# Patient Record
Sex: FEMALE | Race: NATIVE HAWAIIAN OR OTHER PACIFIC ISLANDER | HISPANIC OR LATINO | Employment: UNEMPLOYED | URBAN - METROPOLITAN AREA
[De-identification: names, ages, dates, MRNs, and addresses within clinical notes are randomized per-mention and may not be internally consistent; named-entity substitution may affect disease eponyms.]

---

## 2021-04-26 ENCOUNTER — APPOINTMENT (EMERGENCY)
Dept: CT IMAGING | Facility: HOSPITAL | Age: 46
DRG: 251 | End: 2021-04-26
Payer: COMMERCIAL

## 2021-04-26 ENCOUNTER — HOSPITAL ENCOUNTER (INPATIENT)
Facility: HOSPITAL | Age: 46
LOS: 1 days | Discharge: LEFT BEFORE DISCHARGE | DRG: 251 | End: 2021-04-27
Attending: EMERGENCY MEDICINE | Admitting: INTERNAL MEDICINE
Payer: COMMERCIAL

## 2021-04-26 DIAGNOSIS — R10.9 ABDOMINAL PAIN: Primary | ICD-10-CM

## 2021-04-26 DIAGNOSIS — R79.89 ELEVATED LFTS: ICD-10-CM

## 2021-04-26 LAB
ALBUMIN SERPL BCP-MCNC: 3.8 G/DL (ref 3.5–5)
ALP SERPL-CCNC: 236 U/L (ref 46–116)
ALT SERPL W P-5'-P-CCNC: 406 U/L (ref 12–78)
ANION GAP SERPL CALCULATED.3IONS-SCNC: 10 MMOL/L (ref 4–13)
AST SERPL W P-5'-P-CCNC: 551 U/L (ref 5–45)
BACTERIA UR QL AUTO: ABNORMAL /HPF
BASOPHILS # BLD AUTO: 0.03 THOUSANDS/ΜL (ref 0–0.1)
BASOPHILS NFR BLD AUTO: 0 % (ref 0–1)
BILIRUB DIRECT SERPL-MCNC: 0.63 MG/DL (ref 0–0.2)
BILIRUB SERPL-MCNC: 0.97 MG/DL (ref 0.2–1)
BILIRUB UR QL STRIP: ABNORMAL
BUN SERPL-MCNC: 13 MG/DL (ref 5–25)
CALCIUM SERPL-MCNC: 9.3 MG/DL (ref 8.3–10.1)
CHLORIDE SERPL-SCNC: 101 MMOL/L (ref 100–108)
CLARITY UR: CLEAR
CO2 SERPL-SCNC: 30 MMOL/L (ref 21–32)
COLOR UR: YELLOW
CREAT SERPL-MCNC: 0.99 MG/DL (ref 0.6–1.3)
EOSINOPHIL # BLD AUTO: 0.17 THOUSAND/ΜL (ref 0–0.61)
EOSINOPHIL NFR BLD AUTO: 2 % (ref 0–6)
ERYTHROCYTE [DISTWIDTH] IN BLOOD BY AUTOMATED COUNT: 13 % (ref 11.6–15.1)
EXT PREG TEST URINE: NEGATIVE
EXT. CONTROL ED NAV: NORMAL
GFR SERPL CREATININE-BSD FRML MDRD: 69 ML/MIN/1.73SQ M
GLUCOSE SERPL-MCNC: 124 MG/DL (ref 65–140)
GLUCOSE UR STRIP-MCNC: NEGATIVE MG/DL
HCT VFR BLD AUTO: 43.2 % (ref 34.8–46.1)
HGB BLD-MCNC: 14.8 G/DL (ref 11.5–15.4)
HGB UR QL STRIP.AUTO: ABNORMAL
IMM GRANULOCYTES # BLD AUTO: 0.05 THOUSAND/UL (ref 0–0.2)
IMM GRANULOCYTES NFR BLD AUTO: 1 % (ref 0–2)
KETONES UR STRIP-MCNC: NEGATIVE MG/DL
LEUKOCYTE ESTERASE UR QL STRIP: NEGATIVE
LIPASE SERPL-CCNC: 120 U/L (ref 73–393)
LYMPHOCYTES # BLD AUTO: 2.64 THOUSANDS/ΜL (ref 0.6–4.47)
LYMPHOCYTES NFR BLD AUTO: 25 % (ref 14–44)
MCH RBC QN AUTO: 33.6 PG (ref 26.8–34.3)
MCHC RBC AUTO-ENTMCNC: 34.3 G/DL (ref 31.4–37.4)
MCV RBC AUTO: 98 FL (ref 82–98)
MONOCYTES # BLD AUTO: 0.82 THOUSAND/ΜL (ref 0.17–1.22)
MONOCYTES NFR BLD AUTO: 8 % (ref 4–12)
NEUTROPHILS # BLD AUTO: 6.68 THOUSANDS/ΜL (ref 1.85–7.62)
NEUTS SEG NFR BLD AUTO: 64 % (ref 43–75)
NITRITE UR QL STRIP: NEGATIVE
NON-SQ EPI CELLS URNS QL MICRO: ABNORMAL /HPF
NRBC BLD AUTO-RTO: 0 /100 WBCS
PH UR STRIP.AUTO: 6.5 [PH] (ref 4.5–8)
PLATELET # BLD AUTO: 358 THOUSANDS/UL (ref 149–390)
PMV BLD AUTO: 9.7 FL (ref 8.9–12.7)
POTASSIUM SERPL-SCNC: 3.7 MMOL/L (ref 3.5–5.3)
PROT SERPL-MCNC: 8.1 G/DL (ref 6.4–8.2)
PROT UR STRIP-MCNC: ABNORMAL MG/DL
RBC # BLD AUTO: 4.41 MILLION/UL (ref 3.81–5.12)
RBC #/AREA URNS AUTO: ABNORMAL /HPF
SODIUM SERPL-SCNC: 141 MMOL/L (ref 136–145)
SP GR UR STRIP.AUTO: >=1.03 (ref 1–1.03)
UROBILINOGEN UR QL STRIP.AUTO: 2 E.U./DL
WBC # BLD AUTO: 10.39 THOUSAND/UL (ref 4.31–10.16)
WBC #/AREA URNS AUTO: ABNORMAL /HPF

## 2021-04-26 PROCEDURE — 96375 TX/PRO/DX INJ NEW DRUG ADDON: CPT

## 2021-04-26 PROCEDURE — 80076 HEPATIC FUNCTION PANEL: CPT | Performed by: EMERGENCY MEDICINE

## 2021-04-26 PROCEDURE — 36415 COLL VENOUS BLD VENIPUNCTURE: CPT | Performed by: EMERGENCY MEDICINE

## 2021-04-26 PROCEDURE — 80048 BASIC METABOLIC PNL TOTAL CA: CPT | Performed by: EMERGENCY MEDICINE

## 2021-04-26 PROCEDURE — 85025 COMPLETE CBC W/AUTO DIFF WBC: CPT | Performed by: EMERGENCY MEDICINE

## 2021-04-26 PROCEDURE — 99284 EMERGENCY DEPT VISIT MOD MDM: CPT

## 2021-04-26 PROCEDURE — 74177 CT ABD & PELVIS W/CONTRAST: CPT

## 2021-04-26 PROCEDURE — 96366 THER/PROPH/DIAG IV INF ADDON: CPT

## 2021-04-26 PROCEDURE — 99285 EMERGENCY DEPT VISIT HI MDM: CPT | Performed by: EMERGENCY MEDICINE

## 2021-04-26 PROCEDURE — 83690 ASSAY OF LIPASE: CPT | Performed by: EMERGENCY MEDICINE

## 2021-04-26 PROCEDURE — 81025 URINE PREGNANCY TEST: CPT | Performed by: EMERGENCY MEDICINE

## 2021-04-26 PROCEDURE — 96365 THER/PROPH/DIAG IV INF INIT: CPT

## 2021-04-26 PROCEDURE — 81001 URINALYSIS AUTO W/SCOPE: CPT

## 2021-04-26 RX ORDER — KETOROLAC TROMETHAMINE 30 MG/ML
15 INJECTION, SOLUTION INTRAMUSCULAR; INTRAVENOUS ONCE
Status: COMPLETED | OUTPATIENT
Start: 2021-04-26 | End: 2021-04-26

## 2021-04-26 RX ORDER — ONDANSETRON 2 MG/ML
4 INJECTION INTRAMUSCULAR; INTRAVENOUS ONCE
Status: COMPLETED | OUTPATIENT
Start: 2021-04-26 | End: 2021-04-26

## 2021-04-26 RX ORDER — MORPHINE SULFATE 4 MG/ML
4 INJECTION, SOLUTION INTRAMUSCULAR; INTRAVENOUS ONCE
Status: COMPLETED | OUTPATIENT
Start: 2021-04-26 | End: 2021-04-26

## 2021-04-26 RX ADMIN — KETOROLAC TROMETHAMINE 15 MG: 30 INJECTION, SOLUTION INTRAMUSCULAR; INTRAVENOUS at 20:06

## 2021-04-26 RX ADMIN — MORPHINE SULFATE 4 MG: 4 INJECTION INTRAVENOUS at 20:04

## 2021-04-26 RX ADMIN — SODIUM CHLORIDE, SODIUM LACTATE, POTASSIUM CHLORIDE, AND CALCIUM CHLORIDE 1000 ML: .6; .31; .03; .02 INJECTION, SOLUTION INTRAVENOUS at 20:10

## 2021-04-26 RX ADMIN — IOHEXOL 100 ML: 350 INJECTION, SOLUTION INTRAVENOUS at 20:59

## 2021-04-26 RX ADMIN — ONDANSETRON 4 MG: 2 INJECTION INTRAMUSCULAR; INTRAVENOUS at 20:03

## 2021-04-27 ENCOUNTER — HOSPITAL ENCOUNTER (INPATIENT)
Facility: HOSPITAL | Age: 46
LOS: 1 days | DRG: 284 | End: 2021-04-28
Attending: EMERGENCY MEDICINE | Admitting: INTERNAL MEDICINE
Payer: COMMERCIAL

## 2021-04-27 VITALS
WEIGHT: 146.16 LBS | BODY MASS INDEX: 26.9 KG/M2 | TEMPERATURE: 98.2 F | SYSTOLIC BLOOD PRESSURE: 128 MMHG | DIASTOLIC BLOOD PRESSURE: 79 MMHG | OXYGEN SATURATION: 100 % | HEIGHT: 62 IN | RESPIRATION RATE: 17 BRPM | HEART RATE: 72 BPM

## 2021-04-27 DIAGNOSIS — R10.13 EPIGASTRIC ABDOMINAL PAIN: Primary | ICD-10-CM

## 2021-04-27 DIAGNOSIS — R11.2 NAUSEA AND VOMITING, INTRACTABILITY OF VOMITING NOT SPECIFIED, UNSPECIFIED VOMITING TYPE: ICD-10-CM

## 2021-04-27 DIAGNOSIS — E80.6 HYPERBILIRUBINEMIA: ICD-10-CM

## 2021-04-27 DIAGNOSIS — R79.89 ELEVATED LFTS: ICD-10-CM

## 2021-04-27 DIAGNOSIS — R10.9 ABDOMINAL PAIN, UNSPECIFIED ABDOMINAL LOCATION: ICD-10-CM

## 2021-04-27 LAB
ALBUMIN SERPL BCP-MCNC: 2.9 G/DL (ref 3.5–5)
ALBUMIN SERPL BCP-MCNC: 3.7 G/DL (ref 3.5–5)
ALP SERPL-CCNC: 197 U/L (ref 46–116)
ALP SERPL-CCNC: 277 U/L (ref 46–116)
ALT SERPL W P-5'-P-CCNC: 385 U/L (ref 12–78)
ALT SERPL W P-5'-P-CCNC: 705 U/L (ref 12–78)
ANION GAP SERPL CALCULATED.3IONS-SCNC: 11 MMOL/L (ref 4–13)
ANION GAP SERPL CALCULATED.3IONS-SCNC: 9 MMOL/L (ref 4–13)
AST SERPL W P-5'-P-CCNC: 1068 U/L (ref 5–45)
AST SERPL W P-5'-P-CCNC: 266 U/L (ref 5–45)
BASOPHILS # BLD AUTO: 0.02 THOUSANDS/ΜL (ref 0–0.1)
BASOPHILS # BLD AUTO: 0.02 THOUSANDS/ΜL (ref 0–0.1)
BASOPHILS NFR BLD AUTO: 0 % (ref 0–1)
BASOPHILS NFR BLD AUTO: 0 % (ref 0–1)
BILIRUB DIRECT SERPL-MCNC: 1.4 MG/DL (ref 0–0.2)
BILIRUB SERPL-MCNC: 0.39 MG/DL (ref 0.2–1)
BILIRUB SERPL-MCNC: 1.79 MG/DL (ref 0.2–1)
BUN SERPL-MCNC: 12 MG/DL (ref 5–25)
BUN SERPL-MCNC: 13 MG/DL (ref 5–25)
CALCIUM ALBUM COR SERPL-MCNC: 9.5 MG/DL (ref 8.3–10.1)
CALCIUM SERPL-MCNC: 8.6 MG/DL (ref 8.3–10.1)
CALCIUM SERPL-MCNC: 9.1 MG/DL (ref 8.3–10.1)
CHLORIDE SERPL-SCNC: 102 MMOL/L (ref 100–108)
CHLORIDE SERPL-SCNC: 107 MMOL/L (ref 100–108)
CO2 SERPL-SCNC: 26 MMOL/L (ref 21–32)
CO2 SERPL-SCNC: 27 MMOL/L (ref 21–32)
CREAT SERPL-MCNC: 0.87 MG/DL (ref 0.6–1.3)
CREAT SERPL-MCNC: 0.96 MG/DL (ref 0.6–1.3)
EOSINOPHIL # BLD AUTO: 0.1 THOUSAND/ΜL (ref 0–0.61)
EOSINOPHIL # BLD AUTO: 0.28 THOUSAND/ΜL (ref 0–0.61)
EOSINOPHIL NFR BLD AUTO: 1 % (ref 0–6)
EOSINOPHIL NFR BLD AUTO: 4 % (ref 0–6)
ERYTHROCYTE [DISTWIDTH] IN BLOOD BY AUTOMATED COUNT: 12.9 % (ref 11.6–15.1)
ERYTHROCYTE [DISTWIDTH] IN BLOOD BY AUTOMATED COUNT: 13.1 % (ref 11.6–15.1)
EXT PREG TEST URINE: NEGATIVE
EXT. CONTROL ED NAV: NORMAL
GFR SERPL CREATININE-BSD FRML MDRD: 72 ML/MIN/1.73SQ M
GFR SERPL CREATININE-BSD FRML MDRD: 81 ML/MIN/1.73SQ M
GLUCOSE SERPL-MCNC: 111 MG/DL (ref 65–140)
GLUCOSE SERPL-MCNC: 95 MG/DL (ref 65–140)
HCT VFR BLD AUTO: 39 % (ref 34.8–46.1)
HCT VFR BLD AUTO: 41.6 % (ref 34.8–46.1)
HGB BLD-MCNC: 13 G/DL (ref 11.5–15.4)
HGB BLD-MCNC: 14.1 G/DL (ref 11.5–15.4)
IMM GRANULOCYTES # BLD AUTO: 0.02 THOUSAND/UL (ref 0–0.2)
IMM GRANULOCYTES # BLD AUTO: 0.03 THOUSAND/UL (ref 0–0.2)
IMM GRANULOCYTES NFR BLD AUTO: 0 % (ref 0–2)
IMM GRANULOCYTES NFR BLD AUTO: 0 % (ref 0–2)
LIPASE SERPL-CCNC: 83 U/L (ref 73–393)
LYMPHOCYTES # BLD AUTO: 1.83 THOUSANDS/ΜL (ref 0.6–4.47)
LYMPHOCYTES # BLD AUTO: 2.78 THOUSANDS/ΜL (ref 0.6–4.47)
LYMPHOCYTES NFR BLD AUTO: 25 % (ref 14–44)
LYMPHOCYTES NFR BLD AUTO: 38 % (ref 14–44)
MCH RBC QN AUTO: 33.2 PG (ref 26.8–34.3)
MCH RBC QN AUTO: 33.7 PG (ref 26.8–34.3)
MCHC RBC AUTO-ENTMCNC: 33.3 G/DL (ref 31.4–37.4)
MCHC RBC AUTO-ENTMCNC: 33.9 G/DL (ref 31.4–37.4)
MCV RBC AUTO: 101 FL (ref 82–98)
MCV RBC AUTO: 98 FL (ref 82–98)
MONOCYTES # BLD AUTO: 0.57 THOUSAND/ΜL (ref 0.17–1.22)
MONOCYTES # BLD AUTO: 0.62 THOUSAND/ΜL (ref 0.17–1.22)
MONOCYTES NFR BLD AUTO: 8 % (ref 4–12)
MONOCYTES NFR BLD AUTO: 8 % (ref 4–12)
NEUTROPHILS # BLD AUTO: 3.65 THOUSANDS/ΜL (ref 1.85–7.62)
NEUTROPHILS # BLD AUTO: 4.93 THOUSANDS/ΜL (ref 1.85–7.62)
NEUTS SEG NFR BLD AUTO: 50 % (ref 43–75)
NEUTS SEG NFR BLD AUTO: 66 % (ref 43–75)
NRBC BLD AUTO-RTO: 0 /100 WBCS
NRBC BLD AUTO-RTO: 0 /100 WBCS
PLATELET # BLD AUTO: 292 THOUSANDS/UL (ref 149–390)
PLATELET # BLD AUTO: 319 THOUSANDS/UL (ref 149–390)
PMV BLD AUTO: 9.8 FL (ref 8.9–12.7)
PMV BLD AUTO: 9.8 FL (ref 8.9–12.7)
POTASSIUM SERPL-SCNC: 3.5 MMOL/L (ref 3.5–5.3)
POTASSIUM SERPL-SCNC: 4.2 MMOL/L (ref 3.5–5.3)
PROT SERPL-MCNC: 6.8 G/DL (ref 6.4–8.2)
PROT SERPL-MCNC: 7.9 G/DL (ref 6.4–8.2)
RBC # BLD AUTO: 3.86 MILLION/UL (ref 3.81–5.12)
RBC # BLD AUTO: 4.25 MILLION/UL (ref 3.81–5.12)
SODIUM SERPL-SCNC: 140 MMOL/L (ref 136–145)
SODIUM SERPL-SCNC: 142 MMOL/L (ref 136–145)
WBC # BLD AUTO: 7.37 THOUSAND/UL (ref 4.31–10.16)
WBC # BLD AUTO: 7.48 THOUSAND/UL (ref 4.31–10.16)

## 2021-04-27 PROCEDURE — 81025 URINE PREGNANCY TEST: CPT | Performed by: PHYSICIAN ASSISTANT

## 2021-04-27 PROCEDURE — 85025 COMPLETE CBC W/AUTO DIFF WBC: CPT | Performed by: PHYSICIAN ASSISTANT

## 2021-04-27 PROCEDURE — 96361 HYDRATE IV INFUSION ADD-ON: CPT

## 2021-04-27 PROCEDURE — C9113 INJ PANTOPRAZOLE SODIUM, VIA: HCPCS | Performed by: PHYSICIAN ASSISTANT

## 2021-04-27 PROCEDURE — 99222 1ST HOSP IP/OBS MODERATE 55: CPT | Performed by: INTERNAL MEDICINE

## 2021-04-27 PROCEDURE — 83690 ASSAY OF LIPASE: CPT | Performed by: PHYSICIAN ASSISTANT

## 2021-04-27 PROCEDURE — 99285 EMERGENCY DEPT VISIT HI MDM: CPT | Performed by: PHYSICIAN ASSISTANT

## 2021-04-27 PROCEDURE — 99254 IP/OBS CNSLTJ NEW/EST MOD 60: CPT | Performed by: INTERNAL MEDICINE

## 2021-04-27 PROCEDURE — 36415 COLL VENOUS BLD VENIPUNCTURE: CPT | Performed by: PHYSICIAN ASSISTANT

## 2021-04-27 PROCEDURE — 80053 COMPREHEN METABOLIC PANEL: CPT | Performed by: PHYSICIAN ASSISTANT

## 2021-04-27 PROCEDURE — 80076 HEPATIC FUNCTION PANEL: CPT | Performed by: PHYSICIAN ASSISTANT

## 2021-04-27 PROCEDURE — 80048 BASIC METABOLIC PNL TOTAL CA: CPT | Performed by: PHYSICIAN ASSISTANT

## 2021-04-27 PROCEDURE — 96375 TX/PRO/DX INJ NEW DRUG ADDON: CPT

## 2021-04-27 PROCEDURE — 99222 1ST HOSP IP/OBS MODERATE 55: CPT | Performed by: PHYSICIAN ASSISTANT

## 2021-04-27 PROCEDURE — 96374 THER/PROPH/DIAG INJ IV PUSH: CPT

## 2021-04-27 PROCEDURE — 99285 EMERGENCY DEPT VISIT HI MDM: CPT

## 2021-04-27 PROCEDURE — 99232 SBSQ HOSP IP/OBS MODERATE 35: CPT | Performed by: INTERNAL MEDICINE

## 2021-04-27 RX ORDER — ACETAMINOPHEN 325 MG/1
650 TABLET ORAL EVERY 6 HOURS PRN
Status: DISCONTINUED | OUTPATIENT
Start: 2021-04-27 | End: 2021-04-27 | Stop reason: HOSPADM

## 2021-04-27 RX ORDER — KETOROLAC TROMETHAMINE 30 MG/ML
15 INJECTION, SOLUTION INTRAMUSCULAR; INTRAVENOUS EVERY 6 HOURS PRN
Status: DISCONTINUED | OUTPATIENT
Start: 2021-04-27 | End: 2021-04-27 | Stop reason: HOSPADM

## 2021-04-27 RX ORDER — SODIUM CHLORIDE 9 MG/ML
125 INJECTION, SOLUTION INTRAVENOUS CONTINUOUS
Status: DISCONTINUED | OUTPATIENT
Start: 2021-04-27 | End: 2021-04-27 | Stop reason: HOSPADM

## 2021-04-27 RX ORDER — MAGNESIUM HYDROXIDE/ALUMINUM HYDROXICE/SIMETHICONE 120; 1200; 1200 MG/30ML; MG/30ML; MG/30ML
30 SUSPENSION ORAL EVERY 6 HOURS PRN
Status: DISCONTINUED | OUTPATIENT
Start: 2021-04-27 | End: 2021-04-28 | Stop reason: HOSPADM

## 2021-04-27 RX ORDER — PANTOPRAZOLE SODIUM 40 MG/1
40 INJECTION, POWDER, FOR SOLUTION INTRAVENOUS EVERY 12 HOURS SCHEDULED
Status: DISCONTINUED | OUTPATIENT
Start: 2021-04-27 | End: 2021-04-28 | Stop reason: HOSPADM

## 2021-04-27 RX ORDER — MAGNESIUM HYDROXIDE/ALUMINUM HYDROXICE/SIMETHICONE 120; 1200; 1200 MG/30ML; MG/30ML; MG/30ML
30 SUSPENSION ORAL EVERY 6 HOURS PRN
Status: DISCONTINUED | OUTPATIENT
Start: 2021-04-27 | End: 2021-04-27 | Stop reason: HOSPADM

## 2021-04-27 RX ORDER — KETOROLAC TROMETHAMINE 30 MG/ML
15 INJECTION, SOLUTION INTRAMUSCULAR; INTRAVENOUS EVERY 6 HOURS PRN
Status: DISCONTINUED | OUTPATIENT
Start: 2021-04-27 | End: 2021-04-27

## 2021-04-27 RX ORDER — ONDANSETRON 2 MG/ML
4 INJECTION INTRAMUSCULAR; INTRAVENOUS EVERY 6 HOURS PRN
Status: DISCONTINUED | OUTPATIENT
Start: 2021-04-27 | End: 2021-04-27 | Stop reason: HOSPADM

## 2021-04-27 RX ORDER — PANTOPRAZOLE SODIUM 40 MG/1
40 INJECTION, POWDER, FOR SOLUTION INTRAVENOUS EVERY 12 HOURS SCHEDULED
Status: DISCONTINUED | OUTPATIENT
Start: 2021-04-27 | End: 2021-04-27 | Stop reason: HOSPADM

## 2021-04-27 RX ORDER — LORAZEPAM 2 MG/ML
0.5 INJECTION INTRAMUSCULAR ONCE AS NEEDED
Status: DISCONTINUED | OUTPATIENT
Start: 2021-04-27 | End: 2021-04-27 | Stop reason: HOSPADM

## 2021-04-27 RX ORDER — ACETAMINOPHEN 325 MG/1
650 TABLET ORAL EVERY 6 HOURS PRN
Status: DISCONTINUED | OUTPATIENT
Start: 2021-04-27 | End: 2021-04-28

## 2021-04-27 RX ORDER — ONDANSETRON 2 MG/ML
4 INJECTION INTRAMUSCULAR; INTRAVENOUS ONCE
Status: COMPLETED | OUTPATIENT
Start: 2021-04-27 | End: 2021-04-27

## 2021-04-27 RX ORDER — MORPHINE SULFATE 4 MG/ML
4 INJECTION, SOLUTION INTRAMUSCULAR; INTRAVENOUS ONCE
Status: COMPLETED | OUTPATIENT
Start: 2021-04-27 | End: 2021-04-27

## 2021-04-27 RX ORDER — ONDANSETRON 2 MG/ML
4 INJECTION INTRAMUSCULAR; INTRAVENOUS EVERY 6 HOURS PRN
Status: DISCONTINUED | OUTPATIENT
Start: 2021-04-27 | End: 2021-04-28 | Stop reason: HOSPADM

## 2021-04-27 RX ORDER — SODIUM CHLORIDE 9 MG/ML
75 INJECTION, SOLUTION INTRAVENOUS CONTINUOUS
Status: DISPENSED | OUTPATIENT
Start: 2021-04-27 | End: 2021-04-28

## 2021-04-27 RX ADMIN — KETOROLAC TROMETHAMINE 15 MG: 30 INJECTION, SOLUTION INTRAMUSCULAR; INTRAVENOUS at 01:35

## 2021-04-27 RX ADMIN — MORPHINE SULFATE 4 MG: 4 INJECTION INTRAVENOUS at 20:04

## 2021-04-27 RX ADMIN — PANTOPRAZOLE SODIUM 40 MG: 40 INJECTION, POWDER, FOR SOLUTION INTRAVENOUS at 21:49

## 2021-04-27 RX ADMIN — SODIUM CHLORIDE 1000 ML: 0.9 INJECTION, SOLUTION INTRAVENOUS at 20:08

## 2021-04-27 RX ADMIN — SODIUM CHLORIDE 125 ML/HR: 0.9 INJECTION, SOLUTION INTRAVENOUS at 02:51

## 2021-04-27 RX ADMIN — ONDANSETRON 4 MG: 2 INJECTION INTRAMUSCULAR; INTRAVENOUS at 20:05

## 2021-04-27 NOTE — ED NOTES
Patient just fell asleep and was unable to sleep last night; Sergey Koenig RN will hold medications until patient wakes up;       Karina Hernandez RN  04/27/21 6988

## 2021-04-27 NOTE — UTILIZATION REVIEW
Inpatient Admission Authorization Request   NOTIFICATION OF INPATIENT ADMISSION/INPATIENT AUTHORIZATION REQUEST   SERVICING FACILITY:   [address] [place] [address], [facility], [facility]  Tax ID: [id number]  NPI: [id number]  Place of Service: Inpatient [address] [facility]  [address]  Place of Service Code: 24     ATTENDING PROVIDER:  Attending Name and NPI#: [facility] [[id number]]  Address: [address] [place] [address], [facility], [facility]  Phone: [phone]     UTILIZATION REVIEW CONTACT:  Gerard Walker, Utilization   Network Utilization Review Department  Phone: [phone]  Fax: [phone]  Email: [email or web address]     PHYSICIAN ADVISORY SERVICES:  FOR HLEV-WT-BDZY REVIEW - MEDICAL NECESSITY DENIAL  Phone: [phone]  Fax: [phone]  Email: [email or web address]     TYPE OF REQUEST:  Inpatient Status     ADMISSION INFORMATION:  ADMISSION DATE/TIME: 4/26/21 2318  PATIENT DIAGNOSIS CODE/DESCRIPTION:  Abdominal pain in female [R10 9]  DISCHARGE DATE/TIME: 4/27/2021 11:56 AM  DISCHARGE DISPOSITION (IF DISCHARGED): Home/Self Care     IMPORTANT INFORMATION:  Please contact the Gerard Walker directly with any questions or concerns regarding this request  Department voicemails are confidential     Send requests for admission clinical reviews, concurrent reviews, approvals, and administrative denials due to lack of clinical to fax [phone]

## 2021-04-27 NOTE — DISCHARGE SUMMARY
Discharge summary:  Patient left against medical advice      Admission Date: 4/26/2021       Left against medical advice Date:  04/27/2021        Primary Diagnoses  Principal Problem:    Abdominal pain  Active Problems:    Elevated LFTs    Nausea & vomiting  Resolved Problems:    * No resolved hospital problems  [facility] Course by problem:  * Abdominal pain  Assessment & Plan  Presented with 2 days of epigastric abdominal pain radiating to the back with associated nausea, vomiting, poor p o  Intake  Patient notes the pain is worse after eating  Notes she has not tolerated any significant p o  Intake in the past 2 days  Patient was also noted to have concurrent transaminitis  Her pain is located primarily in the epigastric region however not the right upper quadrant  Will start proton pump inhibitor  Patient will be evaluated by the GI team   MRCP planned to evaluate her transaminitis  Will confer with GI she also requires EGD if her epigastric postprandial pain is not felt to be secondary to her transaminitis  Patient was seen by the GI team, and scheduled for MRCP, however patient left against medical advice prior to completing her workup     Nausea & vomiting  Assessment & Plan  Patient presents nausea, vomiting, and epigastric pain  Possibly secondary to peptic ulcer disease  Start Protonix 40 mg IV b i d  Analgesics  Liquid diet was ordered, patient left against medical advice     Elevated LFTs  Assessment & Plan        Results from last 7 days   Lab Units 04/27/21  0501 04/26/21 2010   AST U/L 266* 551*   ALT U/L 385* 406*   ALK PHOS U/L 197* 236*   TOTAL BILIRUBIN mg/dL 0 39 0 97      -Patient presented with a transaminitis with an AST of 551, that improved to 166  -ALT was 406 and improved to 385   She had associated elevated alkaline phosphatase but normal total bilirubin  -Patient underwent previous cholecystectomy  -In 2015 status post cholecystectomy she was noted to have 3 stones in the common bile duct on MRCP that required ERCP   -Appreciate GI evaluation recommendations  -MRCP pending, however patient left against medical advice        Service:  [facility], [facility]  Consulting Providers   GI: [facility], Kirti Steele [facility] tests Performed   4/26 CT abdomen/pelvis:  No acute abnormality  Rounded structure in the region of the common bile duct, which appears to have been present dating back to 4912, but of uncertain etiology, 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  Patrice Champ is some mass effect on the common bile duct and it is unclear   whether this is causing impedance of biliary flow   Recommend MRI/MRCP for complete evaluation  Results from last 7 days   Lab Units 04/27/21  0501 04/26/21 2010   WBC Thousand/uL 7 37 10 39*   HEMOGLOBIN g/dL 13 0 14 8   HEMATOCRIT % 39 0 43 2   PLATELETS Thousands/uL 292 358     Results from last 7 days   Lab Units 04/27/21  0501 04/26/21 2010   POTASSIUM mmol/L 4 2 3 7   CHLORIDE mmol/L 107 101   CO2 mmol/L 26 30   BUN mg/dL 13 13   CREATININE mg/dL 0 87 0 99   CALCIUM mg/dL 8 6 9 3       History and Physical Exam:  Please refer to the Admission H&P note    Discharge Condition:  Left against medical advice  Discharge Disposition:  Left against medical advice    Discharge Note and Physical Exam:   Please note from earlier today  Patient currently sitting she needs to leave against medical advice and signed herself out and she needs to be home with her daughters  She has just had an emotional interview with her significant other  Patient is currently awake, alert, and competent to make her own decisions    She denies any domestic abuse of without anyone is forcing her to leave the hospital     Discharge Medications   Please see Medical Reconciliation Discharge Form    Discharge Follow Up Appointments:   Patient instructed to return to the ER if she wishes to continue treatment    This note has been constructed using a voice recognition system

## 2021-04-27 NOTE — ED NOTES
Patient left AMA; patient does not want to wait for AMA paperwork or to speak with [facility] providers; Lauren Bright RN educated patient the importance to follow up care and that patient needs to come back to the ED if symptoms get worst; patient verbalized understanding;        Chong Madrid RN  04/27/21 6061

## 2021-04-27 NOTE — ASSESSMENT & PLAN NOTE
Results from last 7 days   Lab Units 04/26/21 2010   AST U/L 551*   ALT U/L 406*   ALK PHOS U/L 236*   TOTAL BILIRUBIN mg/dL 0 97         Alk phos 236  Likely from compression of CBD as evidenced by CT abdomen  MRCP tomorrow  Follow LFTs

## 2021-04-27 NOTE — ED NOTES
Patient ambulatory to the bathroom with steady gait at this time       Maria Sagastume RN  04/27/21 2133

## 2021-04-27 NOTE — ASSESSMENT & PLAN NOTE
Presents with 2 days of epigastric abdominal pain radiating to the back with associated nausea, vomiting, poor p o  Intake  Patient notes the pain is worse after eating  Notes she has not tolerated any significant p o  Intake in the past 2 days  Patient was also noted to have concurrent transaminitis    Her pain is located primarily in the epigastric region however not the right upper quadrant  Will start proton pump inhibitor  Patient will be evaluated by the GI team   MRCP planned to evaluate her transaminitis  Will confer with GI she also requires EGD if her epigastric postprandial pain is not felt to be secondary to her transaminitis

## 2021-04-27 NOTE — ASSESSMENT & PLAN NOTE
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

## 2021-04-27 NOTE — ASSESSMENT & PLAN NOTE
Patient presents nausea, vomiting, and epigastric pain  Possibly secondary to peptic ulcer disease  Start Protonix 40 mg IV b i d    Analgesics  Liquid diet

## 2021-04-27 NOTE — CONSULTS
Consultation - [facility]  Raul Brar 40 y o  female MRN: [id number]  Unit/Bed#: ED 21 Encounter: [id number]        Inpatient consult to gastroenterology  Consult performed by: Dion Becker PA-C  [facility]    Inpatient consult to gastroenterology  Consult performed by: Dion Becker PA-C  Consult ordered by: Hershel Osler, MD          Reason for Consult / Principal Problem:  Abdominal pain    HPI:  71-year-old female with history of cholecystectomy presenting for evaluation of epigastric pain  She reports onset of stabbing squeezing epigastric pain 2 days ago, but the pain became unbearable and constant yesterday  She recalls having similar pain before her cholecystectomy 5-6 years ago  She then had similar pain 2-3 months later and required what sounds like ERCP for biliary stones at [facility]  She could only keep water down yesterday  She was vomiting solid foods  She denies any blood in the vomit  She denies fevers and chills  She denies any diarrhea, constipation, rectal bleeding  She was alternating aspirin and Tylenol at home for the abdominal pain  REVIEW OF SYSTEMS:    CONSTITUTIONAL: Denies any fever, chills, or rigors  Good appetite, and no recent weight loss  HEENT: No earache or tinnitus  Denies hearing loss or visual disturbances  CARDIOVASCULAR: No chest pain or palpitations  RESPIRATORY: Denies any cough, hemoptysis, shortness of breath or dyspnea on exertion  GASTROINTESTINAL: As noted in the History of Present Illness  GENITOURINARY: No problems with urination  Denies any hematuria or dysuria  NEUROLOGIC: No dizziness or vertigo, denies headaches  MUSCULOSKELETAL: Denies any muscle or joint pain  SKIN: Denies skin rashes or itching  ENDOCRINE: Denies excessive thirst  Denies intolerance to heat or cold  PSYCHOSOCIAL: Denies depression or anxiety  Denies any recent memory loss         Historical Information   Past Medical History:   Diagnosis Date    Asthma     Gallstone      History reviewed  No pertinent surgical history  Social History   Social History     Substance and Sexual Activity   Alcohol Use Not Currently     Social History     Substance and Sexual Activity   Drug Use Not Currently     Social History     Tobacco Use   Smoking Status Current Every Day Smoker    Packs/day: 1 00   Smokeless Tobacco Current User     History reviewed  No pertinent family history  Meds/Allergies     (Not in a hospital admission)    Current Facility-Administered Medications   Medication Dose Route Frequency    acetaminophen (TYLENOL) tablet 650 mg  650 mg Oral Q6H PRN    aluminum-magnesium hydroxide-simethicone (MYLANTA) oral suspension 30 mL  30 mL Oral Q6H PRN    enoxaparin (LOVENOX) subcutaneous injection 40 mg  40 mg Subcutaneous Q24H HARPER    ketorolac (TORADOL) injection 15 mg  15 mg Intravenous Q6H PRN    LORazepam (ATIVAN) injection 0 5 mg  0 5 mg Intravenous Once PRN    morphine injection 2 mg  2 mg Intravenous Q4H PRN    ondansetron (ZOFRAN) injection 4 mg  4 mg Intravenous Q6H PRN    pantoprazole (PROTONIX) injection 40 mg  40 mg Intravenous Q12H [address]    sodium chloride 0 9 % infusion  125 mL/hr Intravenous Continuous       No Known Allergies        Objective     Blood pressure 128/79, pulse 72, temperature 98 2 °F (36 8 °C), temperature source Oral, resp  rate 17, height 5' 2" (1 575 m), weight 66 3 kg (146 lb 2 6 oz), SpO2 100 %        Intake/Output Summary (Last 24 hours) at 4/27/2021 1203  Last data filed at 4/27/2021 1157  Gross per 24 hour   Intake 2137 5 ml   Output --   Net 2137 5 ml         PHYSICAL EXAM:      General Appearance:   Alert, appears in moderate discomfort, cooperative, no distress, appears stated age    HEENT:   Normocephalic, atraumatic, anicteric      Neck:  Supple, symmetrical, trachea midline, no adenopathy   Lungs:   Clear to auscultation bilaterally   Heart[de-identified]   S1 and S2 normal; regular rate and rhythm   Abdomen:   Nondistended  Normal bowel sounds  Soft  Mild epigastric tenderness to palpation  No rebound or guarding  Genitalia:   Deferred    Rectal:   Deferred    Extremities:  No cyanosis, clubbing or edema    Pulses:  2+ and symmetric all extremities    Skin:  No jaundice    Lymph nodes:  No palpable cervical lymphadenopathy        Lab Results:   Results from last 7 days   Lab Units 04/27/21  0501   WBC Thousand/uL 7 37   HEMOGLOBIN g/dL 13 0   HEMATOCRIT % 39 0   PLATELETS Thousands/uL 292   NEUTROS PCT % 50   LYMPHS PCT % 38   MONOS PCT % 8   EOS PCT % 4     Results from last 7 days   Lab Units 04/27/21  0501   POTASSIUM mmol/L 4 2   CHLORIDE mmol/L 107   CO2 mmol/L 26   BUN mg/dL 13   CREATININE mg/dL 0 87   CALCIUM mg/dL 8 6   ALK PHOS U/L 197*   ALT U/L 385*   AST U/L 266*         Results from last 7 days   Lab Units 04/26/21 2010   LIPASE u/L 120       Imaging Studies: I have personally reviewed pertinent imaging studies  Ct Abdomen Pelvis With Contrast    Result Date: 4/26/2021  Impression: No acute abnormality  Rounded structure in the region of the common bile duct, which appears to have been present dating back to 4875, but of uncertain etiology, 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  There is some mass effect on the common bile duct and it is unclear whether this is causing impedance of biliary flow  Recommend MRI/MRCP for complete evaluation  The study was marked in [facility] for immediate notification  Workstation performed: [id number]       ASSESSMENT and PLAN:      43-year-old female with history of cholecystectomy presenting for evaluation of epigastric pain and elevated liver enzymes  1) Acute epigastric pain, nausea, and vomiting  2) Elevated liver enzymes  3) Abnormal CT of abdomen    She presented with acute epigastric pain, similar to her gallbladder pain (she had cholecystectomy and what sounds like ERCP for CBD stones in the past)   She was found to have elevated liver enzymes , , alk-phos 236, total bili 0 97  These numbers are improving today , , alk-phos 197, total bili 0 39  She had mild leukocytosis 10 39 which normalized today  Lipase normal  She is afebrile and hemodynamically stable  CT abdomen pelvis with IV contrast shows rounded structure in the region of the CBD, appears to be present on previous imaging from 2009, 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 versus CBD stone causing mass effect  She is mildly tender on physical exam  She does not appear to have cholangitis given normal bilirubin and temperature     -she may have clear liquid diet  -order MRCP to further evaluate her bile duct  -if there is evidence of choledocholithiasis, she will need ERCP for stone removal  -monitor liver enzymes  -monitor white blood cell count temperature    Patient was seen and examined by Dr Alvis Closs  All romero medical decisions were made by Dr Alvis Closs  Thank you for allowing us to participate in the care of this present patient  We will follow-up with you closely

## 2021-04-27 NOTE — ED NOTES
Patient came to the nurses station that states that she wants to leave AMA and wants to talk to Eve Simmons RN sent message to Dr Jason Cai, BAM  04/27/21 9020

## 2021-04-27 NOTE — ED NOTES
Batool Mahmood RN assumed care of patient at this time, patient states that she is extremely uncomfortable and that her IV hurt; Batool Mahmood RN flushed IV and tapes patient's IV for comfort; patient continued states "I fucking hate it here, and I just want to go home"' Batool Mahmood RN made [facility] aware      Roslyn Gardiner RN  04/27/21 7491

## 2021-04-27 NOTE — ASSESSMENT & PLAN NOTE
Results from last 7 days   Lab Units 04/27/21  0501 04/26/21 2010   AST U/L 266* 551*   ALT U/L 385* 406*   ALK PHOS U/L 197* 236*   TOTAL BILIRUBIN mg/dL 0 39 0 97     -Patient presented with a transaminitis with an AST of 551, that improved to 166  -ALT was 406 and improved to 385   She had associated elevated alkaline phosphatase but normal total bilirubin  -Patient underwent previous cholecystectomy  -In 2015 status post cholecystectomy she was noted to have 3 stones in the common bile duct on MRCP that required ERCP   -Appreciate GI evaluation recommendations  -MRCP pending

## 2021-04-27 NOTE — H&P
Darius Hercules [facility] 2  1975, 40 y o  female MRN: [id number]  Unit/Bed#: ED 21 Encounter: [id number]  Primary Care Provider: Jenn Cannon DO   Date and time admitted to hospital: 4/26/2021  7:38 PM    * Abdominal pain  Assessment & Plan  Presents with 2 days of epigastric abdominal pain radiating to the back with associated nausea, vomiting, poor p o  Intake  States this feels similar to when she had gallstones  S/p cholecystectomy  Required MRCP x1 post cholecystectomy due to retained gallstone  VS stable  WBC 10 5  Electrolytes stable  CT abdomen- Rounded structure in the region of the common bile duct, which appears to have been present dating back to 6978, but of uncertain etiology, 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  There is some mass effect on the common bile duct and it is unclear whether this is causing impedance of biliary flow  Recommend MRI/MRCP for complete evaluation    Plan:   - Pain control w/ tylenol and Toradol  - IVF hydration for poor PO intake  -  NPO at midnight   - GI consult  MRCP tomorrow    Elevated LFTs  Assessment & Plan  Results from last 7 days   Lab Units 04/26/21 2010   AST U/L 551*   ALT U/L 406*   ALK PHOS U/L 236*   TOTAL BILIRUBIN mg/dL 0 97         Alk phos 236  Likely from compression of CBD as evidenced by CT abdomen  MRCP tomorrow  Follow LFTs      VTE Prophylaxis: Ambulate patient  / sequential compression device   Code Status:  Level 1 full code  POLST: There is no POLST form on file for this patient (pre-hospital)  Discussion with family:  Patient    Anticipated Length of Stay:  Patient will be admitted on an Inpatient basis with an anticipated length of stay of  greater than 2 midnights  Justification for Hospital Stay:  Abdominal pain with nausea and vomiting requiring further workup    Total Time for Visit, including Counseling / Coordination of Care: 30 minutes    Greater than 50% of this total time spent on direct patient counseling and coordination of care  Chief Complaint:   Abdominal pain    History of Present Illness:    Shubham Long is a 40 y o  female s/p cholecystectomy who presents with abdominal pain x2 days  Patient developed acute onset intermittent abdominal pain which radiates to her back, and is associated with nausea, vomiting, poor p o  Intake  She states this feels similar to previous episode of cholecystitis  She is s/p cholecystectomy, and required MRCP x1 post cholecystectomy for retained gallstone  She has tried Tylenol on aspirin without relief  She denies fevers, chills, chest pain, shortness of breath, hematemesis, melena, hematochezia, changes in bowel and urinary habits  In the ED, lab work was significant for elevated LFTs  WBC 10 5  Rest of lab work is unremarkable  UA was significant for urobilinogen  CT abdomen revealed mass effect compressing the CBD, likely from a duodenal diverticulum  Review of Systems:    Review of Systems   Constitutional: Positive for appetite change  Negative for chills, fatigue and fever  HENT: Negative for ear pain, sore throat and trouble swallowing  Eyes: Negative for visual disturbance  Respiratory: Negative for cough, chest tightness, shortness of breath and wheezing  Cardiovascular: Negative for chest pain, palpitations and leg swelling  Gastrointestinal: Positive for abdominal pain, nausea and vomiting  Negative for abdominal distention and diarrhea  Endocrine: Negative  Genitourinary: Negative for dysuria  Musculoskeletal: Negative for gait problem and myalgias  Skin: Negative for pallor  Allergic/Immunologic: Negative for immunocompromised state  Neurological: Negative for dizziness, syncope, light-headedness, numbness and headaches  Past Medical and Surgical History:     Past Medical History:   Diagnosis Date    Asthma     Gallstone        History reviewed   No pertinent surgical history  Meds/Allergies:    Prior to Admission medications    Not on File     I have reviewed home medications with patient personally  Allergies: No Known Allergies    Social History:     Marital Status: Single   Occupation:  Noncontributory  Patient Pre-hospital Living Situation:  Home  Patient Pre-hospital Level of Mobility:  Independent  Patient Pre-hospital Diet Restrictions:  None  Substance Use History:   Social History     Substance and Sexual Activity   Alcohol Use Not Currently     Social History     Tobacco Use   Smoking Status Current Every Day Smoker    Packs/day: 1 00   Smokeless Tobacco Current User     Social History     Substance and Sexual Activity   Drug Use Not Currently       Family History:    non-contributory    Physical Exam:     Vitals:   Blood Pressure: 114/65 (04/27/21 0007)  Pulse: 66 (04/27/21 0007)  Temperature: 98 3 °F (36 8 °C) (04/27/21 0007)  Temp Source: Oral (04/26/21 1928)  Respirations: 18 (04/27/21 0007)  Height: 5' 2" (157 5 cm) (04/27/21 0007)  Weight - Scale: 66 3 kg (146 lb 2 6 oz) (04/27/21 0007)  SpO2: 100 % (04/27/21 0007)    Physical Exam  Vitals signs and nursing note reviewed  Constitutional:       Appearance: Normal appearance  HENT:      Head: Normocephalic and atraumatic  Mouth/Throat:      Mouth: Mucous membranes are moist       Pharynx: Oropharynx is clear  No oropharyngeal exudate  Eyes:      Extraocular Movements: Extraocular movements intact  Cardiovascular:      Rate and Rhythm: Normal rate and regular rhythm  Pulses: Normal pulses  Heart sounds: Normal heart sounds  No murmur  No friction rub  No gallop  Pulmonary:      Effort: Pulmonary effort is normal  No respiratory distress  Breath sounds: Normal breath sounds  No stridor  No wheezing or rales  Abdominal:      General: Abdomen is flat  Bowel sounds are normal  There is no distension  Palpations: Abdomen is soft  Tenderness:  There is abdominal tenderness (epigastrium)  Musculoskeletal:      Right lower leg: No edema  Left lower leg: No edema  Skin:     General: Skin is warm and dry  Neurological:      General: No focal deficit present  Mental Status: She is alert and oriented to person, place, and time  Additional Data:     Lab Results: I have personally reviewed pertinent reports  Results from last 7 days   Lab Units 04/26/21 2010   WBC Thousand/uL 10 39*   HEMOGLOBIN g/dL 14 8   HEMATOCRIT % 43 2   PLATELETS Thousands/uL 358   NEUTROS PCT % 64   LYMPHS PCT % 25   MONOS PCT % 8   EOS PCT % 2     Results from last 7 days   Lab Units 04/26/21 2010   SODIUM mmol/L 141   POTASSIUM mmol/L 3 7   CHLORIDE mmol/L 101   CO2 mmol/L 30   BUN mg/dL 13   CREATININE mg/dL 0 99   ANION GAP mmol/L 10   CALCIUM mg/dL 9 3   ALBUMIN g/dL 3 8   TOTAL BILIRUBIN mg/dL 0 97   ALK PHOS U/L 236*   ALT U/L 406*   AST U/L 551*   GLUCOSE RANDOM mg/dL 124                       Imaging: I have personally reviewed pertinent reports  CT abdomen pelvis with contrast   Final Result by Twin Acevedo DO (04/26 2201)      No acute abnormality  Rounded structure in the region of the common bile duct, which appears to have been present dating back to 5225, but of uncertain etiology, 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 for immediate notification  Workstation performed: [id number]             EKG, Pathology, and Other Studies Reviewed on Admission:   CT abdomen    Allscripts / Epic Records Reviewed: Yes     ** Please Note: This note has been constructed using a voice recognition system   **

## 2021-04-27 NOTE — ED NOTES
Patient rang the call bell stating that she would like ice chips; James Harris RN made SLIM aware and okay with administration of ice chips at this time       Roldan Mcdonough RN  04/27/21 5363

## 2021-04-27 NOTE — QUICK NOTE
Addendum    Was called to the bedside as patient was requested to leave against medical advice  Patient currently tearful  Relates that her significant other, with whom she has had a relationship for 23 years just visited  She notes he is causing her a tremendous amount of stress and heartbreak  She relates that she feels safe at home, and he is not frequently around  She denies any domestic abuse or violence  She denies that anyone is forcing her to leave the hospital   She notes that she needs to leave the hospital to be home with her daughters  She notes she does not have any other family or friends to watch them  She relates that she feels worse she would return    Patient was counseled regarding the risk of leaving against medical advice including worsening condition, permanent disability, infection, or death  She was instructed to return to the ER if she wishes additional treatment    She notes she only lives a block away, and her daughter will bring her back

## 2021-04-27 NOTE — ED PROVIDER NOTES
History  Chief Complaint   Patient presents with    Abdominal Pain     patient c/o upper abdominal pain that has been going on for 3 days  started with vomiting yesterday and increased pain today  hx of gallstones and states it feels similar  This is a 46-year-old female with a history of gallstones who presents with epigastric abdominal pain  Over the past 3 days, the patient has been experiencing an epigastric abdominal pain described as squeezing, radiating to her back, associated with multiple episodes of nonbloody, nonbilious vomiting  States that she has experienced similar symptoms in the past associated with gallstones  States that she has gallstones or moved, but is unsure if her gallbladder is still intact  Pain is improved with vomiting  Pain onset seems to be random  No history of abdominal surgeries  Last bowel movement was earlier today described as normal   Denies fever/chills, lightheadedness/dizziness, numbness/weakness, headache, change in vision, URI symptoms, neck pain, chest pain, palpitations, shortness of breath, cough, back pain, flank pain, diarrhea, hematochezia, melena, dysuria, hematuria, abnormal vaginal discharge/bleeding  None       Past Medical History:   Diagnosis Date    Asthma     Gallstone        History reviewed  No pertinent surgical history  History reviewed  No pertinent family history  I have reviewed and agree with the history as documented  E-Cigarette/Vaping     E-Cigarette/Vaping Substances     Social History     Tobacco Use    Smoking status: Current Every Day Smoker     Packs/day: 1 00    Smokeless tobacco: Current User   Substance Use Topics    Alcohol use: Not Currently    Drug use: Not Currently       Review of Systems   Constitutional: Negative for chills and fever  HENT: Negative for rhinorrhea, sore throat and trouble swallowing  Eyes: Negative for photophobia and visual disturbance     Respiratory: Negative for cough, chest tightness and shortness of breath  Cardiovascular: Negative for chest pain, palpitations and leg swelling  Gastrointestinal: Positive for abdominal pain, nausea and vomiting  Negative for blood in stool and diarrhea  Endocrine: Negative for polyuria  Genitourinary: Negative for dysuria, flank pain, hematuria, vaginal bleeding and vaginal discharge  Musculoskeletal: Negative for back pain and neck pain  Skin: Negative for color change and rash  Allergic/Immunologic: Negative for immunocompromised state  Neurological: Negative for dizziness, weakness, light-headedness, numbness and headaches  All other systems reviewed and are negative  Physical Exam  Physical Exam  Constitutional:       General: She is not in acute distress  Appearance: Normal appearance  She is well-developed  HENT:      Mouth/Throat:      Pharynx: Uvula midline  Eyes:      Conjunctiva/sclera: Conjunctivae normal       Pupils: Pupils are equal, round, and reactive to light  Neck:      Thyroid: No thyroid mass or thyromegaly  Trachea: Trachea normal    Cardiovascular:      Rate and Rhythm: Normal rate and regular rhythm  Pulses: Normal pulses  Heart sounds: Normal heart sounds  No murmur  Pulmonary:      Effort: Pulmonary effort is normal       Breath sounds: Normal breath sounds  Abdominal:      General: Bowel sounds are normal       Palpations: Abdomen is soft  Tenderness: There is abdominal tenderness in the right upper quadrant, epigastric area and left upper quadrant  There is no guarding or rebound  Skin:     General: Skin is warm and dry  Neurological:      Mental Status: She is alert  Psychiatric:         Speech: Speech normal          Behavior: Behavior normal  Behavior is cooperative  Thought Content:  Thought content normal          Vital Signs  ED Triage Vitals [04/26/21 1928]   Temperature Pulse Respirations Blood Pressure SpO2   98 3 °F (36 8 °C) 88 18 158/84 99 % Temp Source Heart Rate Source Patient Position - Orthostatic VS BP Location FiO2 (%)   Oral Monitor Sitting Right arm --      Pain Score       Worst Possible Pain           Vitals:    04/26/21 1928 04/26/21 2039 04/26/21 2144   BP: 158/84 138/79 124/63   Pulse: 88 71 70   Patient Position - Orthostatic VS: Sitting Lying Lying         Visual Acuity      ED Medications  Medications   morphine (PF) 4 mg/mL injection 4 mg (4 mg Intravenous Given 4/26/21 2004)   ketorolac (TORADOL) injection 15 mg (15 mg Intravenous Given 4/26/21 2006)   ondansetron (ZOFRAN) injection 4 mg (4 mg Intravenous Given 4/26/21 2003)   lactated ringers bolus 1,000 mL (1,000 mL Intravenous New Bag 4/26/21 2010)   iohexol (OMNIPAQUE) 350 MG/ML injection (SINGLE-DOSE) 100 mL (100 mL Intravenous Given 4/26/21 2059)       Diagnostic Studies  Results Reviewed     Procedure Component Value Units Date/Time    Lipase [[id number]]  (Normal) Collected: 04/26/21 2010    Lab Status: Final result Specimen: Blood from Line, Venous Updated: 04/26/21 2045     Lipase 120 u/L     Basic metabolic panel [[id number]] Collected: 04/26/21 2010    Lab Status: Final result Specimen: Blood from Line, Venous Updated: 04/26/21 2045     Sodium 141 mmol/L      Potassium 3 7 mmol/L      Chloride 101 mmol/L      CO2 30 mmol/L      ANION GAP 10 mmol/L      BUN 13 mg/dL      Creatinine 0 99 mg/dL      Glucose 124 mg/dL      Calcium 9 3 mg/dL      eGFR 69 ml/min/1 73sq m     Narrative:      Anthony guidelines for Chronic Kidney Disease (CKD):     Stage 1 with normal or high GFR (GFR > 90 mL/min/1 73 square meters)    Stage 2 Mild CKD (GFR = 60-89 mL/min/1 73 square meters)    Stage 3A Moderate CKD (GFR = 45-59 mL/min/1 73 square meters)    Stage 3B Moderate CKD (GFR = 30-44 mL/min/1 73 square meters)    Stage 4 Severe CKD (GFR = 15-29 mL/min/1 73 square meters)    Stage 5 End Stage CKD (GFR <15 mL/min/1 73 square meters)  Note: GFR calculation is accurate only with a steady state creatinine    Hepatic function panel [[id number]]  (Abnormal) Collected: 04/26/21 2010    Lab Status: Final result Specimen: Blood from Line, Venous Updated: 04/26/21 2045     Total Bilirubin 0 97 mg/dL      Bilirubin, Direct 0 63 mg/dL      Alkaline Phosphatase 236 U/L       U/L       U/L      Total Protein 8 1 g/dL      Albumin 3 8 g/dL     Urine Microscopic [[id number]]  (Abnormal) Collected: 04/26/21 1953    Lab Status: Final result Specimen: Urine, Clean Catch Updated: 04/26/21 2044     RBC, UA 4-10 /hpf      WBC, UA 0-1 /hpf      Epithelial Cells Occasional /hpf      Bacteria, UA Occasional /hpf     CBC and differential [[id number]]  (Abnormal) Collected: 04/26/21 2010    Lab Status: Final result Specimen: Blood from Line, Venous Updated: 04/26/21 2023     WBC 10 39 Thousand/uL      RBC 4 41 Million/uL      Hemoglobin 14 8 g/dL      Hematocrit 43 2 %      MCV 98 fL      MCH 33 6 pg      MCHC 34 3 g/dL      RDW 13 0 %      MPV 9 7 fL      Platelets 409 Thousands/uL      nRBC 0 /100 WBCs      Neutrophils Relative 64 %      Immat GRANS % 1 %      Lymphocytes Relative 25 %      Monocytes Relative 8 %      Eosinophils Relative 2 %      Basophils Relative 0 %      Neutrophils Absolute 6 68 Thousands/µL      Immature Grans Absolute 0 05 Thousand/uL      Lymphocytes Absolute 2 64 Thousands/µL      Monocytes Absolute 0 82 Thousand/µL      Eosinophils Absolute 0 17 Thousand/µL      Basophils Absolute 0 03 Thousands/µL     POCT pregnancy, urine [[id number]]  (Normal) Resulted: 04/26/21 1956    Lab Status: Final result Updated: 04/26/21 1956     EXT PREG TEST UR (Ref: Negative) negative     Control valid    Urine Macroscopic, POC [[id number]]  (Abnormal) Collected: 04/26/21 1953    Lab Status: Final result Specimen: Urine Updated: 04/26/21 1955     Color, UA Yellow     Clarity, UA Clear     pH, UA 6 5     Leukocytes, UA Negative     Nitrite, UA Negative     Protein, UA 30 (1+) mg/dl      Glucose, UA Negative mg/dl      Ketones, UA Negative mg/dl      Urobilinogen, UA 2 0 E U /dl      Bilirubin, UA Interference- unable to analyze     Blood, UA Trace     Specific Gravity, UA >=1 030    Narrative:      CLINITEK RESULT                 CT abdomen pelvis with contrast   Final Result by Zakiya Tavares DO (04/26 2201)      No acute abnormality  Rounded structure in the region of the common bile duct, which appears to have been present dating back to 1969, but of uncertain etiology, 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  There is some mass effect on the common bile duct and it is unclear    whether this is causing impedance of biliary flow  Recommend MRI/MRCP for complete evaluation  The study was marked in [facility] for immediate notification  Workstation performed: [id number]                    Procedures  Procedures         ED Course  ED Course as of Apr 26 2318 Mon Apr 26, 2021 2211 TT sent to GI fellow  2214 GI agrees with admission for MRCP  GI consult placed  SBIRT 22yo+      Most Recent Value   SBIRT (22 yo +)   In order to provide better care to our patients, we are screening all of our patients for alcohol and drug use  Would it be okay to ask you these screening questions? No Filed at: 04/26/2021 2026                    MDM  Number of Diagnoses or Management Options  Diagnosis management comments: Plan to check a labs, urinalysis with urine pregnancy  CT abdomen/pelvis with contrast   Toradol, Zofran, morphine, fluids for symptoms  Disposition pending results        Disposition  Final diagnoses:   Abdominal pain   Elevated LFTs     Time reflects when diagnosis was documented in both MDM as applicable and the Disposition within this note     Time User Action Codes Description Comment    4/26/2021 10:48 PM Prabhjot CAROLINA Add [R10 9] Abdominal pain     4/26/2021 10:48 PM Prabhjot Becker P Add [R79 89] Elevated LFTs       ED Disposition     ED Disposition Condition Date/Time Comment    Admit Stable Mon Apr 26, 2021 10:48 PM         Follow-up Information    None         Patient's Medications    No medications on file     No discharge procedures on file      PDMP Review     None          ED Provider  Electronically Signed by           Jake Polanco MD  04/26/21 8309

## 2021-04-27 NOTE — PROGRESS NOTES
[address] [facility]  Progress Note - Carly Mccarthy 1975, 40 y o  female MRN: [id number]  Unit/Bed#: ED 21 Encounter: [id number]  Primary Care Provider: Zluema James DO   Date and time admitted to hospital: 4/26/2021  7:38 PM    * Abdominal pain  Assessment & Plan  Presents with 2 days of epigastric abdominal pain radiating to the back with associated nausea, vomiting, poor p o  Intake  Patient notes the pain is worse after eating  Notes she has not tolerated any significant p o  Intake in the past 2 days  Patient was also noted to have concurrent transaminitis  Her pain is located primarily in the epigastric region however not the right upper quadrant  Will start proton pump inhibitor  Patient will be evaluated by the GI team   MRCP planned to evaluate her transaminitis  Will confer with GI she also requires EGD if her epigastric postprandial pain is not felt to be secondary to her transaminitis    Nausea & vomiting  Assessment & Plan  Patient presents nausea, vomiting, and epigastric pain  Possibly secondary to peptic ulcer disease  Start Protonix 40 mg IV b i d  Analgesics  Liquid diet    Elevated LFTs  Assessment & Plan  Results from last 7 days   Lab Units 04/27/21  0501 04/26/21 2010   AST U/L 266* 551*   ALT U/L 385* 406*   ALK PHOS U/L 197* 236*   TOTAL BILIRUBIN mg/dL 0 39 0 97     -Patient presented with a transaminitis with an AST of 551, that improved to 166  -ALT was 406 and improved to 385   She had associated elevated alkaline phosphatase but normal total bilirubin  -Patient underwent previous cholecystectomy  -In 2015 status post cholecystectomy she was noted to have 3 stones in the common bile duct on MRCP that required ERCP   -Appreciate GI evaluation recommendations  -MRCP pending          Discussed with patient's ER nurse  Discussed with GI team:MARY Kathleen PA-C  ERCP pending    VTE Pharmacologic Prophylaxis: Enoxaparin (Lovenox)  VTE Mechanical Prophylaxis: sequential compression device        Certification Statement: The patient will continue to require additional inpatient hospital stay due to need for further acute intervention for transaminitis, abdominal pain    Status: inpatient     ===================================================================    Subjective:  Patient she continues to have pain in the epigastric region however her current dose of pain medication is controlling her pain  She notes the pain is worse after eating  She relates the past 2 days she would vomit after eating  She also notes she gets intermittent nausea throughout the day  She relates 2 days of poor p o  Intake  She has been feeling dehydrated notes her urine has been more concentrated  Patient notes her pain is centered in the epigastric region  She denies any right upper quadrant pain  She notes the pain radiates straight through to her back  She denies any other pain anywhere  She denies any chest pain  She denies any shortness of breath, pleuritic chest pain or cough  She denies any dizziness or lightheadedness  She denies any bleeding  She denies any melena or hematochezia  Physical Exam:   Temp:  [98 2 °F (36 8 °C)-98 3 °F (36 8 °C)] 98 2 °F (36 8 °C)  HR:  [66-88] 72  Resp:  [17-18] 17  BP: (114-158)/(62-84) 128/79  Gen:  Pleasant, non-tachypnic, non-dyspnic  Conversant  Able to speak in complete sentences without having to stop for dyspnea  Heart: regular rate and rhythm, S1S2 present, no murmur, rub or gallop  Lungs: clear to ausculatation bilaterally  No wheezing, crackles, or rhonchi  No accessory muscle use or respiratory distress  Abd: soft, + tender with palpation in the epigastric region  + very mild tenderness in the right upper quadrant  No other tenderness elicited  , non-distended  NABS, no guarding, rebound or peritoneal signs  Extremities: no clubbing, cyanosis or edema  2+pedal pulses bilaterally   Full range of motion  Neuro: awake, alert and oriented  And goal directed speech  Strength globally intact    Skin: warm and dry: no petechiae, purpura and rash  LABS:   Results from last 7 days   Lab Units 04/27/21  0501 04/26/21 2010   WBC Thousand/uL 7 37 10 39*   HEMOGLOBIN g/dL 13 0 14 8   HEMATOCRIT % 39 0 43 2   PLATELETS Thousands/uL 292 358     Results from last 7 days   Lab Units 04/27/21  0501 04/26/21 2010   POTASSIUM mmol/L 4 2 3 7   CHLORIDE mmol/L 107 101   CO2 mmol/L 26 30   BUN mg/dL 13 13   CREATININE mg/dL 0 87 0 99   CALCIUM mg/dL 8 6 9 3       Hospital Data:    4/26 CT abdomen/pelvis:  No acute abnormality  Rounded structure in the region of the common bile duct, which appears to have been present dating back to 8447, but of uncertain etiology, 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

## 2021-04-27 NOTE — UTILIZATION REVIEW
Initial Clinical Review    Admission: Date/Time/Statement:   Admission Orders (From admission, onward)     Ordered        04/26/21 2318  Inpatient Admission  Once                   Orders Placed This Encounter   Procedures    Inpatient Admission     Standing Status:   Standing     Number of Occurrences:   1     Order Specific Question:   Level of Care     Answer:   Med Surg [16]     Order Specific Question:   Estimated length of stay     Answer:   More than 2 Midnights     Order Specific Question:   Certification     Answer:   I certify that inpatient services are medically necessary for this patient for a duration of greater than two midnights  See H&P and MD Progress Notes for additional information about the patient's course of treatment  ED Arrival Information     Expected Arrival Acuity Means of Arrival Escorted By Service Admission Type    - 4/26/2021 18:58 Emergent Walk-In Self Hospitalist Emergency    Arrival Complaint    Abd Pain         Chief Complaint   Patient presents with    Abdominal Pain     patient c/o upper abdominal pain that has been going on for 3 days  started with vomiting yesterday and increased pain today  hx of gallstones and states it feels similar  Initial Presentation: 40 y o  female s/p cholecystectomy  presents to ED with epigastric abdominal pain described as squeezing, radiating to her back, associated with nausea & multiple episodes of nonbloody, nonbilious vomiting  States this feels similar to previous episode of cholecystitis  She is required MRCP x1 post cholecystectomy for retained gallstone  In the ED, lab work was significant for elevated LFTs  WBC 10 39  UA was significant for urobilinogen  CT abdomen revealed mass effect compressing the CBD, likely from a duodenal diverticulum  + tenderness RUQ, LUQ, epigastric area       Date: 4/26  Admitted to Inpatient  MS with abdominal pain and elevated LFT"s and Plan is for IV hydration,  pain control, NPO @ midnight, GI consult, MRCP tomorrow, follow lft's     Day 2:  4/27  continues to have pain in the epigastric region however her current dose of pain medication is controlling her pain  She notes the pain is worse after eating  Per GI:presented with acute epigastric pain, similar to her gallbladder pain (she had cholecystectomy and what sounds like ERCP for CBD stones in the past)  She was found to have elevated liver enzymes & CT abdomen pelvis with IV contrast shows rounded structure in the region of the CBD, appears to be present on previous imaging from 2009, 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 versus CBD stone causing mass effect  She is mildly tender on physical exam  She does not appear to have cholangitis given normal bilirubin and temperature   Plan for MRCP -if there is evidence of choledocholithiasis, she will need ERCP for stone removal, monitor liver enzymes, monitor white blood cell count temperature    Pt left [facility] 4/27      ED Triage Vitals [04/26/21 1928]   Temperature Pulse Respirations Blood Pressure SpO2   98 3 °F (36 8 °C) 88 18 158/84 99 %      Temp Source Heart Rate Source Patient Position - Orthostatic VS BP Location FiO2 (%)   Oral Monitor Sitting Right arm --      Pain Score       Worst Possible Pain          Wt Readings from Last 1 Encounters:   04/27/21 66 3 kg (146 lb 2 6 oz)     Additional Vital Signs:   04/27/21 0722  98 2 °F (36 8 °C)  72  17  128/79  100 %  None (Room air) Sitting   04/27/21 0137  --  73  17  120/62  100 %  None (Room air) Lying   04/27/21 0007  98 3 °F (36 8 °C)  66  18  114/65  100 %  None (Room air) Lying   04/26/21 2144  --  70  18  124/63  99 %  None (Room air) Lying   04/26/21 2039  --  71  18  138/79  98 %  None (Room air) Lying       Pertinent Labs/Diagnostic Test Results:   Results from last 7 days   Lab Units 04/27/21  0501 04/26/21 2010   WBC Thousand/uL 7 37 10 39*   HEMOGLOBIN g/dL 13 0 14 8   HEMATOCRIT % 39 0 43 2   PLATELETS Thousands/uL 292 358 NEUTROS ABS Thousands/µL 3 65 6 68     Results from last 7 days   Lab Units 04/27/21  0501 04/26/21 2010   SODIUM mmol/L 142 141   POTASSIUM mmol/L 4 2 3 7   CHLORIDE mmol/L 107 101   CO2 mmol/L 26 30   ANION GAP mmol/L 9 10   BUN mg/dL 13 13   CREATININE mg/dL 0 87 0 99   EGFR ml/min/1 73sq m 81 69   CALCIUM mg/dL 8 6 9 3     Results from last 7 days   Lab Units 04/27/21  0501 04/26/21 2010   AST U/L 266* 551*   ALT U/L 385* 406*   ALK PHOS U/L 197* 236*   TOTAL PROTEIN g/dL 6 8 8 1   ALBUMIN g/dL 2 9* 3 8   TOTAL BILIRUBIN mg/dL 0 39 0 97   BILIRUBIN DIRECT mg/dL  --  0 63*         Results from last 7 days   Lab Units 04/27/21  0501 04/26/21 2010   GLUCOSE RANDOM mg/dL 95 124     Results from last 7 days   Lab Units 04/26/21 2010   LIPASE u/L 120     Results from last 7 days   Lab Units 04/26/21 1953   CLARITY UA  Clear   COLOR UA  Yellow   SPEC GRAV UA  >=1 030   PH UA  6 5   GLUCOSE UA mg/dl Negative   KETONES UA mg/dl Negative   BLOOD UA  Trace*   PROTEIN UA mg/dl 30 (1+)*   NITRITE UA  Negative   BILIRUBIN UA  Interference- unable to analyze*   UROBILINOGEN UA E U /dl 2 0*   LEUKOCYTES UA  Negative   WBC UA /hpf 0-1*   RBC UA /hpf 4-10*   BACTERIA UA /hpf Occasional   EPITHELIAL CELLS WET PREP /hpf Occasional     4/26 CT A&P: No acute abnormality  Rounded structure in the region of the common bile duct, which appears to have been present dating back to 2510, but of uncertain etiology, 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  Nicole Flax is some mass effect on the common bile duct and it is unclear   whether this is causing impedance of biliary flow   Recommend MRI/MRCP for complete evaluation       ED Treatment:   Medication Administration from 04/26/2021 1858 to 04/27/2021 1234       Date/Time Order Dose Route Action     04/26/2021 2004 morphine (PF) 4 mg/mL injection 4 mg 4 mg Intravenous Given     04/26/2021 2006 ketorolac (TORADOL) injection 15 mg 15 mg Intravenous Given     04/26/2021 2003 ondansetron (ZOFRAN) injection 4 mg 4 mg Intravenous Given     04/26/2021 2010 lactated ringers bolus 1,000 mL 1,000 mL Intravenous New Bag     04/27/2021 0251 sodium chloride 0 9 % infusion 125 mL/hr Intravenous New Bag     04/27/2021 0135 ketorolac (TORADOL) injection 15 mg 15 mg Intravenous Given        Past Medical History:   Diagnosis Date    Asthma     Gallstone      Admitting Diagnosis: Abdominal pain in female [R10 9]  Age/Sex: 40 y o  female     Admission Orders:  Scheduled Medications:  enoxaparin, 40 mg, Subcutaneous, Q24H HARPER  pantoprazole, 40 mg, Intravenous, Q12H HARPER    Continuous IV Infusions:  sodium chloride, 125 mL/hr, Intravenous, Continuous    PRN Meds:  acetaminophen, 650 mg, Oral, Q6H PRN  aluminum-magnesium hydroxide-simethicone, 30 mL, Oral, Q6H PRN  ketorolac, 15 mg, Intravenous, Q6H PRN  LORazepam, 0 5 mg, Intravenous, Once PRN  morphine injection, 2 mg, Intravenous, Q4H PRN  ondansetron, 4 mg, Intravenous, Q6H PRN    IP CONSULT TO GASTROENTEROLOGY  SCDs    Network Utilization Review Department  ATTENTION: Please call with any questions or concerns to [phone] and carefully listen to the prompts so that you are directed to the right person  All voicemails are confidential   Harlene Pair all requests for admission clinical reviews, approved or denied determinations and any other requests to dedicated fax number below belonging to the campus where the patient is receiving treatment   List of dedicated fax numbers for the Facilities:  [address] DENIALS (Administrative/Medical Necessity) [phone]   [address] (Maternity/NICU/Pediatrics) 261 [facility],[address] [place] [phone] [facility] [address] [phone]

## 2021-04-28 ENCOUNTER — APPOINTMENT (INPATIENT)
Dept: MRI IMAGING | Facility: HOSPITAL | Age: 46
DRG: 284 | End: 2021-04-28
Payer: COMMERCIAL

## 2021-04-28 ENCOUNTER — HOSPITAL ENCOUNTER (INPATIENT)
Facility: HOSPITAL | Age: 46
LOS: 2 days | Discharge: HOME | DRG: 284 | End: 2021-04-30
Attending: INTERNAL MEDICINE | Admitting: INTERNAL MEDICINE
Payer: COMMERCIAL

## 2021-04-28 VITALS
HEART RATE: 63 BPM | TEMPERATURE: 98.2 F | WEIGHT: 141.31 LBS | BODY MASS INDEX: 26.01 KG/M2 | SYSTOLIC BLOOD PRESSURE: 134 MMHG | HEIGHT: 62 IN | DIASTOLIC BLOOD PRESSURE: 88 MMHG | RESPIRATION RATE: 18 BRPM | OXYGEN SATURATION: 98 %

## 2021-04-28 DIAGNOSIS — K80.50 CHOLEDOCHOLITHIASIS: Primary | ICD-10-CM

## 2021-04-28 DIAGNOSIS — R10.9 ABDOMINAL PAIN, UNSPECIFIED ABDOMINAL LOCATION: ICD-10-CM

## 2021-04-28 DIAGNOSIS — R79.89 ELEVATED LFTS: ICD-10-CM

## 2021-04-28 PROBLEM — J45.909 ASTHMA: Status: ACTIVE | Noted: 2021-04-28

## 2021-04-28 PROBLEM — K80.20 CHOLELITHIASIS: Status: ACTIVE | Noted: 2021-04-28

## 2021-04-28 PROBLEM — Z72.0 TOBACCO USE: Status: ACTIVE | Noted: 2021-04-28

## 2021-04-28 LAB
ALBUMIN SERPL BCP-MCNC: 2.9 G/DL (ref 3.5–5)
ALP SERPL-CCNC: 216 U/L (ref 46–116)
ALT SERPL W P-5'-P-CCNC: 565 U/L (ref 12–78)
ANION GAP SERPL CALCULATED.3IONS-SCNC: 7 MMOL/L (ref 4–13)
APAP SERPL-MCNC: <2 UG/ML (ref 10–20)
AST SERPL W P-5'-P-CCNC: 400 U/L (ref 5–45)
BASOPHILS # BLD AUTO: 0.03 THOUSANDS/ΜL (ref 0–0.1)
BASOPHILS NFR BLD AUTO: 0 % (ref 0–1)
BILIRUB SERPL-MCNC: 0.35 MG/DL (ref 0.2–1)
BUN SERPL-MCNC: 10 MG/DL (ref 5–25)
CALCIUM ALBUM COR SERPL-MCNC: 9 MG/DL (ref 8.3–10.1)
CALCIUM SERPL-MCNC: 8.1 MG/DL (ref 8.3–10.1)
CHLORIDE SERPL-SCNC: 110 MMOL/L (ref 100–108)
CO2 SERPL-SCNC: 26 MMOL/L (ref 21–32)
CREAT SERPL-MCNC: 0.78 MG/DL (ref 0.6–1.3)
EOSINOPHIL # BLD AUTO: 0.3 THOUSAND/ΜL (ref 0–0.61)
EOSINOPHIL NFR BLD AUTO: 4 % (ref 0–6)
ERYTHROCYTE [DISTWIDTH] IN BLOOD BY AUTOMATED COUNT: 13 % (ref 11.6–15.1)
FERRITIN SERPL-MCNC: 94 NG/ML (ref 8–388)
GFR SERPL CREATININE-BSD FRML MDRD: 92 ML/MIN/1.73SQ M
GLUCOSE SERPL-MCNC: 88 MG/DL (ref 65–140)
HAV IGM SER QL: NORMAL
HBV CORE IGM SER QL: NORMAL
HBV SURFACE AG SER QL: NORMAL
HCT VFR BLD AUTO: 36.8 % (ref 34.8–46.1)
HCV AB SER QL: NORMAL
HGB BLD-MCNC: 12.2 G/DL (ref 11.5–15.4)
IMM GRANULOCYTES # BLD AUTO: 0.02 THOUSAND/UL (ref 0–0.2)
IMM GRANULOCYTES NFR BLD AUTO: 0 % (ref 0–2)
IRON SATN MFR SERPL: 44 %
IRON SERPL-MCNC: 141 UG/DL (ref 50–170)
LYMPHOCYTES # BLD AUTO: 2.7 THOUSANDS/ΜL (ref 0.6–4.47)
LYMPHOCYTES NFR BLD AUTO: 38 % (ref 14–44)
MCH RBC QN AUTO: 33.5 PG (ref 26.8–34.3)
MCHC RBC AUTO-ENTMCNC: 33.2 G/DL (ref 31.4–37.4)
MCV RBC AUTO: 101 FL (ref 82–98)
MONOCYTES # BLD AUTO: 0.59 THOUSAND/ΜL (ref 0.17–1.22)
MONOCYTES NFR BLD AUTO: 8 % (ref 4–12)
NEUTROPHILS # BLD AUTO: 3.4 THOUSANDS/ΜL (ref 1.85–7.62)
NEUTS SEG NFR BLD AUTO: 50 % (ref 43–75)
NRBC BLD AUTO-RTO: 0 /100 WBCS
PLATELET # BLD AUTO: 269 THOUSANDS/UL (ref 149–390)
PMV BLD AUTO: 9.7 FL (ref 8.9–12.7)
POTASSIUM SERPL-SCNC: 3.9 MMOL/L (ref 3.5–5.3)
PROT SERPL-MCNC: 6.4 G/DL (ref 6.4–8.2)
RBC # BLD AUTO: 3.64 MILLION/UL (ref 3.81–5.12)
SODIUM SERPL-SCNC: 143 MMOL/L (ref 136–145)
TIBC SERPL-MCNC: 321 UG/DL (ref 250–450)
WBC # BLD AUTO: 7.04 THOUSAND/UL (ref 4.31–10.16)

## 2021-04-28 PROCEDURE — 80074 ACUTE HEPATITIS PANEL: CPT | Performed by: INTERNAL MEDICINE

## 2021-04-28 PROCEDURE — 86645 CMV ANTIBODY IGM: CPT | Performed by: PHYSICIAN ASSISTANT

## 2021-04-28 PROCEDURE — 85025 COMPLETE CBC W/AUTO DIFF WBC: CPT | Performed by: PHYSICIAN ASSISTANT

## 2021-04-28 PROCEDURE — 83540 ASSAY OF IRON: CPT | Performed by: PHYSICIAN ASSISTANT

## 2021-04-28 PROCEDURE — 83550 IRON BINDING TEST: CPT | Performed by: PHYSICIAN ASSISTANT

## 2021-04-28 PROCEDURE — 74181 MRI ABDOMEN W/O CONTRAST: CPT

## 2021-04-28 PROCEDURE — 80143 DRUG ASSAY ACETAMINOPHEN: CPT | Performed by: INTERNAL MEDICINE

## 2021-04-28 PROCEDURE — 86665 EPSTEIN-BARR CAPSID VCA: CPT | Performed by: PHYSICIAN ASSISTANT

## 2021-04-28 PROCEDURE — 82728 ASSAY OF FERRITIN: CPT | Performed by: PHYSICIAN ASSISTANT

## 2021-04-28 PROCEDURE — 86235 NUCLEAR ANTIGEN ANTIBODY: CPT | Performed by: PHYSICIAN ASSISTANT

## 2021-04-28 PROCEDURE — 87529 HSV DNA AMP PROBE: CPT | Performed by: PHYSICIAN ASSISTANT

## 2021-04-28 PROCEDURE — 86256 FLUORESCENT ANTIBODY TITER: CPT | Performed by: PHYSICIAN ASSISTANT

## 2021-04-28 PROCEDURE — 36415 COLL VENOUS BLD VENIPUNCTURE: CPT | Performed by: PHYSICIAN ASSISTANT

## 2021-04-28 PROCEDURE — 86663 EPSTEIN-BARR ANTIBODY: CPT | Performed by: PHYSICIAN ASSISTANT

## 2021-04-28 PROCEDURE — 86038 ANTINUCLEAR ANTIBODIES: CPT | Performed by: PHYSICIAN ASSISTANT

## 2021-04-28 PROCEDURE — G1004 CDSM NDSC: HCPCS

## 2021-04-28 PROCEDURE — 86644 CMV ANTIBODY: CPT | Performed by: PHYSICIAN ASSISTANT

## 2021-04-28 PROCEDURE — C9113 INJ PANTOPRAZOLE SODIUM, VIA: HCPCS | Performed by: PHYSICIAN ASSISTANT

## 2021-04-28 PROCEDURE — 80053 COMPREHEN METABOLIC PANEL: CPT | Performed by: PHYSICIAN ASSISTANT

## 2021-04-28 PROCEDURE — 99222 1ST HOSP IP/OBS MODERATE 55: CPT | Performed by: INTERNAL MEDICINE

## 2021-04-28 PROCEDURE — 99223 1ST HOSP IP/OBS HIGH 75: CPT | Performed by: INTERNAL MEDICINE

## 2021-04-28 PROCEDURE — C9113 INJ PANTOPRAZOLE SODIUM, VIA: HCPCS | Performed by: INTERNAL MEDICINE

## 2021-04-28 PROCEDURE — 86664 EPSTEIN-BARR NUCLEAR ANTIGEN: CPT | Performed by: PHYSICIAN ASSISTANT

## 2021-04-28 RX ORDER — NICOTINE 21 MG/24HR
14 PATCH, TRANSDERMAL 24 HOURS TRANSDERMAL DAILY
Status: DISCONTINUED | OUTPATIENT
Start: 2021-04-29 | End: 2021-04-30 | Stop reason: HOSPADM

## 2021-04-28 RX ORDER — CEFAZOLIN SODIUM 2 G/50ML
2000 SOLUTION INTRAVENOUS EVERY 8 HOURS
Status: DISCONTINUED | OUTPATIENT
Start: 2021-04-28 | End: 2021-04-28 | Stop reason: HOSPADM

## 2021-04-28 RX ORDER — SODIUM CHLORIDE 9 MG/ML
75 INJECTION, SOLUTION INTRAVENOUS CONTINUOUS
Status: CANCELLED | OUTPATIENT
Start: 2021-04-28 | End: 2021-04-29

## 2021-04-28 RX ORDER — ONDANSETRON 2 MG/ML
4 INJECTION INTRAMUSCULAR; INTRAVENOUS EVERY 6 HOURS PRN
Status: DISCONTINUED | OUTPATIENT
Start: 2021-04-28 | End: 2021-04-30 | Stop reason: HOSPADM

## 2021-04-28 RX ORDER — PANTOPRAZOLE SODIUM 40 MG/1
40 INJECTION, POWDER, FOR SOLUTION INTRAVENOUS EVERY 12 HOURS SCHEDULED
Status: CANCELLED | OUTPATIENT
Start: 2021-04-28

## 2021-04-28 RX ORDER — SODIUM CHLORIDE 9 MG/ML
75 INJECTION, SOLUTION INTRAVENOUS CONTINUOUS
Status: DISCONTINUED | OUTPATIENT
Start: 2021-04-28 | End: 2021-04-29

## 2021-04-28 RX ORDER — ONDANSETRON 2 MG/ML
4 INJECTION INTRAMUSCULAR; INTRAVENOUS EVERY 6 HOURS PRN
Status: CANCELLED | OUTPATIENT
Start: 2021-04-28

## 2021-04-28 RX ORDER — MAGNESIUM HYDROXIDE/ALUMINUM HYDROXICE/SIMETHICONE 120; 1200; 1200 MG/30ML; MG/30ML; MG/30ML
30 SUSPENSION ORAL EVERY 6 HOURS PRN
Status: DISCONTINUED | OUTPATIENT
Start: 2021-04-28 | End: 2021-04-30 | Stop reason: HOSPADM

## 2021-04-28 RX ORDER — PANTOPRAZOLE SODIUM 40 MG/1
40 INJECTION, POWDER, FOR SOLUTION INTRAVENOUS EVERY 12 HOURS SCHEDULED
Status: DISCONTINUED | OUTPATIENT
Start: 2021-04-28 | End: 2021-04-30 | Stop reason: HOSPADM

## 2021-04-28 RX ORDER — CEFAZOLIN SODIUM 2 G/50ML
2000 SOLUTION INTRAVENOUS EVERY 8 HOURS
Status: CANCELLED | OUTPATIENT
Start: 2021-04-28

## 2021-04-28 RX ORDER — OXYCODONE HYDROCHLORIDE 5 MG/1
5 TABLET ORAL EVERY 4 HOURS PRN
Status: DISCONTINUED | OUTPATIENT
Start: 2021-04-28 | End: 2021-04-30 | Stop reason: HOSPADM

## 2021-04-28 RX ORDER — OXYCODONE HYDROCHLORIDE 5 MG/1
5 TABLET ORAL EVERY 4 HOURS PRN
Status: CANCELLED | OUTPATIENT
Start: 2021-04-28

## 2021-04-28 RX ORDER — OXYCODONE HYDROCHLORIDE 5 MG/1
5 TABLET ORAL EVERY 4 HOURS PRN
Status: DISCONTINUED | OUTPATIENT
Start: 2021-04-28 | End: 2021-04-28 | Stop reason: HOSPADM

## 2021-04-28 RX ORDER — MAGNESIUM HYDROXIDE/ALUMINUM HYDROXICE/SIMETHICONE 120; 1200; 1200 MG/30ML; MG/30ML; MG/30ML
30 SUSPENSION ORAL EVERY 6 HOURS PRN
Status: CANCELLED | OUTPATIENT
Start: 2021-04-28

## 2021-04-28 RX ORDER — CEFAZOLIN SODIUM 2 G/50ML
2000 SOLUTION INTRAVENOUS EVERY 8 HOURS
Status: DISCONTINUED | OUTPATIENT
Start: 2021-04-28 | End: 2021-04-30 | Stop reason: HOSPADM

## 2021-04-28 RX ORDER — LORAZEPAM 2 MG/ML
1 INJECTION INTRAMUSCULAR ONCE
Status: DISCONTINUED | OUTPATIENT
Start: 2021-04-28 | End: 2021-04-28 | Stop reason: HOSPADM

## 2021-04-28 RX ADMIN — SODIUM CHLORIDE 75 ML/HR: 0.9 INJECTION, SOLUTION INTRAVENOUS at 22:03

## 2021-04-28 RX ADMIN — ONDANSETRON 4 MG: 2 INJECTION INTRAMUSCULAR; INTRAVENOUS at 23:53

## 2021-04-28 RX ADMIN — SODIUM CHLORIDE 75 ML/HR: 0.9 INJECTION, SOLUTION INTRAVENOUS at 00:10

## 2021-04-28 RX ADMIN — METRONIDAZOLE 500 MG: 500 INJECTION, SOLUTION INTRAVENOUS at 14:00

## 2021-04-28 RX ADMIN — CEFAZOLIN SODIUM 2000 MG: 2 SOLUTION INTRAVENOUS at 22:04

## 2021-04-28 RX ADMIN — PANTOPRAZOLE SODIUM 40 MG: 40 INJECTION, POWDER, FOR SOLUTION INTRAVENOUS at 22:08

## 2021-04-28 RX ADMIN — MORPHINE SULFATE 2 MG: 2 INJECTION, SOLUTION INTRAMUSCULAR; INTRAVENOUS at 06:34

## 2021-04-28 RX ADMIN — PANTOPRAZOLE SODIUM 40 MG: 40 INJECTION, POWDER, FOR SOLUTION INTRAVENOUS at 10:20

## 2021-04-28 RX ADMIN — MORPHINE SULFATE 2 MG: 2 INJECTION, SOLUTION INTRAMUSCULAR; INTRAVENOUS at 16:08

## 2021-04-28 RX ADMIN — MORPHINE SULFATE 2 MG: 2 INJECTION, SOLUTION INTRAMUSCULAR; INTRAVENOUS at 23:46

## 2021-04-28 RX ADMIN — OXYCODONE HYDROCHLORIDE 5 MG: 5 TABLET ORAL at 22:09

## 2021-04-28 RX ADMIN — CEFAZOLIN SODIUM 2000 MG: 2 SOLUTION INTRAVENOUS at 13:19

## 2021-04-28 RX ADMIN — MORPHINE SULFATE 2 MG: 2 INJECTION, SOLUTION INTRAMUSCULAR; INTRAVENOUS at 00:08

## 2021-04-28 NOTE — ASSESSMENT & PLAN NOTE
Patient presents nausea, vomiting, and epigastric pain  Possibly secondary to peptic ulcer disease vs CBD compression  Start Protonix 40 mg IV b i d  Analgesics  GI consult  Appreciate recs

## 2021-04-28 NOTE — ED NOTES
Pt asking for something to eat  Marco Hurd  Advised pt is okay to eat just NPO after midnight        Garo Gregory  04/27/21 4973

## 2021-04-28 NOTE — CASE MANAGEMENT
Pt to be transferred to B with CMN completed, sent to [facility], to be scanned into EHR with copy in [address] [place] [address]

## 2021-04-28 NOTE — ASSESSMENT & PLAN NOTE
Patient had left AMA to take care of family matters  Returns [facility] with same complaint  Presents with 3 days of epigastric abdominal pain radiating to the back with associated nausea, vomiting, poor p o  Intake  Patient notes the pain is worse after eating  Notes she has not tolerated any significant p o  Intake in the past 3 days  Patient was also noted to have concurrent transaminitis  Her pain is located primarily in the epigastric region however not the right upper quadrant  Will start proton pump inhibitor  GI consult  Was scheduled for MRCP today but patient left AMA

## 2021-04-28 NOTE — ASSESSMENT & PLAN NOTE
Results from last 7 days   Lab Units 04/27/21 2007 04/27/21  0501 04/26/21 2010   AST U/L 1,068* 266* 551*   ALT U/L 705* 385* 406*   ALK PHOS U/L 277* 197* 236*     Patient presented with a transaminitis with an AST of 551, ALT of 406, and Alk phos of 236  - Initially improved with fluids and NPO    Patient left AMA due to family matters and returned tonight with worsening transaminitis  Previously underwent cholecystectomy  -In 2015 status post cholecystectomy she was noted to have 3 stones in the common bile duct on MRCP that required ERCP   Appreciate GI evaluation recommendations  MRCP pending

## 2021-04-28 NOTE — ED PROVIDER NOTES
History  Chief Complaint   Patient presents with    Abdominal Pain     pt was seen here this morning for the same thing and was awaiting admission  had to leave for personal reasons and is now back for same abdominal pain  46-year-old female with a hx cholecystectomy who presents with epigastric abdominal pain  Over the past 4 days, she has been experiencing epigastric abdominal pain described as sharp, radiating to her back, associated with multiple episodes of nonbloody, nonbilious vomiting  States that she has similar symptoms in the past associated with gallstones  Pain onset without temporal relation to eating  Last bowel movement was earlier today described as normal, no blood  Denies fever/chills, lightheadedness/dizziness, numbness/weakness, headache, URI symptoms, neck pain, chest pain, palpitations, shortness of breath, cough, back pain, flank pain, diarrhea, hematochezia, melena, dysuria, hematuria, abnormal vaginal discharge/bleeding  Patient was seen in ED 4/26/2021, found to have elevated LFTs and a CT scan concerning for duodenal diverticulum exerting mass effect on the common bile duct  Patient was admitted to hospitalist service with plans for ERCP with GI  Patient left AMA around noon today, citing childcare issues  She reports she is amenable to staying now  Patient reports pain is unchanged, but recurred following departure from ED today        History provided by:  Medical records and patient  Abdominal Pain  Pain location:  Epigastric  Pain quality: sharp    Pain radiates to:  Back  Pain severity:  Severe  Onset quality:  Gradual  Chronicity:  Recurrent  Relieved by:  None tried  Worsened by:  Nothing  Ineffective treatments:  None tried  Associated symptoms: nausea and vomiting    Associated symptoms: no anorexia, no chest pain, no chills, no constipation, no cough, no dysuria, no fever, no shortness of breath, no vaginal bleeding and no vaginal discharge    Risk factors: has not had multiple surgeries, no NSAID use and not pregnant        None       Past Medical History:   Diagnosis Date    Asthma     Gallstone        History reviewed  No pertinent surgical history  History reviewed  No pertinent family history  I have reviewed and agree with the history as documented  E-Cigarette/Vaping     E-Cigarette/Vaping Substances     Social History     Tobacco Use    Smoking status: Current Every Day Smoker     Packs/day: 1 00    Smokeless tobacco: Current User   Substance Use Topics    Alcohol use: Not Currently    Drug use: Not Currently       Review of Systems   Constitutional: Negative for chills and fever  HENT: Negative for congestion  Respiratory: Negative for cough and shortness of breath  Cardiovascular: Negative for chest pain  Gastrointestinal: Positive for abdominal pain, nausea and vomiting  Negative for anorexia, blood in stool and constipation  Genitourinary: Negative for dysuria, vaginal bleeding and vaginal discharge  Skin: Negative for rash and wound  Neurological: Negative for dizziness and weakness  All other systems reviewed and are negative  Physical Exam  Physical Exam  Vitals signs and nursing note reviewed  Constitutional:       General: She is not in acute distress  Appearance: She is well-developed  She is not ill-appearing or toxic-appearing  Comments: Appears uncomfortable  HENT:      Head: Normocephalic and atraumatic  Right Ear: Hearing and external ear normal       Left Ear: Hearing and external ear normal       Nose: Nose normal    Eyes:      General: No scleral icterus  Conjunctiva/sclera: Conjunctivae normal    Neck:      Musculoskeletal: Full passive range of motion without pain and neck supple  Cardiovascular:      Rate and Rhythm: Normal rate and regular rhythm  Heart sounds: Normal heart sounds, S1 normal and S2 normal  No murmur     Pulmonary:      Effort: Pulmonary effort is normal  Breath sounds: Normal breath sounds  No decreased breath sounds, wheezing, rhonchi or rales  Abdominal:      General: Bowel sounds are normal       Tenderness: There is abdominal tenderness in the epigastric area  There is no right CVA tenderness, left CVA tenderness or guarding  Musculoskeletal:      Comments: Normal range of motion; no edema, tenderness, or deformities  Skin:     General: Skin is warm and dry  Findings: No rash  Neurological:      Mental Status: She is alert and oriented to person, place, and time  GCS: GCS eye subscore is 4  GCS verbal subscore is 5  GCS motor subscore is 6     Psychiatric:         Mood and Affect: Mood normal          Speech: Speech normal          Vital Signs  ED Triage Vitals   Temperature Pulse Respirations Blood Pressure SpO2   04/27/21 1913 04/27/21 1913 04/27/21 1913 04/27/21 1913 04/27/21 1913   98 °F (36 7 °C) 83 16 (!) 173/92 100 %      Temp Source Heart Rate Source Patient Position - Orthostatic VS BP Location FiO2 (%)   04/27/21 1913 04/27/21 2032 04/27/21 2032 04/27/21 2032 --   Oral Monitor Lying Right arm       Pain Score       04/27/21 1913       Worst Possible Pain           Vitals:    04/27/21 2032 04/27/21 2130 04/27/21 2230 04/27/21 2335   BP: 125/80 131/79 140/77 134/85   Pulse: 77 84 70 63   Patient Position - Orthostatic VS: Lying Sitting Lying Lying         Visual Acuity  Visual Acuity      Most Recent Value   L Pupil Size (mm)  3   R Pupil Size (mm)  3   L Pupil Shape  Round   R Pupil Shape  Round          ED Medications  Medications   sodium chloride 0 9 % infusion (75 mL/hr Intravenous New Bag 4/28/21 0010)   acetaminophen (TYLENOL) tablet 650 mg (has no administration in time range)   ondansetron (ZOFRAN) injection 4 mg (has no administration in time range)   aluminum-magnesium hydroxide-simethicone (MYLANTA) oral suspension 30 mL (has no administration in time range)   pantoprazole (PROTONIX) injection 40 mg (40 mg Intravenous Given 4/27/21 2149)   morphine injection 2 mg (2 mg Intravenous Given 4/28/21 0008)   sodium chloride 0 9 % bolus 1,000 mL (0 mL Intravenous Stopped 4/27/21 2149)   ondansetron (ZOFRAN) injection 4 mg (4 mg Intravenous Given 4/27/21 2005)   morphine (PF) 4 mg/mL injection 4 mg (4 mg Intravenous Given 4/27/21 2004)       Diagnostic Studies  Results Reviewed     Procedure Component Value Units Date/Time    Hepatic function panel [[id number]]  (Abnormal) Collected: 04/27/21 2007    Lab Status: Final result Specimen: Blood from Arm, Right Updated: 04/27/21 2048     Total Bilirubin 1 79 mg/dL      Bilirubin, Direct 1 40 mg/dL      Alkaline Phosphatase 277 U/L      AST 1,068 U/L       U/L      Total Protein 7 9 g/dL      Albumin 3 7 g/dL     Basic metabolic panel [[id number]] Collected: 04/27/21 2007    Lab Status: Final result Specimen: Blood from Arm, Right Updated: 04/27/21 2045     Sodium 140 mmol/L      Potassium 3 5 mmol/L      Chloride 102 mmol/L      CO2 27 mmol/L      ANION GAP 11 mmol/L      BUN 12 mg/dL      Creatinine 0 96 mg/dL      Glucose 111 mg/dL      Calcium 9 1 mg/dL      eGFR 72 ml/min/1 73sq m     Narrative:      [place] guidelines for Chronic Kidney Disease (CKD):     Stage 1 with normal or high GFR (GFR > 90 mL/min/1 73 square meters)    Stage 2 Mild CKD (GFR = 60-89 mL/min/1 73 square meters)    Stage 3A Moderate CKD (GFR = 45-59 mL/min/1 73 square meters)    Stage 3B Moderate CKD (GFR = 30-44 mL/min/1 73 square meters)    Stage 4 Severe CKD (GFR = 15-29 mL/min/1 73 square meters)    Stage 5 End Stage CKD (GFR <15 mL/min/1 73 square meters)  Note: GFR calculation is accurate only with a steady state creatinine    Lipase [[id number]]  (Normal) Collected: 04/27/21 2007    Lab Status: Final result Specimen: Blood from Arm, Right Updated: 04/27/21 2045     Lipase 83 u/L     CBC and differential [[id number]] Collected: 04/27/21 2007    Lab Status: Final result Specimen: Blood from Arm, Right Updated: 04/27/21 2017     WBC 7 48 Thousand/uL      RBC 4 25 Million/uL      Hemoglobin 14 1 g/dL      Hematocrit 41 6 %      MCV 98 fL      MCH 33 2 pg      MCHC 33 9 g/dL      RDW 12 9 %      MPV 9 8 fL      Platelets 214 Thousands/uL      nRBC 0 /100 WBCs      Neutrophils Relative 66 %      Immat GRANS % 0 %      Lymphocytes Relative 25 %      Monocytes Relative 8 %      Eosinophils Relative 1 %      Basophils Relative 0 %      Neutrophils Absolute 4 93 Thousands/µL      Immature Grans Absolute 0 03 Thousand/uL      Lymphocytes Absolute 1 83 Thousands/µL      Monocytes Absolute 0 57 Thousand/µL      Eosinophils Absolute 0 10 Thousand/µL      Basophils Absolute 0 02 Thousands/µL     POCT pregnancy, urine [[id number]]  (Normal) Resulted: 04/27/21 2011    Lab Status: Final result Updated: 04/27/21 2011     EXT PREG TEST UR (Ref: Negative) negative     Control valid                 No orders to display              Procedures  Procedures         ED Course  ED Course as of Apr 28 0115   Tue Apr 27, 2021 2014 PREGNANCY TEST URINE: negative   2017 WBC: 7 48   2017 Hemoglobin: 14 1   2017 HCT: 41 6   2017 Platelet Count: 036   2051 0 39 yesterday   TOTAL BILIRUBIN(!): 1 79   2051 197 yesterday   Alkaline Phosphatase(!): 277   2051 266 yesterday   AST(!): 1,068   2051 385 yesterday   ALT(!): 705   2051 Lipase: 83   2051 Sodium: 140   2051 Creatinine: 0 96   2053 CT A/P 4/26:   No acute abnormality      Rounded structure in the region of the common bile duct, which appears to have been present dating back to 0608, but of uncertain etiology, 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 No Filed at: 04/27/2021 2026                    [facility]  Number of Diagnoses or Management Options  Elevated LFTs:   Epigastric abdominal pain:   Hyperbilirubinemia:   Diagnosis management comments: 81-year-old female with a hx cholecystectomy who presents with epigastric abdominal pain  Patient present to the emergency department yesterday, was admitted to Internal Medicine, but signed out [facility] earlier today  Patient reports recurrence yet unchanged pain  Labs significant for elevated LFTs, hyperbilirubinemia  CT scan from yesterday performed demonstrates duodenal diverticulum, possibly exerting mass effect on the common bile duct  Patient to be admitted to Internal Medicine, with GI consultation as patient will need an ERCP  Patient keen on plan         Amount and/or Complexity of Data Reviewed  Clinical lab tests: ordered and reviewed  Review and summarize past medical records: yes        Disposition  Final diagnoses:   Epigastric abdominal pain   Elevated LFTs   Hyperbilirubinemia     Time reflects when diagnosis was documented in both MDM as applicable and the Disposition within this note     Time User Action Codes Description Comment    4/27/2021  8:52 PM Josue Isaac Add [R10 13] Epigastric abdominal pain     4/27/2021  9:17 PM Veto Garcia Add [R11 2] Nausea and vomiting, intractability of vomiting not specified, unspecified vomiting type     4/27/2021  9:17 PM Veto Garcia Add [R10 9] Abdominal pain, unspecified abdominal location     4/27/2021  9:24 PM Josue Isaac Add [R79 89] Elevated LFTs     4/27/2021  9:24 PM Justin Chaudhari 4, 15[facility] [E80 6] Hyperbilirubinemia       ED Disposition     ED Disposition Condition Date/Time Comment    Admit Stable Tue Apr 27, 2021  9:24 PM Case was discussed with Dr Octaviano Vega and the patient's admission status was agreed to be Admission Status: inpatient status to the service of Dr Octaviano Vega   Follow-up Information    None         Patient's Medications    No medications on file     No discharge procedures on file      PDMP Review     None          ED Provider  Electronically Signed by           Angela Mahmood PA-C  04/28/21 1125

## 2021-04-28 NOTE — DISCHARGE SUMMARY
Discharge Summary - [facility]    Patient Information: Wilda Faustin 40 y o  female MRN: [id number]  Unit/Bed#: [address] 87 201-01 Encounter: [id number]    Discharging Physician / Practitioner: Jin Segovia DO  PCP: Latesha Cobos DO  Admission Date: 4/27/2021  Discharge Date: 04/28/21    Disposition:     Discharged to [facility]    Reason for Admission: abdominal pain    Discharge Diagnoses:     Principal Problem:    Abdominal pain  Active Problems:    Elevated LFTs    Nausea & vomiting  Resolved Problems:    * No resolved hospital problems  *      Consultations During Hospital Stay:  · gi    Procedures Performed:     · none    Significant Findings / Test Results:   Mri Abdomen Wo Contrast  Result Date: 4/28/2021  Impression: Choledocholithiasis with  Intrahepatic and extrahepatic biliary ductal dilation The abnormality noted on the CT scan corresponds to a 2 0 x 1 3 cm CBD stone    Ct Abdomen Pelvis With Contrast  Result Date: 4/26/2021  Impression: No acute abnormality  Rounded structure in the region of the common bile duct, which appears to have been present dating back to 1184, but of uncertain etiology, 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  There is some mass effect on the common bile duct and it is unclear whether this is causing impedance of biliary flow  Recommend MRI/MRCP for complete evaluation  Hospital Course:     Wilda Faustin is a 40 y o  female patient who originally presented to the hospital on 4/27/2021 due to ruq abd pain with elevated lfts and abnormal ct  Pt had been admitted previously and left ama  She then represented with abdominal pain and elevated lfts  She had an mrcp that was positive for a 2cm x1 3cm cbd stone  She was started on prophylactic antibiotics and gi recommended transfer to [place] due to size of the stone  She was accepted at [facility]       Discharge Day Visit / Exam:     * Please refer to separate progress note for these details *    Discharge instructions/Information to patient and family:   See after visit summary for information provided to patient and family  Provisions for Follow-Up Care:  See after visit summary for information related to follow-up care and any pertinent home health orders  Discharge Medications:  See after visit summary for reconciled discharge medications provided to patient and family

## 2021-04-28 NOTE — CASE MANAGEMENT
LOS 1  Not a readmission/bundle patient  Patient admitted as a result of abdominal pain  Patient was alert and oriented  Patient identify her  as emergency  Junior Elaine   Patient resides [address] , second floor with 12 steps to access main entrance  Patient lives with her  and 25years old younger daughter  Patient is currently unemployed  Patient is independent with all ADL'S  Patient reports no use of illicit drugs/alcohol  Patient denies any psychiatric diagnosis  Patient denies any legal issues  Patient's PCP [address] [place], [place]  Patient,s  will assist with transport at discharge  Patient reportas no other issues  SW/CM to follow

## 2021-04-28 NOTE — ED NOTES
Pt asking for something for pain   States pain 7/10     [facility], [facility]  04/28/21 9229

## 2021-04-28 NOTE — ED NOTES
Patient is tearful while RN at bedside  Patient is denying any pain at this time       Maria Isabel Faye, BAM  04/28/21 1064

## 2021-04-28 NOTE — CONSULTS
Consultation - [facility]  Shubham Meter 40 y o  female MRN: [id number]  Unit/Bed#: ED 28 Encounter: [id number]        Inpatient consult to gastroenterology  Consult performed by: Radha Good PA-C  Consult ordered by: Kristie Summers PA-C    Inpatient consult to gastroenterology  Consult performed by: Radha Good PA-C  Consult ordered by: Walker Strauss PA-C          Reason for Consult / Principal Problem: concern for biliary obstruction, abdominal pain, nausea, vomiting    HPI: 20-year-old female with history of cholecystectomy presenting for evaluation of epigastric pain  She reports onset of stabbing squeezing epigastric pain about 3 days ago, but the pain became unbearable and constant 2 days ago  She recalls having similar pain before her cholecystectomy 5-6 years ago  She then had similar pain 2-3 months later and required what sounds like ERCP for biliary stones at [facility]  She could not keep down any solid food due to intractable vomiting and pain  She denies any blood in the vomit  She denies fevers and chills  She denies any diarrhea, constipation, rectal bleeding  She was alternating aspirin and Tylenol at home for the abdominal pain  I saw her in the emergency room yesterday and recommended MRCP due to abnormal CT scan showing possible biliary obstruction however she left AMA as she had family matters to attend  She is now back with persistent pain  REVIEW OF SYSTEMS:    CONSTITUTIONAL: Denies any fever, chills, or rigors  Good appetite, and no recent weight loss  HEENT: No earache or tinnitus  Denies hearing loss or visual disturbances  CARDIOVASCULAR: No chest pain or palpitations  RESPIRATORY: Denies any cough, hemoptysis, shortness of breath or dyspnea on exertion  GASTROINTESTINAL: As noted in the History of Present Illness  GENITOURINARY: No problems with urination  Denies any hematuria or dysuria    NEUROLOGIC: No dizziness or vertigo, denies headaches  MUSCULOSKELETAL: Denies any muscle or joint pain  SKIN: Denies skin rashes or itching  ENDOCRINE: Denies excessive thirst  Denies intolerance to heat or cold  PSYCHOSOCIAL: Denies depression or anxiety  Denies any recent memory loss  Historical Information   Past Medical History:   Diagnosis Date    Asthma     Gallstone      History reviewed  No pertinent surgical history  Social History   Social History     Substance and Sexual Activity   Alcohol Use Not Currently     Social History     Substance and Sexual Activity   Drug Use Not Currently     Social History     Tobacco Use   Smoking Status Current Every Day Smoker    Packs/day: 1 00   Smokeless Tobacco Current User     History reviewed  No pertinent family history  Meds/Allergies     (Not in a hospital admission)    Current Facility-Administered Medications   Medication Dose Route Frequency    aluminum-magnesium hydroxide-simethicone (MYLANTA) oral suspension 30 mL  30 mL Oral Q6H PRN    LORazepam (ATIVAN) injection 1 mg  1 mg Intravenous Once    morphine injection 2 mg  2 mg Intravenous Q4H PRN    ondansetron (ZOFRAN) injection 4 mg  4 mg Intravenous Q6H PRN    oxyCODONE (ROXICODONE) IR tablet 5 mg  5 mg Oral Q4H PRN    pantoprazole (PROTONIX) injection 40 mg  40 mg Intravenous Q12H [address]    sodium chloride 0 9 % infusion  75 mL/hr Intravenous Continuous       No Known Allergies        Objective     Blood pressure 121/73, pulse 61, temperature 98 °F (36 7 °C), temperature source Oral, resp  rate 16, height 5' 2" (1 575 m), weight 64 1 kg (141 lb 5 oz), SpO2 100 %        Intake/Output Summary (Last 24 hours) at 4/28/2021 0936  Last data filed at 4/27/2021 2149  Gross per 24 hour   Intake 1000 ml   Output --   Net 1000 ml         PHYSICAL EXAM:      General Appearance:   Alert, appears uncomfortable, cooperative, no distress, appears stated age    HEENT:   Normocephalic, atraumatic, anicteric      Neck:  Supple, symmetrical, trachea midline, no adenopathy   Lungs:   Clear to auscultation bilaterally   Heart[de-identified]   S1 and S2 normal; regular rate and rhythm   Abdomen:   Nondistended  Normal bowel sounds  Soft  Moderate epigastric tenderness to palpation  No rebound or guarding  Genitalia:   Deferred    Rectal:   Deferred    Extremities:  No cyanosis, clubbing or edema    Pulses:  2+ and symmetric all extremities    Skin:  No jaundice   Lymph nodes:  No palpable cervical lymphadenopathy        Lab Results:   Results from last 7 days   Lab Units 04/28/21  0534   WBC Thousand/uL 7 04   HEMOGLOBIN g/dL 12 2   HEMATOCRIT % 36 8   PLATELETS Thousands/uL 269   NEUTROS PCT % 50   LYMPHS PCT % 38   MONOS PCT % 8   EOS PCT % 4     Results from last 7 days   Lab Units 04/28/21  0534   POTASSIUM mmol/L 3 9   CHLORIDE mmol/L 110*   CO2 mmol/L 26   BUN mg/dL 10   CREATININE mg/dL 0 78   CALCIUM mg/dL 8 1*   ALK PHOS U/L 216*   ALT U/L 565*   AST U/L 400*         Results from last 7 days   Lab Units 04/27/21 2007   LIPASE u/L 83       Imaging Studies: I have personally reviewed pertinent imaging studies  No results found  ASSESSMENT and PLAN:      28-year-old female with history of cholecystectomy and choledocholithiasis s/p ERCP presenting for evaluation of epigastric pain and elevated liver enzymes  1) Choledocholithiasis:  She presented with epigastric pain found to have elevated liver enzymes and large CBD stone on imaging  MRCP shows dilated CBD with large 2 cm x 1 3 cm CBD stone and mild dilation of left and right hepatic duct  Bilirubin within normal limits  No leukocytosis  She is afebrile  No evidence of cholangitis at this time       -recommend transfer to [facility] for ERCP due to large size of stone  -continue ancef and flagyl for now  -monitor liver enzymes  -monitor white blood cell count and temperature  -monitor abdominal exam    2) Elevated liver enzymes: She had spike in transaminases overnight - AST 1068, , alk-phos 277, total bili 1 79  Today enzymes are coming down , , alk-phos 216, total bili 0 35  This is likely secondary to biliary obstruction  Other differentials include viral hepatitis, drug-induced liver injury, ischemic hepatitis, autoimmune liver disease     -monitor liver enzymes  -follow-up viral serologies, autoimmune serologies    Patient was seen and examined by Dr Eugenia Jessica  All romero medical decisions were made by Dr Eugenia Jessica  Thank you for allowing us to participate in the care of this present patient  We will follow-up with you closely

## 2021-04-28 NOTE — H&P
Darius Hercules [facility]  1975, 40 y o  female MRN: [id number]  Unit/Bed#: ED 32 Encounter: [id number]  Primary Care Provider: Almeta Pallas, DO   Date and time admitted to hospital: 4/27/2021  7:19 PM    * Abdominal pain  Assessment & Plan  Patient had left AMA to take care of family matters  Returns tonight with same complaint  Presents with 3 days of epigastric abdominal pain radiating to the back with associated nausea, vomiting, poor p o  Intake  Patient notes the pain is worse after eating  Notes she has not tolerated any significant p o  Intake in the past 3 days  Patient was also noted to have concurrent transaminitis  Her pain is located primarily in the epigastric region however not the right upper quadrant  Will start proton pump inhibitor  GI consult  Was scheduled for MRCP today but patient left AMA  Elevated LFTs  Assessment & Plan  Results from last 7 days   Lab Units 04/27/21 2007 04/27/21  0501 04/26/21 2010   AST U/L 1,068* 266* 551*   ALT U/L 705* 385* 406*   ALK PHOS U/L 277* 197* 236*     Patient presented with a transaminitis with an AST of 551, ALT of 406, and Alk phos of 236  - Initially improved with fluids and NPO  Patient left AMA due to family matters and returned tonight with worsening transaminitis  Previously underwent cholecystectomy  -In 2015 status post cholecystectomy she was noted to have 3 stones in the common bile duct on MRCP that required ERCP   Appreciate GI evaluation recommendations  MRCP pending    Nausea & vomiting  Assessment & Plan  Patient presents nausea, vomiting, and epigastric pain  Possibly secondary to peptic ulcer disease vs CBD compression  Start Protonix 40 mg IV b i d  Analgesics  GI consult  Appreciate recs        VTE Prophylaxis: Ambulate patient  / sequential compression device   Code Status:  Level 1 full code  POLST: There is no POLST form on file for this patient (pre-hospital)  Discussion with family: Patient    Anticipated Length of Stay:  Patient will be admitted on an Inpatient basis with an anticipated length of stay of  greater than 2 midnights  Justification for Hospital Stay:  Abdominal pain requiring further GI evaluation    Total Time for Visit, including Counseling / Coordination of Care: 30 minutes  Greater than 50% of this total time spent on direct patient counseling and coordination of care  Chief Complaint:   Abdominal pain    History of Present Illness:    Maria Guadalupe Ascencio is a 40 y o  female s/p cholecystectomy who represents after leaving AMA for family matters with abdominal pain x3 days  Patient developed acute onset intermittent abdominal pain which radiates to her back, and is associated with nausea, vomiting, poor p o  Intake  She states this feels similar to previous episode of cholecystitis  She is s/p cholecystectomy, and required ERCP x1 post cholecystectomy for retained gallstone  She has tried Tylenol on aspirin without relief  She denies fevers, chills, chest pain, shortness of breath, hematemesis, melena, hematochezia, changes in bowel and urinary habits  In the ED, lab work was significant for markedly elevated LFTs  WBC 7 5  Rest of lab work is unremarkable  UA was significant for urobilinogen  CT abdomen revealed mass effect compressing the CBD, likely from a duodenal diverticulum  Review of Systems:    Review of Systems   Constitutional: Positive for appetite change  Negative for chills, fatigue and fever  HENT: Negative for ear pain, sore throat and trouble swallowing  Eyes: Negative for visual disturbance  Respiratory: Negative for cough, chest tightness, shortness of breath and wheezing  Cardiovascular: Negative for chest pain, palpitations and leg swelling  Gastrointestinal: Positive for abdominal pain, nausea and vomiting  Negative for abdominal distention and diarrhea  Endocrine: Negative  Genitourinary: Negative for dysuria  Musculoskeletal: Negative for gait problem and myalgias  Skin: Negative for pallor  Allergic/Immunologic: Negative for immunocompromised state  Neurological: Negative for dizziness, syncope, light-headedness, numbness and headaches  Past Medical and Surgical History:     Past Medical History:   Diagnosis Date    Asthma     Gallstone        History reviewed  No pertinent surgical history  Meds/Allergies:    Prior to Admission medications    Not on File     I have reviewed home medications with patient personally  Allergies: No Known Allergies    Social History:     Marital Status: Single   Occupation:  Noncontributory  Patient Pre-hospital Living Situation:  Home  Patient Pre-hospital Level of Mobility:  Independent  Patient Pre-hospital Diet Restrictions:  None  Substance Use History:   Social History     Substance and Sexual Activity   Alcohol Use Not Currently     Social History     Tobacco Use   Smoking Status Current Every Day Smoker    Packs/day: 1 00   Smokeless Tobacco Current User     Social History     Substance and Sexual Activity   Drug Use Not Currently       Family History:    non-contributory    Physical Exam:     Vitals:   Blood Pressure: 125/80 (04/27/21 2032)  Pulse: 77 (04/27/21 2032)  Temperature: 98 °F (36 7 °C) (04/27/21 1913)  Temp Source: Oral (04/27/21 1913)  Respirations: 16 (04/27/21 2032)  Weight - Scale: 64 1 kg (141 lb 5 oz) (04/27/21 1913)  SpO2: 99 % (04/27/21 2032)    Physical Exam  Vitals signs and nursing note reviewed  Constitutional:       Appearance: Normal appearance  HENT:      Head: Normocephalic and atraumatic  Mouth/Throat:      Mouth: Mucous membranes are moist       Pharynx: Oropharynx is clear  No oropharyngeal exudate  Eyes:      Extraocular Movements: Extraocular movements intact  Cardiovascular:      Rate and Rhythm: Normal rate and regular rhythm  Pulses: Normal pulses  Heart sounds: Normal heart sounds  No murmur   No friction rub  No gallop  Pulmonary:      Effort: Pulmonary effort is normal  No respiratory distress  Breath sounds: Normal breath sounds  No stridor  No wheezing or rales  Abdominal:      General: Abdomen is flat  Bowel sounds are normal  There is no distension  Palpations: Abdomen is soft  Tenderness: There is abdominal tenderness (epigastrium)  Musculoskeletal:      Right lower leg: No edema  Left lower leg: No edema  Skin:     General: Skin is warm and dry  Neurological:      General: No focal deficit present  Mental Status: She is alert and oriented to person, place, and time  Additional Data:     Lab Results: I have personally reviewed pertinent reports  Results from last 7 days   Lab Units 04/27/21 2007   WBC Thousand/uL 7 48   HEMOGLOBIN g/dL 14 1   HEMATOCRIT % 41 6   PLATELETS Thousands/uL 319   NEUTROS PCT % 66   LYMPHS PCT % 25   MONOS PCT % 8   EOS PCT % 1     Results from last 7 days   Lab Units 04/27/21 2007   SODIUM mmol/L 140   POTASSIUM mmol/L 3 5   CHLORIDE mmol/L 102   CO2 mmol/L 27   BUN mg/dL 12   CREATININE mg/dL 0 96   ANION GAP mmol/L 11   CALCIUM mg/dL 9 1   ALBUMIN g/dL 3 7   TOTAL BILIRUBIN mg/dL 1 79*   ALK PHOS U/L 277*   ALT U/L 705*   AST U/L 1,068*   GLUCOSE RANDOM mg/dL 111                       Imaging: I have personally reviewed pertinent reports  No orders to display       EKG, Pathology, and Other Studies Reviewed on Admission:   · EKG    Allscripts / Epic Records Reviewed: Yes     ** Please Note: This note has been constructed using a voice recognition system   **

## 2021-04-29 ENCOUNTER — ANESTHESIA EVENT (INPATIENT)
Dept: GASTROENTEROLOGY | Facility: HOSPITAL | Age: 46
DRG: 284 | End: 2021-04-29
Payer: COMMERCIAL

## 2021-04-29 ENCOUNTER — HOSPITAL ENCOUNTER (OUTPATIENT)
Dept: RADIOLOGY | Facility: HOSPITAL | Age: 46
Discharge: HOME | DRG: 284 | End: 2021-04-29
Payer: COMMERCIAL

## 2021-04-29 ENCOUNTER — ANESTHESIA (INPATIENT)
Dept: GASTROENTEROLOGY | Facility: HOSPITAL | Age: 46
DRG: 284 | End: 2021-04-29
Payer: COMMERCIAL

## 2021-04-29 ENCOUNTER — APPOINTMENT (OUTPATIENT)
Dept: GASTROENTEROLOGY | Facility: HOSPITAL | Age: 46
DRG: 284 | End: 2021-04-29
Payer: COMMERCIAL

## 2021-04-29 LAB
ALBUMIN SERPL BCP-MCNC: 3.1 G/DL (ref 3.5–5)
ALBUMIN SERPL BCP-MCNC: 3.4 G/DL (ref 3.5–5)
ALP SERPL-CCNC: 176 U/L (ref 46–116)
ALP SERPL-CCNC: 180 U/L (ref 46–116)
ALT SERPL W P-5'-P-CCNC: 306 U/L (ref 12–78)
ALT SERPL W P-5'-P-CCNC: 381 U/L (ref 12–78)
ANION GAP SERPL CALCULATED.3IONS-SCNC: 3 MMOL/L (ref 4–13)
ANION GAP SERPL CALCULATED.3IONS-SCNC: 9 MMOL/L (ref 4–13)
AST SERPL W P-5'-P-CCNC: 119 U/L (ref 5–45)
AST SERPL W P-5'-P-CCNC: 138 U/L (ref 5–45)
BASOPHILS # BLD AUTO: 0.01 THOUSANDS/ΜL (ref 0–0.1)
BASOPHILS NFR BLD AUTO: 0 % (ref 0–1)
BILIRUB SERPL-MCNC: 0.43 MG/DL (ref 0.2–1)
BILIRUB SERPL-MCNC: 0.52 MG/DL (ref 0.2–1)
BUN SERPL-MCNC: 7 MG/DL (ref 5–25)
BUN SERPL-MCNC: 8 MG/DL (ref 5–25)
CALCIUM ALBUM COR SERPL-MCNC: 9.4 MG/DL (ref 8.3–10.1)
CALCIUM ALBUM COR SERPL-MCNC: 9.4 MG/DL (ref 8.3–10.1)
CALCIUM SERPL-MCNC: 8.7 MG/DL (ref 8.3–10.1)
CALCIUM SERPL-MCNC: 8.9 MG/DL (ref 8.3–10.1)
CHLORIDE SERPL-SCNC: 110 MMOL/L (ref 100–108)
CHLORIDE SERPL-SCNC: 113 MMOL/L (ref 100–108)
CMV IGG SERPL IA-ACNC: >10 U/ML (ref 0–0.59)
CMV IGM SERPL IA-ACNC: <30 AU/ML (ref 0–29.9)
CO2 SERPL-SCNC: 21 MMOL/L (ref 21–32)
CO2 SERPL-SCNC: 28 MMOL/L (ref 21–32)
CREAT SERPL-MCNC: 0.83 MG/DL (ref 0.6–1.3)
CREAT SERPL-MCNC: 0.93 MG/DL (ref 0.6–1.3)
EBV NA IGG SER IA-ACNC: 106 U/ML (ref 0–17.9)
EBV VCA IGG SER IA-ACNC: >600 U/ML (ref 0–17.9)
EBV VCA IGM SER IA-ACNC: <36 U/ML (ref 0–35.9)
EOSINOPHIL # BLD AUTO: 0 THOUSAND/ΜL (ref 0–0.61)
EOSINOPHIL NFR BLD AUTO: 0 % (ref 0–6)
ERYTHROCYTE [DISTWIDTH] IN BLOOD BY AUTOMATED COUNT: 13 % (ref 11.6–15.1)
ERYTHROCYTE [DISTWIDTH] IN BLOOD BY AUTOMATED COUNT: 13.2 % (ref 11.6–15.1)
GFR SERPL CREATININE-BSD FRML MDRD: 74 ML/MIN/1.73SQ M
GFR SERPL CREATININE-BSD FRML MDRD: 85 ML/MIN/1.73SQ M
GLUCOSE SERPL-MCNC: 149 MG/DL (ref 65–140)
GLUCOSE SERPL-MCNC: 87 MG/DL (ref 65–140)
HCT VFR BLD AUTO: 38 % (ref 34.8–46.1)
HCT VFR BLD AUTO: 38.9 % (ref 34.8–46.1)
HGB BLD-MCNC: 12.6 G/DL (ref 11.5–15.4)
HGB BLD-MCNC: 13.4 G/DL (ref 11.5–15.4)
IMM GRANULOCYTES # BLD AUTO: 0.04 THOUSAND/UL (ref 0–0.2)
IMM GRANULOCYTES NFR BLD AUTO: 0 % (ref 0–2)
INTERPRETATION: ABNORMAL
LIPASE SERPL-CCNC: 52 U/L (ref 73–393)
LYMPHOCYTES # BLD AUTO: 0.73 THOUSANDS/ΜL (ref 0.6–4.47)
LYMPHOCYTES NFR BLD AUTO: 7 % (ref 14–44)
MCH RBC QN AUTO: 33.4 PG (ref 26.8–34.3)
MCH RBC QN AUTO: 33.5 PG (ref 26.8–34.3)
MCHC RBC AUTO-ENTMCNC: 33.2 G/DL (ref 31.4–37.4)
MCHC RBC AUTO-ENTMCNC: 34.4 G/DL (ref 31.4–37.4)
MCV RBC AUTO: 101 FL (ref 82–98)
MCV RBC AUTO: 97 FL (ref 82–98)
MITOCHONDRIA M2 IGG SER-ACNC: <20 UNITS (ref 0–20)
MONOCYTES # BLD AUTO: 0.16 THOUSAND/ΜL (ref 0.17–1.22)
MONOCYTES NFR BLD AUTO: 1 % (ref 4–12)
NEUTROPHILS # BLD AUTO: 10.34 THOUSANDS/ΜL (ref 1.85–7.62)
NEUTS SEG NFR BLD AUTO: 92 % (ref 43–75)
NRBC BLD AUTO-RTO: 0 /100 WBCS
PLATELET # BLD AUTO: 283 THOUSANDS/UL (ref 149–390)
PLATELET # BLD AUTO: 297 THOUSANDS/UL (ref 149–390)
PMV BLD AUTO: 10.1 FL (ref 8.9–12.7)
PMV BLD AUTO: 9.7 FL (ref 8.9–12.7)
POTASSIUM SERPL-SCNC: 3 MMOL/L (ref 3.5–5.3)
POTASSIUM SERPL-SCNC: 3.6 MMOL/L (ref 3.5–5.3)
PROT SERPL-MCNC: 6.7 G/DL (ref 6.4–8.2)
PROT SERPL-MCNC: 7.5 G/DL (ref 6.4–8.2)
RBC # BLD AUTO: 3.76 MILLION/UL (ref 3.81–5.12)
RBC # BLD AUTO: 4.01 MILLION/UL (ref 3.81–5.12)
SODIUM SERPL-SCNC: 141 MMOL/L (ref 136–145)
SODIUM SERPL-SCNC: 143 MMOL/L (ref 136–145)
WBC # BLD AUTO: 11.28 THOUSAND/UL (ref 4.31–10.16)
WBC # BLD AUTO: 7.92 THOUSAND/UL (ref 4.31–10.16)

## 2021-04-29 PROCEDURE — 85027 COMPLETE CBC AUTOMATED: CPT | Performed by: INTERNAL MEDICINE

## 2021-04-29 PROCEDURE — 0F798DZ DILATION OF COMMON BILE DUCT WITH INTRALUMINAL DEVICE, VIA NATURAL OR ARTIFICIAL OPENING ENDOSCOPIC: ICD-10-PCS | Performed by: INTERNAL MEDICINE

## 2021-04-29 PROCEDURE — C9113 INJ PANTOPRAZOLE SODIUM, VIA: HCPCS | Performed by: INTERNAL MEDICINE

## 2021-04-29 PROCEDURE — 80053 COMPREHEN METABOLIC PANEL: CPT | Performed by: INTERNAL MEDICINE

## 2021-04-29 PROCEDURE — NC001 PR NO CHARGE: Performed by: INTERNAL MEDICINE

## 2021-04-29 PROCEDURE — 0FC98ZZ EXTIRPATION OF MATTER FROM COMMON BILE DUCT, VIA NATURAL OR ARTIFICIAL OPENING ENDOSCOPIC: ICD-10-PCS | Performed by: INTERNAL MEDICINE

## 2021-04-29 PROCEDURE — 43274 ERCP DUCT STENT PLACEMENT: CPT | Performed by: INTERNAL MEDICINE

## 2021-04-29 PROCEDURE — 74328 X-RAY BILE DUCT ENDOSCOPY: CPT

## 2021-04-29 PROCEDURE — 43265 ERCP LITHOTRIPSY CALCULI: CPT | Performed by: INTERNAL MEDICINE

## 2021-04-29 PROCEDURE — C1726 CATH, BAL DIL, NON-VASCULAR: HCPCS

## 2021-04-29 PROCEDURE — C1769 GUIDE WIRE: HCPCS

## 2021-04-29 PROCEDURE — C2617 STENT, NON-COR, TEM W/O DEL: HCPCS

## 2021-04-29 PROCEDURE — 99232 SBSQ HOSP IP/OBS MODERATE 35: CPT | Performed by: INTERNAL MEDICINE

## 2021-04-29 PROCEDURE — 80053 COMPREHEN METABOLIC PANEL: CPT | Performed by: STUDENT IN AN ORGANIZED HEALTH CARE EDUCATION/TRAINING PROGRAM

## 2021-04-29 PROCEDURE — 83690 ASSAY OF LIPASE: CPT | Performed by: STUDENT IN AN ORGANIZED HEALTH CARE EDUCATION/TRAINING PROGRAM

## 2021-04-29 PROCEDURE — 85025 COMPLETE CBC W/AUTO DIFF WBC: CPT | Performed by: STUDENT IN AN ORGANIZED HEALTH CARE EDUCATION/TRAINING PROGRAM

## 2021-04-29 RX ORDER — SODIUM CHLORIDE, SODIUM LACTATE, POTASSIUM CHLORIDE, CALCIUM CHLORIDE 600; 310; 30; 20 MG/100ML; MG/100ML; MG/100ML; MG/100ML
50 INJECTION, SOLUTION INTRAVENOUS CONTINUOUS
Status: DISPENSED | OUTPATIENT
Start: 2021-04-29 | End: 2021-04-30

## 2021-04-29 RX ORDER — POTASSIUM CHLORIDE 20 MEQ/1
40 TABLET, EXTENDED RELEASE ORAL ONCE
Status: COMPLETED | OUTPATIENT
Start: 2021-04-29 | End: 2021-04-29

## 2021-04-29 RX ORDER — ROCURONIUM BROMIDE 10 MG/ML
INJECTION, SOLUTION INTRAVENOUS AS NEEDED
Status: DISCONTINUED | OUTPATIENT
Start: 2021-04-29 | End: 2021-04-29

## 2021-04-29 RX ORDER — SODIUM CHLORIDE, SODIUM LACTATE, POTASSIUM CHLORIDE, CALCIUM CHLORIDE 600; 310; 30; 20 MG/100ML; MG/100ML; MG/100ML; MG/100ML
INJECTION, SOLUTION INTRAVENOUS CONTINUOUS PRN
Status: DISCONTINUED | OUTPATIENT
Start: 2021-04-29 | End: 2021-04-29

## 2021-04-29 RX ORDER — PROPOFOL 10 MG/ML
INJECTION, EMULSION INTRAVENOUS AS NEEDED
Status: DISCONTINUED | OUTPATIENT
Start: 2021-04-29 | End: 2021-04-29

## 2021-04-29 RX ORDER — ONDANSETRON 2 MG/ML
4 INJECTION INTRAMUSCULAR; INTRAVENOUS ONCE AS NEEDED
Status: COMPLETED | OUTPATIENT
Start: 2021-04-29 | End: 2021-04-29

## 2021-04-29 RX ORDER — HYDROMORPHONE HCL/PF 1 MG/ML
0.5 SYRINGE (ML) INJECTION EVERY 4 HOURS PRN
Status: DISCONTINUED | OUTPATIENT
Start: 2021-04-29 | End: 2021-04-30 | Stop reason: HOSPADM

## 2021-04-29 RX ORDER — ALBUTEROL SULFATE 2.5 MG/3ML
2.5 SOLUTION RESPIRATORY (INHALATION) ONCE AS NEEDED
Status: DISCONTINUED | OUTPATIENT
Start: 2021-04-29 | End: 2021-04-30 | Stop reason: HOSPADM

## 2021-04-29 RX ORDER — SODIUM CHLORIDE, SODIUM LACTATE, POTASSIUM CHLORIDE, CALCIUM CHLORIDE 600; 310; 30; 20 MG/100ML; MG/100ML; MG/100ML; MG/100ML
125 INJECTION, SOLUTION INTRAVENOUS CONTINUOUS
Status: DISCONTINUED | OUTPATIENT
Start: 2021-04-29 | End: 2021-04-29

## 2021-04-29 RX ORDER — LIDOCAINE HYDROCHLORIDE 10 MG/ML
INJECTION, SOLUTION EPIDURAL; INFILTRATION; INTRACAUDAL; PERINEURAL AS NEEDED
Status: DISCONTINUED | OUTPATIENT
Start: 2021-04-29 | End: 2021-04-29

## 2021-04-29 RX ORDER — CEFAZOLIN SODIUM 1 G/3ML
INJECTION, POWDER, FOR SOLUTION INTRAMUSCULAR; INTRAVENOUS AS NEEDED
Status: DISCONTINUED | OUTPATIENT
Start: 2021-04-29 | End: 2021-04-29

## 2021-04-29 RX ORDER — FENTANYL CITRATE 50 UG/ML
INJECTION, SOLUTION INTRAMUSCULAR; INTRAVENOUS AS NEEDED
Status: DISCONTINUED | OUTPATIENT
Start: 2021-04-29 | End: 2021-04-29

## 2021-04-29 RX ORDER — DEXAMETHASONE SODIUM PHOSPHATE 10 MG/ML
INJECTION, SOLUTION INTRAMUSCULAR; INTRAVENOUS AS NEEDED
Status: DISCONTINUED | OUTPATIENT
Start: 2021-04-29 | End: 2021-04-29

## 2021-04-29 RX ORDER — HYDROMORPHONE HCL/PF 1 MG/ML
0.5 SYRINGE (ML) INJECTION ONCE
Status: COMPLETED | OUTPATIENT
Start: 2021-04-29 | End: 2021-04-29

## 2021-04-29 RX ORDER — ONDANSETRON 2 MG/ML
INJECTION INTRAMUSCULAR; INTRAVENOUS AS NEEDED
Status: DISCONTINUED | OUTPATIENT
Start: 2021-04-29 | End: 2021-04-29

## 2021-04-29 RX ADMIN — ONDANSETRON 4 MG: 2 INJECTION INTRAMUSCULAR; INTRAVENOUS at 15:47

## 2021-04-29 RX ADMIN — METRONIDAZOLE 500 MG: 500 INJECTION, SOLUTION INTRAVENOUS at 16:43

## 2021-04-29 RX ADMIN — METRONIDAZOLE 500 MG: 500 INJECTION, SOLUTION INTRAVENOUS at 14:01

## 2021-04-29 RX ADMIN — PANTOPRAZOLE SODIUM 40 MG: 40 INJECTION, POWDER, FOR SOLUTION INTRAVENOUS at 08:20

## 2021-04-29 RX ADMIN — FENTANYL CITRATE 50 MCG: 50 INJECTION INTRAMUSCULAR; INTRAVENOUS at 15:17

## 2021-04-29 RX ADMIN — SODIUM CHLORIDE, SODIUM LACTATE, POTASSIUM CHLORIDE, AND CALCIUM CHLORIDE 125 ML/HR: .6; .31; .03; .02 INJECTION, SOLUTION INTRAVENOUS at 16:43

## 2021-04-29 RX ADMIN — MORPHINE SULFATE 2 MG: 2 INJECTION, SOLUTION INTRAMUSCULAR; INTRAVENOUS at 20:58

## 2021-04-29 RX ADMIN — ONDANSETRON 4 MG: 2 INJECTION INTRAMUSCULAR; INTRAVENOUS at 05:54

## 2021-04-29 RX ADMIN — SODIUM CHLORIDE, SODIUM LACTATE, POTASSIUM CHLORIDE, AND CALCIUM CHLORIDE 50 ML/HR: .6; .31; .03; .02 INJECTION, SOLUTION INTRAVENOUS at 17:49

## 2021-04-29 RX ADMIN — SUGAMMADEX 300 MG: 100 INJECTION, SOLUTION INTRAVENOUS at 15:26

## 2021-04-29 RX ADMIN — CEFAZOLIN SODIUM 2000 MG: 2 SOLUTION INTRAVENOUS at 16:50

## 2021-04-29 RX ADMIN — DEXAMETHASONE SODIUM PHOSPHATE 10 MG: 10 INJECTION, SOLUTION INTRAMUSCULAR; INTRAVENOUS at 14:03

## 2021-04-29 RX ADMIN — FENTANYL CITRATE 50 MCG: 50 INJECTION INTRAMUSCULAR; INTRAVENOUS at 14:41

## 2021-04-29 RX ADMIN — METRONIDAZOLE 500 MG: 500 INJECTION, SOLUTION INTRAVENOUS at 22:51

## 2021-04-29 RX ADMIN — CEFAZOLIN SODIUM 2000 MG: 2 SOLUTION INTRAVENOUS at 22:50

## 2021-04-29 RX ADMIN — METRONIDAZOLE 500 MG: 500 INJECTION, SOLUTION INTRAVENOUS at 01:05

## 2021-04-29 RX ADMIN — MORPHINE SULFATE 2 MG: 2 INJECTION, SOLUTION INTRAMUSCULAR; INTRAVENOUS at 05:51

## 2021-04-29 RX ADMIN — OXYCODONE HYDROCHLORIDE 5 MG: 5 TABLET ORAL at 20:30

## 2021-04-29 RX ADMIN — FENTANYL CITRATE 50 MCG: 50 INJECTION INTRAMUSCULAR; INTRAVENOUS at 13:45

## 2021-04-29 RX ADMIN — CEFAZOLIN 2000 MG: 1 INJECTION, POWDER, FOR SOLUTION INTRAMUSCULAR; INTRAVENOUS at 13:59

## 2021-04-29 RX ADMIN — IOHEXOL 15 ML: 240 INJECTION, SOLUTION INTRATHECAL; INTRAVASCULAR; INTRAVENOUS; ORAL at 14:15

## 2021-04-29 RX ADMIN — POTASSIUM CHLORIDE 40 MEQ: 1500 TABLET, EXTENDED RELEASE ORAL at 21:41

## 2021-04-29 RX ADMIN — PANTOPRAZOLE SODIUM 40 MG: 40 INJECTION, POWDER, FOR SOLUTION INTRAVENOUS at 20:55

## 2021-04-29 RX ADMIN — PROPOFOL 200 MG: 10 INJECTION, EMULSION INTRAVENOUS at 13:47

## 2021-04-29 RX ADMIN — ROCURONIUM BROMIDE 50 MG: 50 INJECTION, SOLUTION INTRAVENOUS at 13:47

## 2021-04-29 RX ADMIN — OXYCODONE HYDROCHLORIDE 5 MG: 5 TABLET ORAL at 16:43

## 2021-04-29 RX ADMIN — MORPHINE SULFATE 2 MG: 2 INJECTION, SOLUTION INTRAMUSCULAR; INTRAVENOUS at 17:12

## 2021-04-29 RX ADMIN — MORPHINE SULFATE 2 MG: 2 INJECTION, SOLUTION INTRAMUSCULAR; INTRAVENOUS at 18:05

## 2021-04-29 RX ADMIN — SODIUM CHLORIDE, SODIUM LACTATE, POTASSIUM CHLORIDE, AND CALCIUM CHLORIDE: .6; .31; .03; .02 INJECTION, SOLUTION INTRAVENOUS at 15:17

## 2021-04-29 RX ADMIN — FENTANYL CITRATE 50 MCG: 50 INJECTION INTRAMUSCULAR; INTRAVENOUS at 13:59

## 2021-04-29 RX ADMIN — HYDROMORPHONE HYDROCHLORIDE 0.5 MG: 1 INJECTION, SOLUTION INTRAMUSCULAR; INTRAVENOUS; SUBCUTANEOUS at 21:41

## 2021-04-29 RX ADMIN — METRONIDAZOLE 500 MG: 500 INJECTION, SOLUTION INTRAVENOUS at 08:19

## 2021-04-29 RX ADMIN — LIDOCAINE HYDROCHLORIDE 100 MG: 10 INJECTION, SOLUTION EPIDURAL; INFILTRATION; INTRACAUDAL; PERINEURAL at 13:46

## 2021-04-29 RX ADMIN — ONDANSETRON 4 MG: 2 INJECTION INTRAMUSCULAR; INTRAVENOUS at 23:49

## 2021-04-29 RX ADMIN — ONDANSETRON 4 MG: 2 INJECTION INTRAMUSCULAR; INTRAVENOUS at 17:22

## 2021-04-29 RX ADMIN — SODIUM CHLORIDE, SODIUM LACTATE, POTASSIUM CHLORIDE, AND CALCIUM CHLORIDE: .6; .31; .03; .02 INJECTION, SOLUTION INTRAVENOUS at 13:40

## 2021-04-29 RX ADMIN — CEFAZOLIN SODIUM 2000 MG: 2 SOLUTION INTRAVENOUS at 05:00

## 2021-04-29 RX ADMIN — ONDANSETRON 4 MG: 2 INJECTION INTRAMUSCULAR; INTRAVENOUS at 14:03

## 2021-04-29 NOTE — PLAN OF CARE
Problem: Potential for Falls  Goal: Patient will remain free of falls  Description: INTERVENTIONS:  - Assess patient frequently for physical needs  -  Identify cognitive and physical deficits and behaviors that affect risk of falls    -  Ford fall precautions as indicated by assessment   - Educate patient/family on patient safety including physical limitations  - Instruct patient to call for assistance with activity based on assessment  - Modify environment to reduce risk of injury  - Consider OT/PT consult to assist with strengthening/mobility  Outcome: Progressing

## 2021-04-29 NOTE — ANESTHESIA POSTPROCEDURE EVALUATION
Post-Op Assessment Note    CV Status:  Stable  Pain Score: 0    Pain management: adequate     Mental Status:  Lethargic and alert   Hydration Status:  Euvolemic   PONV Controlled:  Controlled   Airway Patency:  Patent      Post Op Vitals Reviewed: Yes      Staff: Anesthesiologist, CRNA         No complications documented      /82 (04/29/21 1533)    Temp (!) 96 9 °F (36 1 °C) (04/29/21 1533)    Pulse 103 (04/29/21 1533)   Resp 18 (04/29/21 1533)    SpO2 100 % (04/29/21 1533)

## 2021-04-29 NOTE — UTILIZATION REVIEW
Marky Bryson # [id number]    Inpatient Admission Authorization Request   NOTIFICATION OF INPATIENT ADMISSION/INPATIENT AUTHORIZATION REQUEST   SERVICING FACILITY:   [address], [facility], [facility]  Tax ID: [id number]  NPI: [id number]  Place of [facility]: [facility]patient [facility]  [address]  Place of Service Code: 24     ATTENDING PROVIDER:  Attending Name and NPI#: Kay Davis [[id number]]  Address: [address] [place] [address], [facility], [facility]  Phone: [phone]     UTILIZATION REVIEW CONTACT:  Scott Ingram, Utilization   Network Utilization Review Department  Phone: [phone]  Fax: [phone]  Email: [email or web address]     PHYSICIAN ADVISORY SERVICES:  FOR [id number]-TISG REVIEW - MEDICAL NECESSITY DENIAL  Phone: [phone]  Fax: [phone]  Email: [email or web address]     TYPE OF REQUEST:  Inpatient Status     ADMISSION INFORMATION:  ADMISSION DATE/TIME: 4/27/21 2117  PATIENT DIAGNOSIS CODE/DESCRIPTION:  Hyperbilirubinemia [E80 6]  Epigastric abdominal pain [R10 13]  Abdominal pain [R10 9]  Elevated LFTs [R79 89]  Abdominal pain, unspecified abdominal location [R10 9]  Nausea and vomiting, intractability of vomiting not specified, unspecified vomiting type [R11 2]  DISCHARGE DATE/TIME: 4/28/2021  8:40 PM  DISCHARGE DISPOSITION (IF DISCHARGED): [address] [place] [address]     IMPORTANT INFORMATION:  Please contact the Scott Ingram directly with any questions or concerns regarding this request  Department voicemails are confidential     Send requests for admission clinical reviews, concurrent reviews, approvals, and administrative denials due to lack of clinical to fax [phone]

## 2021-04-29 NOTE — ANESTHESIA PREPROCEDURE EVALUATION
Procedure:  ERCP    Relevant Problems   PULMONARY   (+) Asthma      Other   (+) Abdominal pain   (+) Choledocholithiasis   (+) Tobacco use      Recent labs personally reviewed:  Lab Results   Component Value Date    WBC 7 92 04/29/2021    HGB 12 6 04/29/2021     04/29/2021     Lab Results   Component Value Date    K 3 6 04/29/2021    BUN 8 04/29/2021    CREATININE 0 83 04/29/2021     No results found for: PTT   No results found for: INR    No results found for: HGBA1C    Physical Exam    Airway    Mallampati score: II  TM Distance: >3 FB  Neck ROM: full     Dental       Cardiovascular  Rhythm: regular, Rate: normal,     Pulmonary  Breath sounds clear to auscultation,     Other Findings        Anesthesia Plan  ASA Score- 3     Anesthesia Type- general with ASA Monitors  Additional Monitors:   Airway Plan: ETT  Plan Factors-Exercise tolerance (METS): >4 METS  Chart reviewed  Existing labs reviewed  Patient summary reviewed  Patient is a current smoker  Induction- intravenous  Postoperative Plan-   Planned trial extubation    Informed Consent- Anesthetic plan and risks discussed with patient  I personally reviewed this patient with the CRNA  Discussed and agreed on the Anesthesia Plan with the KARI Toscano

## 2021-04-29 NOTE — UTILIZATION REVIEW
Initial Clinical Review    Admission: Date/Time/Statement:   Admission Orders (From admission, onward)     Ordered        04/27/21 2120  Inpatient Admission  Once                   Orders Placed This Encounter   Procedures    Inpatient Admission     Standing Status:   Standing     Number of Occurrences:   1     Order Specific Question:   Level of Care     Answer:   Med Surg [16]     Order Specific Question:   Estimated length of stay     Answer:   More than 2 Midnights     Order Specific Question:   Certification     Answer:   I certify that inpatient services are medically necessary for this patient for a duration of greater than two midnights  See H&P and MD Progress Notes for additional information about the patient's course of treatment  ED Arrival Information     Expected Arrival Acuity Means of Arrival Escorted By Service Admission Type    - 4/27/2021 19:04 Emergent Walk-In Self Hospitalist Emergency    Arrival Complaint    abd pain        Chief Complaint   Patient presents with    Abdominal Pain     pt was seen here this morning for the same thing and was awaiting admission  had to leave for personal reasons and is now back for same abdominal pain  Initial Presentation: 40 y  o  female s/p cholecystectomy represents to ED after leaving AMA for family matters with abdominal pain x3 days   Patient developed acute onset intermittent abdominal pain which radiates to her back, and is associated with nausea, vomiting, poor p o  Intake   She states this feels similar to previous episode of cholecystitis  Angelina Space is s/p cholecystectomy, and required ERCP x1 post cholecystectomy for retained gallstone  She has tried Tylenol on aspirin without relief  ED Findings:T Bili 1 79,  Alk phos 277,  AST 1,068,  , UA was significant for urobilinogen  Date: 4/27 Admitted to Inpatient  MS with Abdominal pain, elevated lefts, N/V  Plan is for PPI, GI Consult, tylenol level and acute hepatitis panel   Was scheduled for MRCP today but patient left AMA  Day 2: 4/28  Started on Ancef & Flagy for Choledocholithiasis  tylenol level less than 2   GI saw Pt and recommend transfer to [facility] for ERCP due to large size of stone  She had spike in transaminases overnight   likely secondary to biliary obstruction  Other differentials include viral hepatitis, drug-induced liver injury, ischemic hepatitis, autoimmune liver disease  Continue Ancef & flagyl, monitor liver enzymes, wbc and temp   follow-up viral serologies, autoimmune serologies    Transferred to [facility] access hospital  4/28    ED Triage Vitals   Temperature Pulse Respirations Blood Pressure SpO2   04/27/21 1913 04/27/21 1913 04/27/21 1913 04/27/21 1913 04/27/21 1913   98 °F (36 7 °C) 83 16 (!) 173/92 100 %      Temp Source Heart Rate Source Patient Position - Orthostatic VS BP Location FiO2 (%)   04/27/21 1913 04/27/21 2032 04/27/21 2032 04/27/21 2032 --   Oral Monitor Lying Right arm       Pain Score       04/27/21 1913       Worst Possible Pain          Wt Readings from Last 1 Encounters:   04/27/21 64 1 kg (141 lb 5 oz)     Additional Vital Signs:   04/28/21 13:49:17  98 2 °F (36 8 °C)  63  18  134/88  103  98 %  None (Room air)  Sitting   04/28/21 1204  98 3 °F (36 8 °C)  67  17  154/79  --  99 %  None (Room air)  Sitting   04/28/21 0704  --  60  16  122/70  --  100 %  None (Room air)  --   04/28/21 0535  --  61  16  121/73  --  100 %  None (Room air)  Lying   04/27/21 2335  98 °F (36 7 °C)  63  16  134/85  --  100 %  None (Room air)  Lying   04/27/21 2230  --  70  16  140/77  100  100 %  None (Room air)  Lying   04/27/21 2130  --  84  18  131/79  97  98 %  None (Room air)  Sitting   04/27/21 2032  --  77  16  125/80  --  99 %  None (Room air)  Lying       Pertinent Labs/Diagnostic Test Results:     4/27 MRI ABD: Choledocholithiasis with  Intrahepatic and extrahepatic biliary ductal dilation   The abnormality noted on the CT scan corresponds to a 2 0 x 1 3 cm CBD stone     Results from last 7 days   Lab Units 04/29/21 0447 04/28/21  0534 04/27/21 2007 04/27/21  0501 04/26/21 2010   WBC Thousand/uL 7 92 7 04 7 48 7 37 10 39*   HEMOGLOBIN g/dL 12 6 12 2 14 1 13 0 14 8   HEMATOCRIT % 38 0 36 8 41 6 39 0 43 2   PLATELETS Thousands/uL 283 269 319 292 358   NEUTROS ABS Thousands/µL  --  3 40 4 93 3 65 6 68         Results from last 7 days   Lab Units 04/29/21 0447 04/28/21  0534 04/27/21 2007 04/27/21  0501 04/26/21 2010   SODIUM mmol/L 141 143 140 142 141   POTASSIUM mmol/L 3 6 3 9 3 5 4 2 3 7   CHLORIDE mmol/L 110* 110* 102 107 101   CO2 mmol/L 28 26 27 26 30   ANION GAP mmol/L 3* 7 11 9 10   BUN mg/dL 8 10 12 13 13   CREATININE mg/dL 0 83 0 78 0 96 0 87 0 99   EGFR ml/min/1 73sq m 85 92 72 81 69   CALCIUM mg/dL 8 7 8 1* 9 1 8 6 9 3     Results from last 7 days   Lab Units 04/29/21 0447 04/28/21  0534 04/27/21 2007 04/27/21  0501 04/26/21 2010   AST U/L 138* 400* 1,068* 266* 551*   ALT U/L 381* 565* 705* 385* 406*   ALK PHOS U/L 180* 216* 277* 197* 236*   TOTAL PROTEIN g/dL 6 7 6 4 7 9 6 8 8 1   ALBUMIN g/dL 3 1* 2 9* 3 7 2 9* 3 8   TOTAL BILIRUBIN mg/dL 0 52 0 35 1 79* 0 39 0 97   BILIRUBIN DIRECT mg/dL  --   --  1 40*  --  0 63*     Results from last 7 days   Lab Units 04/29/21 0447 04/28/21  0534 04/27/21 2007 04/27/21  0501 04/26/21 2010   GLUCOSE RANDOM mg/dL 87 88 111 95 124     Results from last 7 days   Lab Units 04/28/21  1015   FERRITIN ng/mL 94     Results from last 7 days   Lab Units 04/28/21  1015   HEP B S AG  Non-reactive   HEP C AB  Non-reactive   HEP B C IGM  Non-reactive     Results from last 7 days   Lab Units 04/27/21 2007 04/26/21 2010   LIPASE u/L 83 120     Results from last 7 days   Lab Units 04/26/21 1953   CLARITY UA  Clear   COLOR UA  Yellow   SPEC GRAV UA  >=1 030   PH UA  6 5   GLUCOSE UA mg/dl Negative   KETONES UA mg/dl Negative   BLOOD UA  Trace*   PROTEIN UA mg/dl 30 (1+)*   NITRITE UA  Negative   BILIRUBIN UA  Interference- unable to analyze*   UROBILINOGEN UA E U /dl 2 0*   LEUKOCYTES UA  Negative   WBC UA /hpf 0-1*   RBC UA /hpf 4-10*   BACTERIA UA /hpf Occasional   EPITHELIAL CELLS WET PREP /hpf Occasional     Results from last 7 days   Lab Units 04/28/21  1015   ACETAMINOPHEN LVL ug/mL <2*     ED Treatment:   Medication Administration from 04/27/2021 1904 to 04/28/2021 1346       Date/Time Order Dose Route Action     04/27/2021 2008 sodium chloride 0 9 % bolus 1,000 mL 1,000 mL Intravenous New Bag     04/27/2021 2005 ondansetron (ZOFRAN) injection 4 mg 4 mg Intravenous Given     04/27/2021 2004 morphine (PF) 4 mg/mL injection 4 mg 4 mg Intravenous Given     04/28/2021 0010 sodium chloride 0 9 % infusion 75 mL/hr Intravenous New Bag     04/28/2021 1020 pantoprazole (PROTONIX) injection 40 mg 40 mg Intravenous Given     04/27/2021 2149 pantoprazole (PROTONIX) injection 40 mg 40 mg Intravenous Given     04/28/2021 0634 morphine injection 2 mg 2 mg Intravenous Given     04/28/2021 0008 morphine injection 2 mg 2 mg Intravenous Given     04/28/2021 1319 ceFAZolin (ANCEF) IVPB (premix in dextrose) 2,000 mg 50 mL 2,000 mg Intravenous New Bag        Past Medical History:   Diagnosis Date    Asthma     Gallstone      Present on Admission:   Abdominal pain   Elevated LFTs   Nausea & vomiting      Admitting Diagnosis: Hyperbilirubinemia [E80 6]  Epigastric abdominal pain [R10 13]  Abdominal pain [R10 9]  Elevated LFTs [R79 89]  Abdominal pain, unspecified abdominal location [R10 9]  Nausea and vomiting, intractability of vomiting not specified, unspecified vomiting type [R11 2]  Age/Sex: 40 y o  female     Admission Orders:  Scheduled Medications:  ceFAZolin (ANCEF) IVPB (premix in dextrose) 2,000 mg 50 mL   IV q8h  metroNIDAZOLE (FLAGYL) IVPB (premix) 500 mg 100 mL   IV  q8h  pantoprazole (PROTONIX) injection 40 mg   IV  q12h    sodium chloride 0 9 % infusion @  75 / hr    morphine injection 2 mg  IV q4h prn  X 3 4/28  ondansetron (Teresa Fraser) injection 4 mg   IV q6h  prn    IP CONSULT TO GASTROENTEROLOGY    Network Utilization Review Department  ATTENTION: Please call with any questions or concerns to [phone] and carefully listen to the prompts so that you are directed to the right person  All voicemails are confidential   Osiris Khan all requests for admission clinical reviews, approved or denied determinations and any other requests to dedicated fax number below belonging to the campus where the patient is receiving treatment   List of dedicated fax numbers for the Facilities:  [address] DENIALS (Administrative/Medical Necessity) [phone]   [address] (Maternity/NICU/Pediatrics) [phone]   [address] Kevin Munguiael Anup [phone] [address] [place] [id number] [address] Daphne Guerra 1481 [address] [place] [address] [phone]

## 2021-04-29 NOTE — CASE MANAGEMENT
The patient transferred from [facility] for ERCP  She had recently signed AMA from the ED at [facility]  Patient is off unit at ERCP  Met with her SO to complete assessment  The patient lives with her SO and daughter  They live in a second floor apartment with 3-4 FRANCIS and 10 steps with left rails  The patient is independent with no DME or HHC and no MH and D&A treatment  Patient and SO drive and he will transport at dc  They are both currently unemployed  Patient has no advance directive  PCP is Jenn Cannon  Patient has prescription coverage and uses [address]  CM reviewed d/c planning process including the following: identifying help at home, patient preference for d/c planning needs, Discharge Lounge, [facility] Meds to Bed program, availability of treatment team to discuss questions or concerns patient and/or family may have regarding understanding medications and recognizing signs and symptoms once discharged  CM also encouraged patient to follow up with all recommended appointments after discharge  Patient advised of importance for patient and family to participate in managing patients medical well being

## 2021-04-29 NOTE — H&P
[address] [facility]  H&P- Wilda Faustin 1975, 40 y o  female MRN: [id number]  Unit/Bed#: [facility] 303-01 Encounter: [id number]  Primary Care Provider: Latesha Cobos DO   Date and time admitted to hospital: 4/28/2021  9:09 PM    * Choledocholithiasis  Assessment & Plan  S/p MRCP  Etiology of abd pain and transaminitis  Given size of stone recommended per GI for transfer to \[facility]\"" for ERCP  Pt empirically started on abx with ancef/flagyl  No sign of acute infxn process  Cont supportive care  NPO after midnight  Recurring hx of cholelithiasis s/p cholecystectomy  Educated on lifestyle dietary modification    Elevated LFTs  Assessment & Plan  Secondary to choledocholithiasis  Status post MRCP, plan for ERCP  Normal acetaminophen level  Nonreactive hepatitis panel  Continue to trend    Asthma  Assessment & Plan  No sign of exacerbation    Tobacco use  Assessment & Plan  Educated on cessation  Will place on nicotine patch      VTE Prophylaxis: Low risk, ambulates  / sequential compression device   Code Status:  Full code  POLST: There is no POLST form on file for this patient (pre-hospital)  Discussion with family:  Discussed with patient and her     Anticipated Length of Stay:  Patient will be admitted on an Inpatient basis with an anticipated length of stay of  > 2 midnights  Justification for Hospital Stay:  ERCP    Total Time for Visit, including Counseling / Coordination of Care: 60 minutes  Greater than 50% of this total time spent on direct patient counseling and coordination of care  Chief Complaint:   Transfer from [facility] secondary to choledocholithiasis  History of Present Illness:    Wilda Faustin is a 40 y o  female medical history significant for mild asthma, cholelithiasis status post cholecystectomy  She had presented to [facility] secondary to 6 days history of abdominal pain, nausea vomiting    She reported of abdominal pain in the epigastric and right upper quadrant  Upon presentation noted was remarkable transaminitis  Proceeded with MRCP revealing choledocholithiasis  Given size of stone (2 x 1 3 cm), GI there recommended transfer to [facility] for ERCP  Review of Systems:    Review of Systems   Constitutional: Negative  HENT: Negative  Eyes: Negative  Respiratory: Negative  Gastrointestinal: Positive for abdominal pain and nausea  Endocrine: Negative  Genitourinary: Negative  Musculoskeletal: Negative  Allergic/Immunologic: Negative  Neurological: Negative  Hematological: Negative  Psychiatric/Behavioral: Negative  Past Medical and Surgical History:     Past Medical History:   Diagnosis Date    Asthma     Gallstone      Past surgical history  Laparoscopic cholecystectomy  Meds/Allergies:    Prior to Admission medications    Not on File     I have reviewed home medications using allscripts  Allergies: No Known Allergies    Social History:     Marital Status:    Occupation:   Patient Pre-hospital Living Situation:  Resides with family  Patient Pre-hospital Level of Mobility:  Independent  Patient Pre-hospital Diet Restrictions:  None  Substance Use History:   Social History     Substance and Sexual Activity   Alcohol Use Not Currently    Frequency: Never    Binge frequency: Never     Social History     Tobacco Use   Smoking Status Current Every Day Smoker    Packs/day: 1 00   Smokeless Tobacco Current User     Social History     Substance and Sexual Activity   Drug Use Not Currently       Family History:    non-contributory    Physical Exam:     Vitals:   Blood Pressure: 128/73 (04/28/21 2124)  Pulse: 90 (04/28/21 2124)  Temperature: 97 8 °F (36 6 °C) (04/28/21 2124)  Temp Source: Oral (04/28/21 2124)  Respirations: 18 (04/28/21 2124)  SpO2: 97 % (04/28/21 2124)    Physical Exam  Constitutional:       Appearance: Normal appearance     Neck:      Musculoskeletal: Normal range of motion and neck supple  Cardiovascular:      Rate and Rhythm: Normal rate and regular rhythm  Pulses: Normal pulses  Heart sounds: Normal heart sounds  Pulmonary:      Effort: Pulmonary effort is normal  No respiratory distress  Breath sounds: Normal breath sounds  No wheezing or rales  Abdominal:      General: Abdomen is flat  Bowel sounds are normal  There is no distension  Tenderness: There is abdominal tenderness  There is no guarding  Comments: In epigastric, RUQ region   Musculoskeletal: Normal range of motion  Right lower leg: No edema  Left lower leg: No edema  Skin:     General: Skin is warm and dry  Coloration: Skin is not jaundiced  Neurological:      General: No focal deficit present  Mental Status: She is alert and oriented to person, place, and time  Mental status is at baseline  Cranial Nerves: No cranial nerve deficit  Motor: No weakness  Additional Data:     Lab Results: I have personally reviewed pertinent reports  Results from last 7 days   Lab Units 04/28/21  0534   WBC Thousand/uL 7 04   HEMOGLOBIN g/dL 12 2   HEMATOCRIT % 36 8   PLATELETS Thousands/uL 269   NEUTROS PCT % 50   LYMPHS PCT % 38   MONOS PCT % 8   EOS PCT % 4     Results from last 7 days   Lab Units 04/28/21  0534   SODIUM mmol/L 143   POTASSIUM mmol/L 3 9   CHLORIDE mmol/L 110*   CO2 mmol/L 26   BUN mg/dL 10   CREATININE mg/dL 0 78   ANION GAP mmol/L 7   CALCIUM mg/dL 8 1*   ALBUMIN g/dL 2 9*   TOTAL BILIRUBIN mg/dL 0 35   ALK PHOS U/L 216*   ALT U/L 565*   AST U/L 400*   GLUCOSE RANDOM mg/dL 88                       Imaging: I have personally reviewed pertinent reports  No orders to display       EKG, Pathology, and Other Studies Reviewed on Admission:   · EKG:     [facility] Records Reviewed: Yes     ** Please Note: This note has been constructed using a voice recognition system   **

## 2021-04-29 NOTE — ASSESSMENT & PLAN NOTE
Secondary to choledocholithiasis  Status post MRCP, plan for ERCP  Normal acetaminophen level  Nonreactive hepatitis panel  Continue to trend; trending down

## 2021-04-29 NOTE — PROGRESS NOTES
Progress Note- Raul Brar 40 y o  female MRN: [id number]    Unit/Bed#: [facility] 303-01 Encounter: [id number]      Assessment and Plan:    67-year-old with history of cholecystectomy, choledocholithiasis status post ERCP in 2015 for removal of stone  Patient presented to [facility] with abdominal pain with nausea and vomiting  Imaging showed CBD 18 mm with 2 cm stone in the distal CBD  Patient transferred to [place] further care  1  Choledocholithiasis  Patient has large stone in the CBD  WBC elevated at admission possible hemoconcentration currently CBC normal   LFTs improved with total bili now normal and improvement in ALT and alkaline phosphatase  No pancreatitis  Patient's symptoms controlled with opioids currently  She will need ERCP with possible EHL  Will plan for procedure today  2  Colon cancer screening  No colonoscopy reported in the past   Will discuss with patient to get it as outpatient  Jamaal Alejandro MD  Gastroenterology Fellow  [address]  Date: April 29, 2021    ______________________________________________________________________    Subjective:     67-year-old with history of cholecystectomy, choledocholithiasis status post ERCP in 2015 for removal of stone  Patient presented to [facility] with abdominal pain with nausea and vomiting  Imaging showed CBD 18 mm with 2 cm stone in the distal CBD  Patient transferred to [facility] care  Patient reports that abdominal pain controlled with opioids  She has nausea when she has abdominal pain      Medication Administration - last 24 hours from 04/28/2021 0938 to 04/29/2021 4816       Date/Time Order Dose Route Action Action by     04/29/2021 0554 ondansetron (ZOFRAN) injection 4 mg 4 mg Intravenous Given [id number] Jose De La Fuente RN     04/28/2021 2350 ondansetron [facility]) injection 4 mg 4 mg Intravenous Given Romana Punter, RN     04/29/2021 0500 ceFAZolin (ANCEF) IVPB (premix in dextrose) 2,000 mg 50 mL 2,000 mg Intravenous [address] [facility], [address] [place] [address]     04/28/2021 2204 ceFAZolin (ANCEF) IVPB (premix in dextrose) 2,000 mg 50 mL 2,000 mg Intravenous New Bag Ana M Deemer     04/29/2021 0819 metroNIDAZOLE (FLAGYL) IVPB (premix) 500 mg 100 mL 500 mg Intravenous [address] Critical access hospital, [address] [place] [address]     04/29/2021 0105 metroNIDAZOLE (FLAGYL) IVPB (premix) 500 mg 100 mL 500 mg Intravenous New Bag Latha Lester RN     04/29/2021 0551 morphine injection 2 mg 2 mg Intravenous Given Kassy Areola [place], RN     04/28/2021 2346 morphine injection 2 mg 2 mg Intravenous Given Latha BAM Lester     04/28/2021 2209 oxyCODONE (ROXICODONE) IR tablet 5 mg 5 mg Oral Given Ana M Deemer     04/29/2021 0820 pantoprazole (PROTONIX) injection 40 mg 40 mg Intravenous Given Ricardo Yanes RN     04/28/2021 2208 pantoprazole (PROTONIX) injection 40 mg 40 mg Intravenous Given Aan M Deemer     04/28/2021 2203 sodium chloride 0 9 % infusion 75 mL/hr Intravenous New Bag Ana M Deemer     04/29/2021 0815 nicotine (NICODERM CQ) 14 mg/24hr TD 24 hr patch 14 mg 14 mg Transdermal Not Given Ricardo Yanes RN          Objective:     Vitals: Blood pressure 120/72, pulse 64, temperature 98 °F (36 7 °C), temperature source Oral, resp  rate 12, SpO2 97 %  ,There is no height or weight on file to calculate BMI  No intake or output data in the 24 hours ending 04/29/21 0938    Physical Exam:   General Appearance:   Alert, cooperative, no distress   HEENT:   Normocephalic, atraumatic, anicteric  Neck:  Supple, symmetrical, trachea midline   Lungs:   Clear to auscultation bilaterally; no rales, rhonchi or wheezing; respirations unlabored    Heart[de-identified]   Regular rate and rhythm; no murmur, rub, or gallop     Abdomen:   Soft, non-tender, non-distended; normal bowel sounds; no masses, no organomegaly    Genitalia:   Deferred    Rectal:   Deferred    Extremities:  No cyanosis, clubbing or edema Pulses:  2+ and symmetric all extremities    Skin:  No jaundice, rashes, or lesions    Lymph nodes:  No palpable cervical lymphadenopathy          Invasive Devices     Peripheral Intravenous Line            Peripheral IV 04/28/21 Right Hand less than 1 day                Lab Results:  Admission on 04/28/2021   Component Date Value    WBC 04/29/2021 7 92     RBC 04/29/2021 3 76*    Hemoglobin 04/29/2021 12 6     Hematocrit 04/29/2021 38 0     MCV 04/29/2021 101*    MCH 04/29/2021 33 5     MCHC 04/29/2021 33 2     RDW 04/29/2021 13 2     Platelets 99/43/1092 283     MPV 04/29/2021 10 1     Sodium 04/29/2021 141     Potassium 04/29/2021 3 6     Chloride 04/29/2021 110*    CO2 04/29/2021 28     ANION GAP 04/29/2021 3*    BUN 04/29/2021 8     Creatinine 04/29/2021 0 83     Glucose 04/29/2021 87     Calcium 04/29/2021 8 7     Corrected Calcium 04/29/2021 9 4     AST 04/29/2021 138*    ALT 04/29/2021 381*    Alkaline Phosphatase 04/29/2021 180*    Total Protein 04/29/2021 6 7     Albumin 04/29/2021 3 1*    Total Bilirubin 04/29/2021 0 52     eGFR 04/29/2021 85        Imaging Studies: I have personally reviewed pertinent imaging studies

## 2021-04-29 NOTE — UTILIZATION REVIEW
Initial Clinical Review    4/28 Transfer from [address] [place] [address]  Pt at [place] from 4/27 to 4/28    Admission: Date/Time/Statement:   Admission Orders (From admission, onward)     Ordered        04/28/21 2130  Inpatient Admission  Once                   Orders Placed This Encounter   Procedures    Inpatient Admission     Standing Status:   Standing     Number of Occurrences:   1     Order Specific Question:   Level of Care     Answer:   Med Surg [16]     Order Specific Question:   Estimated length of stay     Answer:   More than 2 Midnights     Order Specific Question:   Certification     Answer:   I certify that inpatient services are medically necessary for this patient for a duration of greater than two midnights  See H&P and MD Progress Notes for additional information about the patient's course of treatment  ED Arrival Information     Patient not seen in ED                     Initial Presentation:   46y Female, transfer from [facility] med surg unit to [facility] med surg unit presents for abdominal pain, nausea and vomiting x6 days  Abdominal pain in the epigastric, RUQ and Transaminitis  MRCP revealing choledocholithiasis  Given size of stone (2 x 1 3 cm), GI there recommended transfer to [facility] for ERCP  PMH for Asthma and Tobacco use  Admit Inpatient level of care for Choledocholithiasis and Elevated LFTs  S/p MRCP  Etiology of abd pain and transaminitis  Continue Iv antibiotics  NPO after midnight  Recurring hx of cholelithiasis s/p cholecystectomy  Plan for ERCP  Continue to trend  Date: 4/29   Day 2:   Per Gastroenterology; Choledocholithiasis  Imaging showed CBD 18 mm with 2 cm stone in the distal CBD  LFTs improved with total bili now normal and improvement in ALT and alkaline phosphatase  Symptoms controlled with opioids currently  Plan for ERCP today  S/p ERCP - cholangioscopy for large common bile duct stone   Underwent electrohydraulic lithotripsy with sphincteroplasty in the majority of stone fragments were removed  Checked STAT labs for concern for possible post-ERCP pancreatitis  Overall, LFTs appeared to be improving  Borderline leukocytosis which is likely secondary to inflammatory reaction  Low suspicion for infection  Unclear if pain is secondary to stone fragments that may remain within the CBD versus early post-ERCP pancreatitis?     Vitals [04/28/21 2124]   Temperature Pulse Respirations Blood Pressure SpO2   97 8 °F (36 6 °C) 90 18 128/73 97 %      Temp Source Heart Rate Source Patient Position - Orthostatic VS BP Location FiO2 (%)   Oral -- Lying Right arm --      Pain Score       5          Wt Readings from Last 1 Encounters:   04/27/21 64 1 kg (141 lb 5 oz)     Additional Vital Signs:   04/29/21 0729  98 °F (36 7 °C)  64  12  120/72  88  97 %  None (Room air)  Lying   04/29/21 0551  --  78  --  141/84  --  100 %  None (Room air)  --   04/28/21 2346  98 6 °F (37 °C)  59  18  132/86  103  98 %  None (Room air)  Lying   04/28/21 2200  --  --  --  --  --  --  None (Room air)       Pertinent Labs/Diagnostic Test Results:   4/28 MRI Abd - Choledocholithiasis with  Intrahepatic and extrahepatic biliary ductal dilation  The abnormality noted on the CT scan corresponds to a 2 0 x 1 3 cm CBD stone       Lab Units 04/29/21 0447 04/28/21  0534   WBC Thousand/uL 7 92 7 04   HEMOGLOBIN g/dL 12 6 12 2   HEMATOCRIT % 38 0 36 8   PLATELETS Thousands/uL 283 269   NEUTROS ABS Thousands/µL  --  3 40         Lab Units 04/29/21 0447 04/28/21  0534   SODIUM mmol/L 141 143   POTASSIUM mmol/L 3 6 3 9   CHLORIDE mmol/L 110* 110*   CO2 mmol/L 28 26   ANION GAP mmol/L 3* 7   BUN mg/dL 8 10   CREATININE mg/dL 0 83 0 78   EGFR ml/min/1 73sq m 85 92   CALCIUM mg/dL 8 7 8 1*     Lab Units 04/29/21 0447 04/28/21  0534   AST U/L 138* 400*   ALT U/L 381* 565*   ALK PHOS U/L 180* 216*   TOTAL PROTEIN g/dL 6 7 6 4   ALBUMIN g/dL 3 1* 2 9*   TOTAL BILIRUBIN mg/dL 0 52 0 35 BILIRUBIN DIRECT mg/dL  --   --          Lab Units 04/29/21  0447 04/28/21  0534   GLUCOSE RANDOM mg/dL 87 88       Results from last 7 days   Lab Units 04/28/21  1015   FERRITIN ng/mL 94     Results from last 7 days   Lab Units 04/28/21  1015   HEP B S AG  Non-reactive   HEP C AB  Non-reactive   HEP B C IGM  Non-reactive       Results from last 7 days   Lab Units 04/28/21  1015   ACETAMINOPHEN LVL ug/mL <2*       Past Medical History:   Diagnosis Date    Asthma     Gallstone      Present on Admission:   Elevated LFTs      Admitting Diagnosis: Common bile duct stone [K80 50]  Age/Sex: 40 y o  female     Admission Orders:  Scheduled Medications:  cefazolin, 2,000 mg, Intravenous, Q8H  metroNIDAZOLE, 500 mg, Intravenous, Q8H  nicotine, 14 mg, Transdermal, Daily  pantoprazole, 40 mg, Intravenous, Q12H [facility]      Continuous IV Infusions:  sodium chloride, 75 mL/hr, Intravenous, Continuous      PRN Meds:  aluminum-magnesium hydroxide-simethicone, 30 mL, Oral, Q6H PRN  morphine injection, 2 mg, Intravenous, Q4H PRN 4/28 x1, 4/29 x1  ondansetron, 4 mg, Intravenous, Q6H PRN 4/28 x1, 4/29 x1  oxyCODONE, 5 mg, Oral, Q4H PRN 4/28 x1      IP CONSULT TO GASTROENTEROLOGY    Network Utilization Review Department  ATTENTION: Please call with any questions or concerns to [phone] and carefully listen to the prompts so that you are directed to the right person  All voicemails are confidential   Divya Martinez all requests for admission clinical reviews, approved or denied determinations and any other requests to dedicated fax number below belonging to the campus where the patient is receiving treatment   List of dedicated fax numbers for the Facilities:  [address] DENIALS (Administrative/Medical Necessity) [phone]   [address] (Maternity/NICU/Pediatrics) [phone] [address] [phone] [address] [phone] 5000 [facility] Charlie Power [place] [phone]   [facility] [id number] [address] Todd Guerra [facility] [address] [place] [address] [phone]

## 2021-04-29 NOTE — ASSESSMENT & PLAN NOTE
Secondary to choledocholithiasis  Status post MRCP, plan for ERCP  Normal acetaminophen level  Nonreactive hepatitis panel  Continue to trend

## 2021-04-29 NOTE — PROGRESS NOTES
[facility] [facility]  Progress Note - Vandana Pain 1975, 40 y o  female MRN: [id number]  Unit/Bed#: [facility] 303-01 Encounter: [id number]  Primary Care Provider: Aaron Ramirez DO   Date and time admitted to hospital: 4/28/2021  9:09 PM    Asthma  Assessment & Plan  No sign of exacerbation    Tobacco use  Assessment & Plan  Educated on cessation  Will place on nicotine patch    Elevated LFTs  Assessment & Plan  Secondary to choledocholithiasis  Status post MRCP, plan for ERCP  Normal acetaminophen level  Nonreactive hepatitis panel  Continue to trend; trending down    * Choledocholithiasis  Assessment & Plan  S/p MRCP  Etiology of abd pain and transaminitis  Given size of stone recommended per GI for transfer to [facility] for ERCP  Pt empirically started on abx with ancef/flagyl  No sign of acute infxn process  Cont supportive care   to undergo ERCP today; await further GI recommendations  Recurring hx of cholelithiasis s/p cholecystectomy  Educated on lifestyle dietary modification   LFTs improving on morning labs; continue to monitor      VTE Pharmacologic Prophylaxis:   Pharmacologic: Pharmacologic VTE Prophylaxis contraindicated due to Low risk  Mechanical VTE Prophylaxis in Place: Yes    Patient Centered Rounds: I have performed bedside rounds with nursing staff today  Discussions with Specialists or Other Care Team Provider:  Gastroenterology    Education and Discussions with Family / Patient: Discussed treatment plan with family and patient who agree with current plan; encouraged to ask questions and participate  Time Spent for Care: 30 minutes  More than 50% of total time spent on counseling and coordination of care as described above  Current Length of Stay: 1 day(s)    Current Patient Status: Inpatient   Certification Statement: The patient will continue to require additional inpatient hospital stay due to Choledocholithiasis    Discharge Plan:   To be determined, likely tomorrow    Code Status: Level 1 - Full Code      Subjective:   Patient seen examined bedside, underwent ERCP with stone removal today  Stent placed and patient being treatment for pain and nausea  Had several complaints regarding the speed at which IV medications were administered, not frequency, but actual speed at which medications were infused  Requesting another nurse  otherwise reports that her appetite is appropriate  As per GI, will start on clear liquid diet  Continue antibiotics  Patient likely stable for discharge tomorrow  Objective:     Vitals:   Temp (24hrs), Av 6 °F (36 4 °C), Min:96 9 °F (36 1 °C), Max:98 6 °F (37 °C)    Temp:  [96 9 °F (36 1 °C)-98 6 °F (37 °C)] 96 9 °F (36 1 °C)  HR:  [] 55  Resp:  [12-22] 18  BP: (120-184)/() 160/88  SpO2:  [97 %-100 %] 100 %  Body mass index is 25 79 kg/m²  Input and Output Summary (last 24 hours): Intake/Output Summary (Last 24 hours) at 2021 1735  Last data filed at 2021 1517  Gross per 24 hour   Intake 1000 ml   Output --   Net 1000 ml       Physical Exam:     Physical Exam  Vitals signs and nursing note reviewed  Constitutional:       General: She is not in acute distress  Appearance: She is well-developed  HENT:      Head: Normocephalic and atraumatic  Eyes:      Conjunctiva/sclera: Conjunctivae normal    Neck:      Musculoskeletal: Neck supple  Cardiovascular:      Rate and Rhythm: Normal rate and regular rhythm  Heart sounds: No murmur  Pulmonary:      Effort: Pulmonary effort is normal  No respiratory distress  Breath sounds: Normal breath sounds  Abdominal:      Palpations: Abdomen is soft  Tenderness: There is no abdominal tenderness  Skin:     General: Skin is warm and dry  Neurological:      Mental Status: She is alert     Psychiatric:      Comments: Agitated           Additional Data:     Labs:    Results from last 7 days   Lab Units 21  0447 21  0534   WBC Thousand/uL 7 92 7 04   HEMOGLOBIN g/dL 12 6 12 2   HEMATOCRIT % 38 0 36 8   PLATELETS Thousands/uL 283 269   NEUTROS PCT %  --  50   LYMPHS PCT %  --  38   MONOS PCT %  --  8   EOS PCT %  --  4     Results from last 7 days   Lab Units 04/29/21  0447   SODIUM mmol/L 141   POTASSIUM mmol/L 3 6   CHLORIDE mmol/L 110*   CO2 mmol/L 28   BUN mg/dL 8   CREATININE mg/dL 0 83   ANION GAP mmol/L 3*   CALCIUM mg/dL 8 7   ALBUMIN g/dL 3 1*   TOTAL BILIRUBIN mg/dL 0 52   ALK PHOS U/L 180*   ALT U/L 381*   AST U/L 138*   GLUCOSE RANDOM mg/dL 87                           * I Have Reviewed All Lab Data Listed Above  * Additional Pertinent Lab Tests Reviewed:  All Labs Within Last 24 Hours Reviewed    Imaging:    Imaging Reports Reviewed Today Include:  ERCP  Imaging Personally Reviewed by Myself Includes:      Recent Cultures (last 7 days):           Last 24 Hours Medication List:   Current Facility-Administered Medications   Medication Dose Route Frequency Provider Last Rate    albuterol  2 5 mg Nebulization Once PRN Sherri Miles MD      aluminum-magnesium hydroxide-simethicone  30 mL Oral Q6H PRN Kody Letters, DO      cefazolin  2,000 mg Intravenous Q8H Kody Letters, DO 2,000 mg (04/29/21 1650)    lactated ringers  125 mL/hr Intravenous Continuous Sherri Miles  mL/hr (04/29/21 1643)    lactated ringers  20 mL/hr Intravenous Continuous Sherri Miles MD Stopped (04/29/21 1637)    metroNIDAZOLE  500 mg Intravenous Q8H Kody Letters,  mg (04/29/21 1643)    morphine injection  2 mg Intravenous Q4H PRN Kody Letters, DO      nicotine  14 mg Transdermal Daily Kody Letters, DO      ondansetron  4 mg Intravenous Q6H PRN Kody Letters, DO      oxyCODONE  5 mg Oral Q4H PRN Kody Letters, DO      pantoprazole  40 mg Intravenous Q12H [address] Kody Letters, DO          Today, Patient Was Seen By: Elysia Woodall MD    ** Please Note: Dictation voice to text software may have been used in the creation of this document   **

## 2021-04-29 NOTE — UTILIZATION REVIEW
Inpatient Admission Authorization Request   NOTIFICATION OF INPATIENT ADMISSION/INPATIENT AUTHORIZATION REQUEST   SERVICING FACILITY:   [facility]  Address: [address] [place] [address], [address] [place] [address]  Tax ID: [id number]  NPI: [id number]  Place of Service: Inpatient [address] [facility] Code: 24     ATTENDING PROVIDER:  Attending Name and NPI#: Sushma Gray Md [[id number]]  Address: [address] [place] [address], [address] [place] [address]  Phone: [phone]     UTILIZATION REVIEW CONTACT:  [facility] Utilization   Network Utilization Review Department  Phone: [phone]  Fax: [phone]  Email: [email or web address]     PHYSICIAN ADVISORY SERVICES:  FOR [id number] REVIEW - MEDICAL NECESSITY DENIAL  Phone: [phone]  Fax: [phone]  Email: [email or web address]     TYPE OF REQUEST:  Inpatient Status     ADMISSION INFORMATION:  ADMISSION DATE/TIME: 4/28/21 2109  PATIENT DIAGNOSIS CODE/DESCRIPTION:  Common bile duct stone [K80 50]  DISCHARGE DATE/TIME: No discharge date for patient encounter  DISCHARGE DISPOSITION (IF DISCHARGED): [address] [facility]     IMPORTANT INFORMATION:  Please contact the [facility] directly with any questions or concerns regarding this request  Department voicemails are confidential     Send requests for admission clinical reviews, concurrent reviews, approvals, and administrative denials due to lack of clinical to fax [phone]

## 2021-04-29 NOTE — ASSESSMENT & PLAN NOTE
S/p MRCP  Etiology of abd pain and transaminitis  Given size of stone recommended per GI for transfer to [facility] for ERCP  Pt empirically started on abx with ancef/flagyl  No sign of acute infxn process  Cont supportive care  NPO after midnight  Recurring hx of cholelithiasis s/p cholecystectomy    Educated on lifestyle dietary modification

## 2021-04-30 VITALS
DIASTOLIC BLOOD PRESSURE: 74 MMHG | TEMPERATURE: 97.7 F | HEIGHT: 62 IN | SYSTOLIC BLOOD PRESSURE: 137 MMHG | RESPIRATION RATE: 17 BRPM | OXYGEN SATURATION: 98 % | WEIGHT: 141 LBS | BODY MASS INDEX: 25.95 KG/M2 | HEART RATE: 56 BPM

## 2021-04-30 LAB
ALBUMIN SERPL BCP-MCNC: 2.7 G/DL (ref 3.5–5)
ALP SERPL-CCNC: 137 U/L (ref 46–116)
ALT SERPL W P-5'-P-CCNC: 199 U/L (ref 12–78)
ANION GAP SERPL CALCULATED.3IONS-SCNC: 8 MMOL/L (ref 4–13)
AST SERPL W P-5'-P-CCNC: 91 U/L (ref 5–45)
BASOPHILS # BLD AUTO: 0.01 THOUSANDS/ΜL (ref 0–0.1)
BASOPHILS NFR BLD AUTO: 0 % (ref 0–1)
BILIRUB SERPL-MCNC: 0.47 MG/DL (ref 0.2–1)
BUN SERPL-MCNC: 6 MG/DL (ref 5–25)
CALCIUM ALBUM COR SERPL-MCNC: 9.2 MG/DL (ref 8.3–10.1)
CALCIUM SERPL-MCNC: 8.2 MG/DL (ref 8.3–10.1)
CHLORIDE SERPL-SCNC: 112 MMOL/L (ref 100–108)
CO2 SERPL-SCNC: 22 MMOL/L (ref 21–32)
CREAT SERPL-MCNC: 0.66 MG/DL (ref 0.6–1.3)
EOSINOPHIL # BLD AUTO: 0 THOUSAND/ΜL (ref 0–0.61)
EOSINOPHIL NFR BLD AUTO: 0 % (ref 0–6)
ERYTHROCYTE [DISTWIDTH] IN BLOOD BY AUTOMATED COUNT: 13 % (ref 11.6–15.1)
GFR SERPL CREATININE-BSD FRML MDRD: 107 ML/MIN/1.73SQ M
GLUCOSE SERPL-MCNC: 105 MG/DL (ref 65–140)
HCT VFR BLD AUTO: 33.7 % (ref 34.8–46.1)
HGB BLD-MCNC: 11.2 G/DL (ref 11.5–15.4)
IMM GRANULOCYTES # BLD AUTO: 0.05 THOUSAND/UL (ref 0–0.2)
IMM GRANULOCYTES NFR BLD AUTO: 0 % (ref 0–2)
LYMPHOCYTES # BLD AUTO: 1.25 THOUSANDS/ΜL (ref 0.6–4.47)
LYMPHOCYTES NFR BLD AUTO: 10 % (ref 14–44)
MAGNESIUM SERPL-MCNC: 1.8 MG/DL (ref 1.6–2.6)
MCH RBC QN AUTO: 33.2 PG (ref 26.8–34.3)
MCHC RBC AUTO-ENTMCNC: 33.2 G/DL (ref 31.4–37.4)
MCV RBC AUTO: 100 FL (ref 82–98)
MONOCYTES # BLD AUTO: 0.83 THOUSAND/ΜL (ref 0.17–1.22)
MONOCYTES NFR BLD AUTO: 7 % (ref 4–12)
NEUTROPHILS # BLD AUTO: 10.46 THOUSANDS/ΜL (ref 1.85–7.62)
NEUTS SEG NFR BLD AUTO: 83 % (ref 43–75)
NRBC BLD AUTO-RTO: 0 /100 WBCS
PHOSPHATE SERPL-MCNC: 2.3 MG/DL (ref 2.7–4.5)
PLATELET # BLD AUTO: 258 THOUSANDS/UL (ref 149–390)
PMV BLD AUTO: 10.2 FL (ref 8.9–12.7)
POTASSIUM SERPL-SCNC: 4 MMOL/L (ref 3.5–5.3)
PROT SERPL-MCNC: 5.7 G/DL (ref 6.4–8.2)
RBC # BLD AUTO: 3.37 MILLION/UL (ref 3.81–5.12)
RYE IGE QN: NEGATIVE
SODIUM SERPL-SCNC: 142 MMOL/L (ref 136–145)
WBC # BLD AUTO: 12.6 THOUSAND/UL (ref 4.31–10.16)

## 2021-04-30 PROCEDURE — 83735 ASSAY OF MAGNESIUM: CPT | Performed by: INTERNAL MEDICINE

## 2021-04-30 PROCEDURE — 99239 HOSP IP/OBS DSCHRG MGMT >30: CPT | Performed by: FAMILY MEDICINE

## 2021-04-30 PROCEDURE — 80053 COMPREHEN METABOLIC PANEL: CPT | Performed by: INTERNAL MEDICINE

## 2021-04-30 PROCEDURE — C9113 INJ PANTOPRAZOLE SODIUM, VIA: HCPCS | Performed by: INTERNAL MEDICINE

## 2021-04-30 PROCEDURE — 84100 ASSAY OF PHOSPHORUS: CPT | Performed by: INTERNAL MEDICINE

## 2021-04-30 PROCEDURE — 99232 SBSQ HOSP IP/OBS MODERATE 35: CPT | Performed by: INTERNAL MEDICINE

## 2021-04-30 PROCEDURE — 85025 COMPLETE CBC W/AUTO DIFF WBC: CPT | Performed by: INTERNAL MEDICINE

## 2021-04-30 RX ORDER — PANTOPRAZOLE SODIUM 40 MG/1
40 TABLET, DELAYED RELEASE ORAL DAILY
Qty: 30 TABLET | Refills: 0 | Status: SHIPPED | OUTPATIENT
Start: 2021-04-30 | End: 2021-09-29

## 2021-04-30 RX ORDER — OXYCODONE HYDROCHLORIDE 10 MG/1
10 TABLET ORAL EVERY 4 HOURS PRN
Status: DISCONTINUED | OUTPATIENT
Start: 2021-04-30 | End: 2021-04-30 | Stop reason: HOSPADM

## 2021-04-30 RX ORDER — OXYCODONE HYDROCHLORIDE 5 MG/1
5 TABLET ORAL EVERY 4 HOURS PRN
Qty: 15 TABLET | Refills: 0 | Status: SHIPPED | OUTPATIENT
Start: 2021-04-30 | End: 2021-05-10

## 2021-04-30 RX ADMIN — PANTOPRAZOLE SODIUM 40 MG: 40 INJECTION, POWDER, FOR SOLUTION INTRAVENOUS at 08:35

## 2021-04-30 RX ADMIN — OXYCODONE HYDROCHLORIDE 10 MG: 10 TABLET ORAL at 07:53

## 2021-04-30 RX ADMIN — CEFAZOLIN SODIUM 2000 MG: 2 SOLUTION INTRAVENOUS at 05:42

## 2021-04-30 RX ADMIN — METRONIDAZOLE 500 MG: 500 INJECTION, SOLUTION INTRAVENOUS at 05:42

## 2021-04-30 RX ADMIN — OXYCODONE HYDROCHLORIDE 10 MG: 10 TABLET ORAL at 00:31

## 2021-04-30 NOTE — DISCHARGE SUMMARY
[facility]  Discharge- [facility] 1975, 40 y o  female MRN: [id number]  Unit/Bed#: [facility] 303-01 Encounter: [id number]  Primary Care Provider: Almeta Pallas, DO   Date and time admitted to hospital: 4/28/2021  9:09 PM    * Choledocholithiasis  Assessment & Plan  40year-old with history of cholecystectomy, choledocholithiasis status post ERCP in 2015 for removal of stone, presented to [facility] with abdominal pain with nausea and vomiting  · Etiology of abd pain and transaminitis  · Given size of stone recommended per GI for transfer to [facility] for ERCP  · Pt empirically started on abx with ancef/flagyl  No sign of acute infxn process  · Cont supportive care  · Underwent ERCP 4/29: majority of stone fragments have been removed with lithotripsy and stent was placed in CBD  ·  LFTs improving on morning labs; continue to monitor  · However patient is in significant pain post-procedure, lipase was normal   · Pain is well controlled  Tolerating  Okay to discharge home  · Need repeat ERCP in 4-6 weeks, follow up with GI    Elevated LFTs  Assessment & Plan  Secondary to choledocholithiasis  Trending down after ERCP  Normal acetaminophen level  Nonreactive hepatitis panel      Asthma  Assessment & Plan  No sign of exacerbation    Tobacco use  Assessment & Plan  Educated on cessation  Will place on nicotine patch         Discharging Physician / Practitioner: Radha Geiger MD  PCP: Almeta Pallas, DO  Admission Date:   Admission Orders (From admission, onward)     Ordered        04/28/21 2130  Inpatient Admission  Once                   Discharge Date: 04/30/21    Resolved Problems  Date Reviewed: 4/29/2021    None          Consultations During Hospital Stay:  · gastroenterology    Procedures Performed:   IMPRESSION:  Successful ERCP with cholangioscopy to identify the large common bile duct stone    Electrohydraulic lithotripsy was done, sphincteroplasty was done and majority of the stone fragments were removed  Not all could be removed  A stent was placed          Significant Findings / Test Results:   MRI abdomen:    IMPRESSION:  Choledocholithiasis with  Intrahepatic and extrahepatic biliary ductal dilation  The abnormality noted on the CT scan corresponds to a 2 0 x 1 3 cm CBD stone    Incidental Findings:   · none     Test Results Pending at Discharge (will require follow up):   · none     Outpatient Tests Requested:  · ERCP in 4-6 weeks    Complications:  None    Reason for Admission:  Abdominal pain    Hospital Course:     Kenney Ramires is a 40 y o  female patient who originally presented to the hospital on 4/28/2021 due to abdominal pain  Patient has history of cholecystectomy, choledocholithiasis status post ERCP in 2015 with multiple extraction of stones  This admission, MRI reveals choledocholithiasis with biliary ductal dilatation and CBD stone  Patient underwent ERCP with lithotripsy and extraction of multiple stone fragments and stent  Patient's pain was controlled with Dilaudid and oxycodone  This morning patient feels significantly better  Patient tolerated diet well  Patient is ready for discharge with follow-up with Gastroenterology with repeat ERCP and removal of the stent in 4-6 weeks  Please see above list of diagnoses and related plan for additional information  Condition at Discharge: good     Discharge Day Visit / Exam:     Subjective:  Patient states abdominal pain is significantly better compared to yesterday  Patient had breakfast and lunch without any worsening abdominal pain      Vitals: Blood Pressure: 137/74 (04/30/21 0700)  Pulse: 56 (04/30/21 0700)  Temperature: 97 7 °F (36 5 °C) (04/30/21 0700)  Temp Source: Oral (04/30/21 0008)  Respirations: 17 (04/30/21 0700)  Height: 5' 2" (157 5 cm) (04/29/21 1255)  Weight - Scale: 64 kg (141 lb) (04/29/21 1255)  SpO2: 98 % (04/30/21 0700)  Exam:   Physical Exam  Constitutional:       Appearance: She is well-developed  HENT:      Head: Normocephalic and atraumatic  Neck:      Musculoskeletal: Normal range of motion  Pulmonary:      Effort: Pulmonary effort is normal  No respiratory distress  Breath sounds: Normal breath sounds  Skin:     General: Skin is warm and dry  Discharge instructions/Information to patient and family:   See after visit summary for information provided to patient and family  Provisions for Follow-Up Care:  See after visit summary for information related to follow-up care and any pertinent home health orders  Disposition:     Home     Planned Readmission: no     Discharge Statement:  I spent 45 minutes discharging the patient  This time was spent on the day of discharge  I had direct contact with the patient on the day of discharge  Greater than 50% of the total time was spent examining patient, answering all patient questions, arranging and discussing plan of care with patient as well as directly providing post-discharge instructions  Additional time then spent on discharge activities  Discharge Medications:  See after visit summary for reconciled discharge medications provided to patient and family        ** Please Note: This note has been constructed using a voice recognition system **

## 2021-04-30 NOTE — QUICK NOTE
Notified by RN that pt was in pain, unrelieved by 5mg oxycodone and 2mg of morphine  GI saw the pt and gave one time dose of 0 5mg dilaudid which seemed to relieve some of the pain  On evaluation the pt was visibly uncomfortable but tolerating her pain, stating it was 5-6/10  Pt said earlier today when they gave her the morphine she felt they pushed it too fast and gave her pain at the IV site  She mentioned that she did not like taking all of this pain medication as someone in her family had gone through addiction problems  At this time she still appears to have persistent pain  Will change pain management to 5mg oxycodone for moderate pain, 10mg oxycodone for severe pain and 0 5mg dilaudid for breakthrough pain  GI plans to follow up tomorrow with labs and potential imaging for stone fragments vs post procedure pancreatitis

## 2021-04-30 NOTE — QUICK NOTE
GI On-Call Note:    Message received by [facility] and also by patient's nurse earlier this evening regarding severe pain postprocedure  Patient underwent ERCP today with cholangioscopy for large common bile duct stone  Underwent electrohydraulic lithotripsy with sphincteroplasty in the majority of stone fragments were removed  Checked STAT labs for concern for possible post-ERCP pancreatitis  Overall, LFTs appeared to be improving  Borderline leukocytosis which is likely secondary to inflammatory reaction  Low suspicion for infection  Lipase noted to be normal       Patient seen and evaluated at bedside  Patient reporting severe pain in the epigastrium which is similar in quality to the pain that she was experiencing prior to procedure  Pain is unrelenting  Patient received 5 of oxycodone and 2 of IV morphine with no improvement in her pain  Mild nausea but no vomiting  Unclear if pain is secondary to stone fragments that may remain within the CBD versus early post-ERCP pancreatitis? I have given her an additional one-time dose of IV Dilaudid 0 5 mg and have notified [facility] attending as well  May require escalation of pain regimen  If pain persists, may warrant repeat imaging  Will follow-up again in the morning along with repeat labs  BEAN Marcos  Gastroenterology Fellow  [facility]  Available on [email or web address]

## 2021-04-30 NOTE — PLAN OF CARE
Problem: Potential for Falls  Goal: Patient will remain free of falls  Description: INTERVENTIONS:  - Assess patient frequently for physical needs  -  Identify cognitive and physical deficits and behaviors that affect risk of falls    -  [place] fall precautions as indicated by assessment   - Educate patient/family on patient safety including physical limitations  - Instruct patient to call for assistance with activity based on assessment  - Modify environment to reduce risk of injury  - Consider OT/PT consult to assist with strengthening/mobility  Outcome: Progressing     Problem: Prexisting or High Potential for Compromised Skin Integrity  Goal: Skin integrity is maintained or improved  Description: INTERVENTIONS:  - Identify patients at risk for skin breakdown  - Assess and monitor skin integrity  - Assess and monitor nutrition and hydration status  - Monitor labs   - Assess for incontinence   - Turn and reposition patient  - Assist with mobility/ambulation  - Relieve pressure over bony prominences  - Avoid friction and shearing  - Provide appropriate hygiene as needed including keeping skin clean and dry  - Evaluate need for skin moisturizer/barrier cream  - Collaborate with interdisciplinary team   - Patient/family teaching  - Consider wound care consult   Outcome: Progressing

## 2021-04-30 NOTE — ASSESSMENT & PLAN NOTE
Secondary to choledocholithiasis  Trending down after ERCP  Normal acetaminophen level  Nonreactive hepatitis panel

## 2021-04-30 NOTE — PROGRESS NOTES
Progress Note- Jeanna Jolley 40 y o  female MRN: [id number]    Unit/Bed#: [facility] 303-01 Encounter: [id number]      Assessment and Plan:    43-year-old with history of cholecystectomy, choledocholithiasis status post ERCP in 2015 for removal of stone  Patient presented to [facility] with abdominal pain with nausea and vomiting  Imaging showed CBD 18 mm with 2 cm stone in the distal CBD  Patient underwent  ERCP yesterday with EHL and extraction of multiple stone fragments  Stent was placed afterwards  She had intense abdominal pain last night  Lipase was normal     1  Choledocholithiasis    Patient underwent  ERCP yesterday with EHL and extraction of multiple stone fragments  Stent was placed afterwards  We will do ERCP in 4-6 weeks to remove stent and any remaining stone fragments  Patient tolerating diet this morning  She can be discharged home later today if tolerating regular diet  2  Abdominal pain     Occurred post procedure  Improved  Tolerating diet  Alex Jay MD  Gastroenterology Fellow  [address]  Date: April 30, 2021        ______________________________________________________________________    Subjective:       Patient says abdominal pain has improved a lot  Tolerating diet  She wants to go home today      Medication Administration - last 24 hours from 04/29/2021 1108 to 04/30/2021 1108       Date/Time Order Dose Route Action Action by     04/29/2021 2349 ondansetron (ZOFRAN) injection 4 mg 4 mg Intravenous Given Valrie Simmonds, RN     04/29/2021 1722 ondansetron (ZOFRAN) injection 4 mg 4 mg Intravenous Given Marina Oden RN     04/30/2021 0612 ceFAZolin (ANCEF) IVPB (premix in dextrose) 2,000 mg 50 mL 0 mg Intravenous Stopped Valrie Simmonds, RN     04/30/2021 0542 ceFAZolin (ANCEF) IVPB (premix in dextrose) 2,000 mg 50 mL 2,000 mg Intravenous Karan [address] [place] Darell Arreaga, [facility]     04/29/2021 2320 ceFAZolin (ANCEF) IVPB (premix in dextrose) 2,000 mg 50 mL 0 mg Intravenous Stopped Carmelia Asp, RN     04/29/2021 2250 ceFAZolin (ANCEF) IVPB (premix in dextrose) 2,000 mg 50 mL 2,000 [facility]     04/29/2021 1650 ceFAZolin (ANCEF) IVPB (premix in dextrose) 2,000 mg 50 mL 2,000 mg Intravenous New Bag Carlitos Prude, RN     04/30/2021 0612 metroNIDAZOLE (FLAGYL) IVPB (premix) 500 mg 100 mL 0 mg Intravenous Stopped Carmelia Asp, RN     04/30/2021 0542 metroNIDAZOLE (FLAGYL) IVPB (premix) 500 mg 100 mL 500 mg Intravenous 2155 [facility], RN     04/29/2021 2321 metroNIDAZOLE (FLAGYL) IVPB (premix) 500 mg 100 mL 0 mg Intravenous Stopped Carmelia Asp, RN     04/29/2021 2251 metroNIDAZOLE (FLAGYL) IVPB (premix) 500 mg 100 mL 500 mg Intravenous [facility], RN     04/29/2021 1643 metroNIDAZOLE (FLAGYL) IVPB (premix) 500 mg 100 mL 500 mg Intravenous New Bag Carlitos Prude, RN     04/29/2021 2058 morphine injection 2 mg 2 mg Intravenous Given Carmelia Asp, RN     04/29/2021 1712 morphine injection 2 mg 2 mg Intravenous Given Carlitos Prude, RN     04/29/2021 2030 oxyCODONE (ROXICODONE) IR tablet 5 mg 5 mg Oral Given Carmelia Asp, RN     04/29/2021 1643 oxyCODONE (ROXICODONE) IR tablet 5 mg 5 mg Oral Given Carlitos Prude, RN     04/30/2021 0835 pantoprazole (PROTONIX) injection 40 mg 40 mg Intravenous Given Scarlet Sous, RN     04/29/2021 2055 pantoprazole (PROTONIX) injection 40 mg 40 mg Intravenous Given Carmelia Asp, RN     04/29/2021 1749 sodium chloride 0 9 % infusion 0 mL/hr Intravenous Stopped Carlitos Prude, RN     04/30/2021 0833 nicotine (NICODERM CQ) 14 mg/24hr TD 24 hr patch 14 mg 14 mg Transdermal Not Given Scarlet Sous, RN     04/29/2021 1749 lactated ringers infusion 0 mL/hr Intravenous Stopped Carlitos Ho, BAM     04/29/2021 1643 lactated ringers infusion 125 mL/hr Intravenous New Bag Carlitos Ho, BAM     04/29/2021 1749 lactated ringers infusion 50 mL/hr Intravenous New Bag Leah Tay BEAN Coe RN     04/29/2021 1637 lactated ringers infusion 0 mL/hr Intravenous Stopped Harmeet Crump RN     04/29/2021 1547 ondansetron (ZOFRAN) injection 4 mg 4 mg Intravenous Given Albert Miller RN     04/29/2021 1805 morphine injection 2 mg 2 mg Intravenous Given Harmeet Crump RN     04/29/2021 2141 potassium chloride (K-DUR,KLOR-CON) CR tablet 40 mEq 40 mEq Oral Given Ashley Lange RN     04/29/2021 2141 HYDROmorphone (DILAUDID) injection 0 5 mg 0 5 mg Intravenous Given Ashley Lange RN     04/30/2021 1304 HYDROmorphone (DILAUDID) injection 0 5 mg 0 5 mg Intravenous Not Given Fransico Goldberg, RN     04/30/2021 0753 oxyCODONE (ROXICODONE) immediate release tablet 10 mg 10 mg Oral Given Ashley Lange RN     04/30/2021 0544 oxyCODONE (ROXICODONE) immediate release tablet 10 mg 10 mg Oral Not Given Ashley Lange RN     04/30/2021 0031 oxyCODONE (ROXICODONE) immediate release tablet 10 mg 10 mg Oral Given Ashley Lange RN          Objective:     Vitals: Blood pressure 137/74, pulse 56, temperature 97 7 °F (36 5 °C), resp  rate 17, height 5' 2" (1 575 m), weight 64 kg (141 lb), SpO2 98 %  ,Body mass index is 25 79 kg/m²  Intake/Output Summary (Last 24 hours) at 4/30/2021 1108  Last data filed at 4/30/2021 0948  Gross per 24 hour   Intake 2379 17 ml   Output 1 ml   Net 2378 17 ml       Physical Exam:   General Appearance:   Alert, cooperative, no distress   HEENT:   Normocephalic, atraumatic, anicteric  Neck:  Supple, symmetrical, trachea midline   Lungs:   Clear to auscultation bilaterally; no rales, rhonchi or wheezing; respirations unlabored    Heart[de-identified]   Regular rate and rhythm; no murmur, rub, or gallop     Abdomen:   Soft, non-tender, non-distended; normal bowel sounds; no masses, no organomegaly    Genitalia:   Deferred    Rectal:   Deferred    Extremities:  No cyanosis, clubbing or edema    Pulses:  2+ and symmetric all extremities    Skin:  No jaundice, rashes, or lesions Lymph nodes:  No palpable cervical lymphadenopathy          Invasive Devices     Peripheral Intravenous Line            Peripheral IV 04/28/21 Right Hand 1 day                Lab Results:  Admission on 04/28/2021   Component Date Value    WBC 04/29/2021 7 92     RBC 04/29/2021 3 76*    Hemoglobin 04/29/2021 12 6     Hematocrit 04/29/2021 38 0     MCV 04/29/2021 101*    MCH 04/29/2021 33 5     MCHC 04/29/2021 33 2     RDW 04/29/2021 13 2     Platelets 65/24/8780 283     MPV 04/29/2021 10 1     Sodium 04/29/2021 141     Potassium 04/29/2021 3 6     Chloride 04/29/2021 110*    CO2 04/29/2021 28     ANION GAP 04/29/2021 3*    BUN 04/29/2021 8     Creatinine 04/29/2021 0 83     Glucose 04/29/2021 87     Calcium 04/29/2021 8 7     Corrected Calcium 04/29/2021 9 4     AST 04/29/2021 138*    ALT 04/29/2021 381*    Alkaline Phosphatase 04/29/2021 180*    Total Protein 04/29/2021 6 7     Albumin 04/29/2021 3 1*    Total Bilirubin 04/29/2021 0 52     eGFR 04/29/2021 85     WBC 04/29/2021 11 28*    RBC 04/29/2021 4 01     Hemoglobin 04/29/2021 13 4     Hematocrit 04/29/2021 38 9     MCV 04/29/2021 97     MCH 04/29/2021 33 4     MCHC 04/29/2021 34 4     RDW 04/29/2021 13 0     MPV 04/29/2021 9 7     Platelets 89/30/9653 297     nRBC 04/29/2021 0     Neutrophils Relative 04/29/2021 92*    Immat GRANS % 04/29/2021 0     Lymphocytes Relative 04/29/2021 7*    Monocytes Relative 04/29/2021 1*    Eosinophils Relative 04/29/2021 0     Basophils Relative 04/29/2021 0     Neutrophils Absolute 04/29/2021 10 34*    Immature Grans Absolute 04/29/2021 0 04     Lymphocytes Absolute 04/29/2021 0 73     Monocytes Absolute 04/29/2021 0 16*    Eosinophils Absolute 04/29/2021 0 00     Basophils Absolute 04/29/2021 0 01     Sodium 04/29/2021 143     Potassium 04/29/2021 3 0*    Chloride 04/29/2021 113*    CO2 04/29/2021 21     ANION GAP 04/29/2021 9     BUN 04/29/2021 7     Creatinine 04/29/2021 0 93     Glucose 04/29/2021 149*    Calcium 04/29/2021 8 9     Corrected Calcium 04/29/2021 9 4     AST 04/29/2021 119*    ALT 04/29/2021 306*    Alkaline Phosphatase 04/29/2021 176*    Total Protein 04/29/2021 7 5     Albumin 04/29/2021 3 4*    Total Bilirubin 04/29/2021 0 43     eGFR 04/29/2021 74     Lipase 04/29/2021 52*    WBC 04/30/2021 12 60*    RBC 04/30/2021 3 37*    Hemoglobin 04/30/2021 11 2*    Hematocrit 04/30/2021 33 7*    MCV 04/30/2021 100*    MCH 04/30/2021 33 2     MCHC 04/30/2021 33 2     RDW 04/30/2021 13 0     MPV 04/30/2021 10 2     Platelets 19/36/0118 258     nRBC 04/30/2021 0     Neutrophils Relative 04/30/2021 83*    Immat GRANS % 04/30/2021 0     Lymphocytes Relative 04/30/2021 10*    Monocytes Relative 04/30/2021 7     Eosinophils Relative 04/30/2021 0     Basophils Relative 04/30/2021 0     Neutrophils Absolute 04/30/2021 10 46*    Immature Grans Absolute 04/30/2021 0 05     Lymphocytes Absolute 04/30/2021 1 25     Monocytes Absolute 04/30/2021 0 83     Eosinophils Absolute 04/30/2021 0 00     Basophils Absolute 04/30/2021 0 01     Sodium 04/30/2021 142     Potassium 04/30/2021 4 0     Chloride 04/30/2021 112*    CO2 04/30/2021 22     ANION GAP 04/30/2021 8     BUN 04/30/2021 6     Creatinine 04/30/2021 0 66     Glucose 04/30/2021 105     Calcium 04/30/2021 8 2*    Corrected Calcium 04/30/2021 9 2     AST 04/30/2021 91*    ALT 04/30/2021 199*    Alkaline Phosphatase 04/30/2021 137*    Total Protein 04/30/2021 5 7*    Albumin 04/30/2021 2 7*    Total Bilirubin 04/30/2021 0 47     eGFR 04/30/2021 107     Magnesium 04/30/2021 1 8     Phosphorus 04/30/2021 2 3*       Imaging Studies: I have personally reviewed pertinent imaging studies

## 2021-05-01 LAB
ACTIN IGG SERPL-ACNC: 6 UNITS (ref 0–19)
HSV1 DNA SPEC QL NAA+PROBE: NEGATIVE
HSV2 DNA SPEC QL NAA+PROBE: NEGATIVE

## 2021-05-03 ENCOUNTER — TELEPHONE (OUTPATIENT)
Dept: GASTROENTEROLOGY | Facility: CLINIC | Age: 46
End: 2021-05-03

## 2021-05-03 NOTE — TELEPHONE ENCOUNTER
Advised pt of provider recommendations  Denies eating fatty/greasy foods  Instructed pt to call office if symptoms remain unchanged after 2-3 days or worsen  Pt verbalized understanding  Also inquired about when her next appt is for stent removal/office visit  I saw Chiquis Gates called earlier for appt  Please schedule and instructed pt to stay by phone

## 2021-05-03 NOTE — TELEPHONE ENCOUNTER
GI Physician: Dr Ramona Skinner for Medication: Ocycodone    Dose: 5 mg    Quantity: 15    Pharmacy and Location: [facility]

## 2021-05-03 NOTE — TELEPHONE ENCOUNTER
Also please advise patient to take tylenol (no more than 3 grams a day) for pain rather than any oxy she may have left as this may worsen the bloating ans cause constipation

## 2021-05-03 NOTE — UTILIZATION REVIEW
Notification of Discharge   This is a Notification of Discharge from our facility [address]  Please be advised that this patient has been discharge from our facility  Below you will find the admission and discharge date and time including the patients disposition  UTILIZATION REVIEW CONTACT:  Wilber Salmeron  Utilization   Network Utilization Review Department  Phone: [phone] x carefully listen to the prompts  All voicemails are confidential   Email: [email or web address]     PHYSICIAN ADVISORY SERVICES:  FOR [id number] REVIEW - MEDICAL NECESSITY DENIAL  Phone: [phone]  Fax: [phone]  Email: [email or web address]     PRESENTATION DATE: 4/28/2021  9:09 PM  OBERVATION ADMISSION DATE:   INPATIENT ADMISSION DATE: 4/28/21 2109   DISCHARGE DATE: 4/30/2021  1:08 PM  DISPOSITION: Home/Self Care Home/Self Care      IMPORTANT INFORMATION:  Send all requests for admission clinical reviews, approved or denied determinations and any other requests to dedicated fax number below belonging to the campus where the patient is receiving treatment   List of dedicated fax numbers:  [address] DENIALS (Administrative/Medical Necessity) [phone]   [address] (Maternity/NICU/Pediatrics) [phone]   Kenny Nolan [phone]   Karen Sandoval [phone]   Anabel Catherine [phone]   Almaz Hood [facility] 1525 [facility] [phone]   [facility]  [phone]   [address] [facility]  2401 [facility] 1000 [address] [facility] [phone]

## 2021-05-03 NOTE — TELEPHONE ENCOUNTER
I had called the patient earlier this morning and at that time she denied having any abdominal pain but states that her pain comes at night  She had been tolerating food as well the past couple of days  I called again just now to follow up and she states that she has some pain but not too much  She will try Gas-X and Advil  I did tell as the pain worsens she should go to the emergency room  She is in agreement  She will also do a clear liquid diet

## 2021-05-03 NOTE — TELEPHONE ENCOUNTER
Please advise patient to stay away from fatty foods as this can aggravate her pancreas post-ERCP  Today, she should follow a clear liquid diet and then she should tolerate very slowly over the next week  If the clears go well today, she can advance to a full liquid diet tomorrow, then so on and so forth  By the end of the week, she should be eating solid meals but she should stay away from fatty and greasy foods for the next week  Eating "normally" can cause this type of pain post-ERCP  If these recs do not help her within the next 2-3 days, she would need to go to ED for further eval of potential post-ERCP pancreatitis

## 2021-05-03 NOTE — TELEPHONE ENCOUNTER
Pt called complaining of midepigastric pain radiating to back  She had an ERCP with Dr Maco Bardales 4/29/21 and has been discharged fom the hospital on 4/30/21  She took her 10 day supply of 5mg oxycodone already  Currently taking tylenol for this pain and rates 8 out 10  Per pt she required more than 5mg of oxycodone to control her pain in hospital   Denies N/V/D  Pt also feeling bloated but having normal BM, eating/drinking normal  Advised to take OTC gas x for this  She has not been scheduled for an office follow up , will send to clerical team  Please advise regarding pain

## 2021-05-04 ENCOUNTER — PREP FOR PROCEDURE (OUTPATIENT)
Dept: GASTROENTEROLOGY | Facility: CLINIC | Age: 46
End: 2021-05-04

## 2021-05-04 ENCOUNTER — TELEPHONE (OUTPATIENT)
Dept: GASTROENTEROLOGY | Facility: CLINIC | Age: 46
End: 2021-05-04

## 2021-05-04 DIAGNOSIS — K80.50 CHOLEDOCHOLITHIASIS: Primary | ICD-10-CM

## 2021-05-04 NOTE — TELEPHONE ENCOUNTER
ERCP scheduled on 6/1/21 with Dr Espinosa at [facility]  I gave pt verbal instructions/mailed

## 2021-05-04 NOTE — TELEPHONE ENCOUNTER
----- Message from Brielle Paulino sent at 4/30/2021  3:15 PM EDT -----    ----- Message -----  From: Pradip Recio MD  Sent: 4/30/2021  11:10 AM EDT  To: Gastroenterology Surgery Coordinator      Please schedule for ERCP in 4-6 weeks with Dr Luigi Dakin  Thank you

## 2021-05-04 NOTE — UTILIZATION REVIEW
Notification of Discharge   This is a Notification of Discharge from our facility [address]  Please be advised that this patient has been discharge from our facility  Below you will find the admission and discharge date and time including the patients disposition  UTILIZATION REVIEW CONTACT:  Gerard Walker  Utilization   Network Utilization Review Department  Phone: [phone] x carefully listen to the prompts  All voicemails are confidential   Email: [email or web address]     PHYSICIAN ADVISORY SERVICES:  FOR [id number] REVIEW - MEDICAL NECESSITY DENIAL  Phone: [phone]  Fax: [phone]  Email: [email or web address]     PRESENTATION DATE: 4/26/2021  7:38 PM  OBERVATION ADMISSION DATE:   INPATIENT ADMISSION DATE: 4/26/21 2318   DISCHARGE DATE: 4/27/2021 11:56 AM  DISPOSITION: Home/Self Care Home/Self Care      IMPORTANT INFORMATION:  Send all requests for admission clinical reviews, approved or denied determinations and any other requests to dedicated fax number below belonging to the campus where the patient is receiving treatment   List of dedicated fax numbers:  [address] DENIALS (Administrative/Medical Necessity) [phone]   [address] [place] [address] (Maternity/NICU/Pediatrics) [phone]   Rogers Boots [phone]   Ladean Ros [phone]   Tobias Espinoza [phone]   [facility] [address] [place] [address] [phone]   [facility]  [phone]   [address] [facility]  24[address] [facility] 1000 [address] [facility] [phone]

## 2021-05-28 ENCOUNTER — TELEPHONE (OUTPATIENT)
Dept: GASTROENTEROLOGY | Facility: HOSPITAL | Age: 46
End: 2021-05-28

## 2021-06-01 ENCOUNTER — TRANSCRIBE ORDERS (OUTPATIENT)
Dept: ADMINISTRATIVE | Facility: HOSPITAL | Age: 46
End: 2021-06-01

## 2021-09-01 ENCOUNTER — OFFICE VISIT (OUTPATIENT)
Dept: FAMILY MEDICINE CLINIC | Facility: CLINIC | Age: 46
End: 2021-09-01

## 2021-09-01 ENCOUNTER — APPOINTMENT (OUTPATIENT)
Dept: LAB | Facility: CLINIC | Age: 46
End: 2021-09-01
Payer: COMMERCIAL

## 2021-09-01 VITALS
SYSTOLIC BLOOD PRESSURE: 118 MMHG | DIASTOLIC BLOOD PRESSURE: 74 MMHG | TEMPERATURE: 97.7 F | HEART RATE: 84 BPM | OXYGEN SATURATION: 98 % | BODY MASS INDEX: 26.52 KG/M2 | WEIGHT: 145 LBS | RESPIRATION RATE: 16 BRPM

## 2021-09-01 DIAGNOSIS — Z11.4 ENCOUNTER FOR SCREENING FOR HIV: ICD-10-CM

## 2021-09-01 DIAGNOSIS — J45.20 MILD INTERMITTENT ASTHMA WITHOUT COMPLICATION: ICD-10-CM

## 2021-09-01 DIAGNOSIS — F32.A ANXIETY AND DEPRESSION: ICD-10-CM

## 2021-09-01 DIAGNOSIS — K80.50 CHOLEDOCHOLITHIASIS: ICD-10-CM

## 2021-09-01 DIAGNOSIS — N92.6 IRREGULAR MENSES: ICD-10-CM

## 2021-09-01 DIAGNOSIS — Z12.31 ENCOUNTER FOR SCREENING MAMMOGRAM FOR MALIGNANT NEOPLASM OF BREAST: ICD-10-CM

## 2021-09-01 DIAGNOSIS — Z11.3 ROUTINE SCREENING FOR STI (SEXUALLY TRANSMITTED INFECTION): ICD-10-CM

## 2021-09-01 DIAGNOSIS — F41.9 ANXIETY AND DEPRESSION: ICD-10-CM

## 2021-09-01 DIAGNOSIS — Z00.00 ANNUAL PHYSICAL EXAM: Primary | ICD-10-CM

## 2021-09-01 LAB
ALBUMIN SERPL BCP-MCNC: 4 G/DL (ref 3.5–5)
ALP SERPL-CCNC: 99 U/L (ref 46–116)
ALT SERPL W P-5'-P-CCNC: 30 U/L (ref 12–78)
ANION GAP SERPL CALCULATED.3IONS-SCNC: 6 MMOL/L (ref 4–13)
AST SERPL W P-5'-P-CCNC: 25 U/L (ref 5–45)
BASOPHILS # BLD AUTO: 0.02 THOUSANDS/ΜL (ref 0–0.1)
BASOPHILS NFR BLD AUTO: 0 % (ref 0–1)
BILIRUB SERPL-MCNC: 0.56 MG/DL (ref 0.2–1)
BUN SERPL-MCNC: 12 MG/DL (ref 5–25)
CALCIUM SERPL-MCNC: 9.3 MG/DL (ref 8.3–10.1)
CHLORIDE SERPL-SCNC: 107 MMOL/L (ref 100–108)
CHOLEST SERPL-MCNC: 242 MG/DL (ref 50–200)
CO2 SERPL-SCNC: 26 MMOL/L (ref 21–32)
CREAT SERPL-MCNC: 0.74 MG/DL (ref 0.6–1.3)
EOSINOPHIL # BLD AUTO: 0.11 THOUSAND/ΜL (ref 0–0.61)
EOSINOPHIL NFR BLD AUTO: 1 % (ref 0–6)
ERYTHROCYTE [DISTWIDTH] IN BLOOD BY AUTOMATED COUNT: 13.4 % (ref 11.6–15.1)
GFR SERPL CREATININE-BSD FRML MDRD: 98 ML/MIN/1.73SQ M
GLUCOSE P FAST SERPL-MCNC: 77 MG/DL (ref 65–99)
HCT VFR BLD AUTO: 46.6 % (ref 34.8–46.1)
HDLC SERPL-MCNC: 54 MG/DL
HGB BLD-MCNC: 15.3 G/DL (ref 11.5–15.4)
IMM GRANULOCYTES # BLD AUTO: 0.02 THOUSAND/UL (ref 0–0.2)
IMM GRANULOCYTES NFR BLD AUTO: 0 % (ref 0–2)
LDLC SERPL CALC-MCNC: 170 MG/DL (ref 0–100)
LYMPHOCYTES # BLD AUTO: 2.66 THOUSANDS/ΜL (ref 0.6–4.47)
LYMPHOCYTES NFR BLD AUTO: 28 % (ref 14–44)
MCH RBC QN AUTO: 32.6 PG (ref 26.8–34.3)
MCHC RBC AUTO-ENTMCNC: 32.8 G/DL (ref 31.4–37.4)
MCV RBC AUTO: 99 FL (ref 82–98)
MONOCYTES # BLD AUTO: 0.63 THOUSAND/ΜL (ref 0.17–1.22)
MONOCYTES NFR BLD AUTO: 7 % (ref 4–12)
NEUTROPHILS # BLD AUTO: 6.11 THOUSANDS/ΜL (ref 1.85–7.62)
NEUTS SEG NFR BLD AUTO: 64 % (ref 43–75)
NONHDLC SERPL-MCNC: 188 MG/DL
NRBC BLD AUTO-RTO: 0 /100 WBCS
PLATELET # BLD AUTO: 369 THOUSANDS/UL (ref 149–390)
PMV BLD AUTO: 10 FL (ref 8.9–12.7)
POTASSIUM SERPL-SCNC: 3.7 MMOL/L (ref 3.5–5.3)
PROT SERPL-MCNC: 8.7 G/DL (ref 6.4–8.2)
RBC # BLD AUTO: 4.7 MILLION/UL (ref 3.81–5.12)
SODIUM SERPL-SCNC: 139 MMOL/L (ref 136–145)
TRIGL SERPL-MCNC: 88 MG/DL
TSH SERPL DL<=0.05 MIU/L-ACNC: 1.46 UIU/ML (ref 0.36–3.74)
WBC # BLD AUTO: 9.55 THOUSAND/UL (ref 4.31–10.16)

## 2021-09-01 PROCEDURE — 85025 COMPLETE CBC W/AUTO DIFF WBC: CPT

## 2021-09-01 PROCEDURE — 80053 COMPREHEN METABOLIC PANEL: CPT

## 2021-09-01 PROCEDURE — 87389 HIV-1 AG W/HIV-1&-2 AB AG IA: CPT

## 2021-09-01 PROCEDURE — 99386 PREV VISIT NEW AGE 40-64: CPT | Performed by: NURSE PRACTITIONER

## 2021-09-01 PROCEDURE — 86592 SYPHILIS TEST NON-TREP QUAL: CPT

## 2021-09-01 PROCEDURE — 36415 COLL VENOUS BLD VENIPUNCTURE: CPT

## 2021-09-01 PROCEDURE — 80061 LIPID PANEL: CPT

## 2021-09-01 PROCEDURE — 84443 ASSAY THYROID STIM HORMONE: CPT

## 2021-09-01 RX ORDER — ALBUTEROL SULFATE 90 UG/1
2 AEROSOL, METERED RESPIRATORY (INHALATION) EVERY 6 HOURS PRN
Qty: 18 G | Refills: 5 | Status: SHIPPED | OUTPATIENT
Start: 2021-09-01 | End: 2022-04-25 | Stop reason: SDUPTHER

## 2021-09-01 RX ORDER — ESCITALOPRAM OXALATE 10 MG/1
10 TABLET ORAL DAILY
Qty: 30 TABLET | Refills: 0 | Status: SHIPPED | OUTPATIENT
Start: 2021-09-01 | End: 2021-09-28

## 2021-09-01 RX ORDER — HYDROXYZINE HYDROCHLORIDE 25 MG/1
25 TABLET, FILM COATED ORAL 3 TIMES DAILY PRN
Qty: 40 TABLET | Refills: 0 | Status: SHIPPED | OUTPATIENT
Start: 2021-09-01 | End: 2021-09-29 | Stop reason: SDUPTHER

## 2021-09-01 NOTE — PATIENT INSTRUCTIONS

## 2021-09-01 NOTE — PROGRESS NOTES
[address] [facility]    NAME: Chelly Zafar  AGE: 40 y o  SEX: female  : 1975     DATE: 2021     Assessment and Plan:     Problem List Items Addressed This Visit        Digestive    Choledocholithiasis    Relevant Orders    Ambulatory referral to Gastroenterology    CBC and differential    Comprehensive metabolic panel    Lipid panel    TSH, 3rd generation with Free T4 reflex       Respiratory    Asthma    Relevant Medications    albuterol (Ventolin HFA) 90 mcg/act inhaler      Other Visit Diagnoses     Annual physical exam    -  Primary    Encounter for screening mammogram for malignant neoplasm of breast        Relevant Orders    Mammo screening bilateral w 3d & cad    Anxiety and depression        Relevant Medications    escitalopram (LEXAPRO) 10 mg tablet    hydrOXYzine HCL (ATARAX) 25 mg tablet    Encounter for screening for HIV        Relevant Orders    HIV 1/2 Antigen/Antibody (4th Generation) w Reflex [facility]    Routine screening for STI (sexually transmitted infection)        Relevant Orders    RPR    Irregular menses        Relevant Orders    CBC and differential    Comprehensive metabolic panel    Lipid panel    TSH, 3rd generation with Free T4 reflex          Immunizations and preventive care screenings were discussed with patient today  Appropriate education was printed on patient's after visit summary  Counseling:  Alcohol/drug use: discussed moderation in alcohol intake, the recommendations for healthy alcohol use, and avoidance of illicit drug use  Dental Health: discussed importance of regular tooth brushing, flossing, and dental visits  Injury prevention: discussed safety/seat belts, safety helmets, smoke detectors, carbon dioxide detectors, and smoking near bedding or upholstery    Sexual health: discussed sexually transmitted diseases, partner selection, use of condoms, avoidance of unintended pregnancy, and contraceptive alternatives  · Exercise: the importance of regular exercise/physical activity was discussed  Recommend exercise 3-5 times per week for at least 30 minutes  BMI Counseling: Body mass index is 26 52 kg/m²  The BMI is above normal  Nutrition recommendations include decreasing portion sizes, encouraging healthy choices of fruits and vegetables, decreasing fast food intake, consuming healthier snacks and limiting drinks that contain sugar  Exercise recommendations include exercising 3-5 times per week  Depression Screening and Follow-up Plan: Patient's depression screening was positive with a PHQ-2 score of 3  Their PHQ-9 score was 6  Patient assessed for underlying major depression  Brief counseling provided and recommend additional follow-up/re-evaluation next office visit  Tobacco Cessation Counseling: Tobacco cessation counseling was provided  The patient is sincerely urged to quit consumption of tobacco  She is not ready to quit tobacco  Medication options discussed  Return in about 4 weeks (around 9/29/2021) for anxiety  Chief Complaint:     Chief Complaint   Patient presents with    Establish Care      History of Present Illness:     Adult Annual Physical   Patient here for a comprehensive physical exam and to establish care  The patient reports she has been experiencing depression and anxiety 2/2 issues with her children's father  Her kids are all grown  She reports that she is safe at home but not happy  She is saving money to move into her own space        Depression Screening  PHQ-9 Depression Screening    PHQ-9:   Frequency of the following problems over the past two weeks:      Little interest or pleasure in doing things: 0 - not at all  Feeling down, depressed, or hopeless: 3 - nearly every day  Trouble falling or staying asleep, or sleeping too much: 2 - more than half the days  Feeling tired or having little energy: 0 - not at all  Poor appetite or overeatin - several days  Feeling bad about yourself - or that you are a failure or have let yourself or your family down: 0 - not at all  Trouble concentrating on things, such as reading the newspaper or watching television: 0 - not at all  Moving or speaking so slowly that other people could have noticed  Or the opposite - being so fidgety or restless that you have been moving around a lot more than usual: 0 - not at all  Thoughts that you would be better off dead, or of hurting yourself in some way: 0 - not at all  PHQ-2 Score: 3  PHQ-9 Score: 6       General Health  · Hearing: normal - bilateral   · Vision: no vision problems  · Dental: regular dental visits  /GYN Health  · Patient is: perimenopausal  · Last menstrual period: 2 weeks ago     Review of Systems:     Review of Systems   Constitutional: Negative for chills and fever  HENT: Negative for ear pain and sore throat  Eyes: Negative for pain and visual disturbance  Respiratory: Negative for cough and shortness of breath  Cardiovascular: Negative for chest pain and palpitations  Gastrointestinal: Negative for abdominal pain and vomiting  Genitourinary: Negative for dysuria and hematuria  Musculoskeletal: Negative for arthralgias and back pain  Skin: Negative for color change and rash  Neurological: Negative for seizures and syncope  Psychiatric/Behavioral: Positive for dysphoric mood and sleep disturbance  Negative for self-injury and suicidal ideas  The patient is nervous/anxious  All other systems reviewed and are negative       Past Medical History:     Past Medical History:   Diagnosis Date    Asthma     Gallstone       Past Surgical History:     Past Surgical History:   Procedure Laterality Date    CHOLECYSTECTOMY  2016      Social History:     Social History     Socioeconomic History    Marital status: Single     Spouse name: None    Number of children: None    Years of education: None    Highest education level: None   Occupational History    None   Tobacco Use    Smoking status: Current Every Day Smoker     Packs/day: 1 00    Smokeless tobacco: Current User   Vaping Use    Vaping Use: Never used   Substance and Sexual Activity    Alcohol use: Not Currently    Drug use: Not Currently    Sexual activity: Not Currently   Other Topics Concern    None   Social History Narrative    None     Social Determinants of Health     Financial Resource Strain: Medium Risk    Difficulty of Paying Living Expenses: Somewhat hard   Food Insecurity: No Food Insecurity    Worried About Running Out of Food in the Last Year: Never true    Flynn of Food in the Last Year: Never true   Transportation Needs: No Transportation Needs    Lack of Transportation (Medical): No    Lack of Transportation (Non-Medical):  No   Physical Activity:     Days of Exercise per Week:     Minutes of Exercise per Session:    Stress:     Feeling of Stress :    Social Connections:     Frequency of Communication with Friends and Family:     Frequency of Social Gatherings with Friends and Family:     Attends L[employer] Services:     Active Member of Clubs or Organizations:     Attends Club or Organization Meetings:     Marital Status:    Intimate Partner Violence:     Fear of Current or Ex-Partner:     Emotionally Abused:     Physically Abused:     Sexually Abused:       Family History:     Family History   Problem Relation Age of Onset    Asthma Mother       Current Medications:     Current Outpatient Medications   Medication Sig Dispense Refill    albuterol (Ventolin HFA) 90 mcg/act inhaler Inhale 2 puffs every 6 (six) hours as needed for wheezing 18 g 5    escitalopram (LEXAPRO) 10 mg tablet Take 1 tablet (10 mg total) by mouth daily 30 tablet 0    hydrOXYzine HCL (ATARAX) 25 mg tablet Take 1 tablet (25 mg total) by mouth 3 (three) times a day as needed for anxiety 40 tablet 0    pantoprazole (PROTONIX) 40 mg tablet Take 1 tablet (40 mg total) by mouth daily (Patient not taking: Reported on 9/1/2021) 30 tablet 0     No current facility-administered medications for this visit  Allergies:     No Known Allergies   Physical Exam:     /74 (BP Location: Right arm, Patient Position: Sitting, Cuff Size: Standard)   Pulse 84   Temp 97 7 °F (36 5 °C)   Resp 16   Wt 65 8 kg (145 lb)   LMP 08/25/2021 (Within Days)   SpO2 98%   BMI 26 52 kg/m²     Physical Exam  Vitals and nursing note reviewed  Constitutional:       General: She is not in acute distress  Appearance: She is well-developed  She is not diaphoretic  HENT:      Head: Normocephalic and atraumatic  Right Ear: External ear normal       Left Ear: External ear normal    Eyes:      Extraocular Movements: Extraocular movements intact  Conjunctiva/sclera: Conjunctivae normal       Pupils: Pupils are equal, round, and reactive to light  Cardiovascular:      Rate and Rhythm: Normal rate and regular rhythm  Pulses: Normal pulses  Heart sounds: Normal heart sounds  No murmur heard  Pulmonary:      Effort: Pulmonary effort is normal  No respiratory distress  Breath sounds: Normal breath sounds  Abdominal:      General: Bowel sounds are normal  There is no distension  Palpations: Abdomen is soft  Tenderness: There is no abdominal tenderness  Musculoskeletal:         General: Normal range of motion  Cervical back: Neck supple  Right lower leg: No edema  Left lower leg: No edema  Skin:     General: Skin is warm and dry  Neurological:      General: No focal deficit present  Mental Status: She is alert and oriented to person, place, and time  Psychiatric:         Mood and Affect: Mood normal  Affect is tearful           Behavior: Behavior normal           Aurelio Crys, [address] [facility]

## 2021-09-02 LAB
HIV 1+2 AB+HIV1 P24 AG SERPL QL IA: NORMAL
RPR SER QL: NORMAL

## 2021-09-28 DIAGNOSIS — F41.9 ANXIETY AND DEPRESSION: ICD-10-CM

## 2021-09-28 DIAGNOSIS — F32.A ANXIETY AND DEPRESSION: ICD-10-CM

## 2021-09-28 RX ORDER — ESCITALOPRAM OXALATE 10 MG/1
TABLET ORAL
Qty: 30 TABLET | Refills: 0 | Status: SHIPPED | OUTPATIENT
Start: 2021-09-28 | End: 2021-09-29 | Stop reason: SDUPTHER

## 2021-09-29 ENCOUNTER — OFFICE VISIT (OUTPATIENT)
Dept: FAMILY MEDICINE CLINIC | Facility: CLINIC | Age: 46
End: 2021-09-29

## 2021-09-29 VITALS
TEMPERATURE: 97.6 F | DIASTOLIC BLOOD PRESSURE: 80 MMHG | OXYGEN SATURATION: 99 % | HEART RATE: 79 BPM | SYSTOLIC BLOOD PRESSURE: 128 MMHG | WEIGHT: 147 LBS | BODY MASS INDEX: 26.89 KG/M2 | RESPIRATION RATE: 18 BRPM

## 2021-09-29 DIAGNOSIS — R42 DIZZINESS: Primary | ICD-10-CM

## 2021-09-29 DIAGNOSIS — K80.50 CHOLEDOCHOLITHIASIS: ICD-10-CM

## 2021-09-29 DIAGNOSIS — F32.A ANXIETY AND DEPRESSION: ICD-10-CM

## 2021-09-29 DIAGNOSIS — Z12.4 CERVICAL CANCER SCREENING: ICD-10-CM

## 2021-09-29 DIAGNOSIS — F41.9 ANXIETY AND DEPRESSION: ICD-10-CM

## 2021-09-29 DIAGNOSIS — J45.30 MILD PERSISTENT ASTHMA WITHOUT COMPLICATION: ICD-10-CM

## 2021-09-29 DIAGNOSIS — Z72.0 TOBACCO USE: ICD-10-CM

## 2021-09-29 LAB — SL AMB POCT GLUCOSE BLD: 107

## 2021-09-29 PROCEDURE — 82948 REAGENT STRIP/BLOOD GLUCOSE: CPT | Performed by: NURSE PRACTITIONER

## 2021-09-29 PROCEDURE — 93000 ELECTROCARDIOGRAM COMPLETE: CPT | Performed by: NURSE PRACTITIONER

## 2021-09-29 PROCEDURE — 99214 OFFICE O/P EST MOD 30 MIN: CPT | Performed by: NURSE PRACTITIONER

## 2021-09-29 RX ORDER — ESCITALOPRAM OXALATE 10 MG/1
10 TABLET ORAL DAILY
Qty: 30 TABLET | Refills: 0 | Status: SHIPPED | OUTPATIENT
Start: 2021-09-29

## 2021-09-29 RX ORDER — HYDROXYZINE HYDROCHLORIDE 25 MG/1
25 TABLET, FILM COATED ORAL 3 TIMES DAILY PRN
Qty: 40 TABLET | Refills: 0 | Status: SHIPPED | OUTPATIENT
Start: 2021-09-29

## 2021-09-29 RX ORDER — MECLIZINE HYDROCHLORIDE 25 MG/1
25 TABLET ORAL 3 TIMES DAILY PRN
Qty: 30 TABLET | Refills: 0 | Status: SHIPPED | OUTPATIENT
Start: 2021-09-29

## 2021-09-29 NOTE — ASSESSMENT & PLAN NOTE
She underwent ERCP/ lithotripsy 4/29/2021 and was recommend to have repeat ERCP 6 weeks post procedure but has not   Recommend she follow with GI - referral placed

## 2021-09-29 NOTE — PATIENT INSTRUCTIONS
Benefits of an Active Lifestyle   AMBULATORY CARE:   An active lifestyle  means you do physical activity throughout the day  Any activity that gets you up and moving is part of an active lifestyle  Physical activity includes exercise such as walking or lifting weights  It also includes playing sports  Physical activity is different from other kinds of activity, such as reading a book  This kind of activity is called sedentary  A sedentary lifestyle means you sit or do not move much during the day  An active lifestyle has many benefits, such as helping you prevent or manage health conditions  Call your doctor if:   · You notice changes in your health, such as new or worsening shortness of breath  · You have questions or concerns about your condition or care  Benefits of an active lifestyle:   · You may be able to do daily activities more easily  Activity helps condition your heart, lungs, and muscles  This can help you get through your daily activities without feeling tired  · You can help control your weight  Activity helps your body use the calories you eat instead of storing them as fat  Your body continues to burn calories at a higher rate after you are active  · Activity can increase your health  Activity helps lower your risk for cancer, heart disease, diabetes, and stroke  Activity can help you control your blood pressure and blood sugar levels, and lower your cholesterol  If you have arthritis, activity can help your joints move more easily and with less pain  · Your bones and muscles will get stronger  This will help prevent osteoporosis and reduce your risk for falls  · Activity can help improve your mood  Activity can reduce or prevent depression and stress  Activity can also help improve your sleep  Risks of a sedentary lifestyle:  A sedentary lifestyle increases your risk for diseases such as diabetes, high blood pressure, and heart disease   Your immune system also becomes weaker  This means it cannot fight infections well  How much activity you need:  Any activity is better than no activity at all  When you go from being mostly inactive to adding some activity, you will see health benefits  The following are general guidelines:  · Do aerobic activity several days each week  Aerobic activity includes walking, bicycling, dancing, swimming, and raking leaves  Aim for 150 to 300 minutes of moderate activity, or 75 to 150 of vigorous activity each week  You can also do a combination of moderate and vigorous activity  · Do strength training at least 2 times each week  Strength training helps you keep the muscles you have and build new muscles  Strength training includes pushups, yoga, mauricio chi, and weightlifting  If you do not have access to weights, you can lift items around your house  Try to work all the major muscle groups, such as your legs, arms, abdomen, and chest  Do 2 or 3 sets on each area  Use a weight that is slightly heavier than you can lift easily  You can work up to [employer]  You can also use resistance bands instead of weights  Steps you can take to become more active:   · Set goals  Set some long-term goals and some short-term goals  For example, you may want to be able to walk for 30 minutes without becoming short of breath  Try not to put time requirements on your goals  For example, do not think you should reach your goal in a month  Set smaller goals, such as walking a little longer each week, or feeling less shortness of breath  · Be active all day  Activity does not have to mean structured exercise each day  You can be more active by making small changes all day  For example, try parking as far from the entrance of buildings as you can when you run errands  If possible, walk or ride a bike instead of driving  Take the stairs instead of the elevator  · Keep a record of your activity and your progress    You can do this by writing down your daily activity  Include the kind of activity and how long you did it  You can also use a program on your phone or other device that will track activity for you  Also record your progress  You may be doing daily activities more easily, sleeping better, or building muscles  · Step counting can help you monitor activity  A general guide is to take 10,000 steps each day  A pedometer is a device you can wear to track your steps  Some phones have programs that will count and record steps  You may need to work up to 10,000 steps  Start by finding out how many steps you usually take in a day  Then try to take more steps each day than you took the day before  Tips to help you stay on track:   · Start slowly and work up  You do not have to do 30 minutes of activity at one time  You can break the activity up and do a few minutes at a time  Remember that some physical activity is better than none  Stand up during the day, even if you cannot walk around  Your body uses more energy when you stand  You may be able to get a desk that allows you to stand while you type or make phone calls for work  Aim for a speed or intensity that is challenging but not too difficult  You should be able to speak a few words at a time but not be able to sing  · Plan activities you enjoy  Do a variety of activities so you do not become bored and you stay challenged  Include activities that strengthen your bones  These activities are called weight-bearing exercises  Examples include tennis, jumping rope, and running  Swimming, riding a bike, and similar exercises keep weight off your bones  They will not help strengthen bones, but they will help your heart and lungs work better  · Ask for support from the people in your life  Go for a walk after dinner with your family  Meet friends at the park  Take a break with a coworker and walk around  Find someone who likes to go to the gym at the same time you do   You may be more likely to go if you know another person is counting on you  Get involved in community events, such as cleaning a community park  Ask someone to help you stay on track  For example, you can tell the person about your daily or weekly activity  · Treat yourself to a reward when you reach a goal   The rewards can be for activity done for a certain amount of time each day or days each week  Rewards can also be for progress you make  Have rewards that are not food, such as a new clothing item or book  What you need to know about nutrition and activity:  Healthy foods will give you the energy you need to be active  Activity and good nutrition work together to help you reach or maintain a healthy weight  Healthy foods include fruits, vegetables, lean meats, fish, cooked beans, whole-grain breads, and low-fat dairy products  Your healthcare provider can help you create a healthy meal plan  He or she can tell you how many calories you need to stay active and still lose weight if needed  Follow up with your healthcare provider as directed:  Write down your questions so you remember to ask them during your visits  © Copyright [employer] 2021 Information is for End User's use only and may not be sold, redistributed or otherwise used for commercial purposes  All illustrations and images included in CareNotes® are the copyrighted property of A D [employer] Inc  or [address] [place], [place] [address]coni   The above information is an  only  It is not intended as medical advice for individual conditions or treatments  Talk to your doctor, nurse or pharmacist before following any medical regimen to see if it is safe and effective for you  Dizziness   AMBULATORY CARE:   Dizziness  is a feeling of being off balance or unsteady  Common causes of dizziness are an inner ear fluid imbalance or a lack of oxygen in your blood  Dizziness may be acute (lasts 3 days or less) or chronic (lasts longer than 3 days)   You may have dizzy spells that last from seconds to a few hours  Common symptoms that may happen with dizziness:   · A feeling that your surroundings are moving even though you are standing still    · Ringing in your ears or hearing loss     · Feeling faint or lightheaded     · Weakness or unsteadiness     · Double vision or eye movements you cannot control    · Nausea or vomiting     · Confusion    Seek care immediately if:   · You have a headache and a stiff neck  · You have shaking chills and a fever  · You vomit over and over with no relief  · Your vomit or bowel movements are red or black  · You have pain in your chest, back, or abdomen  · You have numbness, especially in your face, arms, or legs  · You have trouble moving your arms or legs  · You are confused  Contact your healthcare provider if:   · You have a fever  · Your symptoms do not get better with treatment  · You have questions or concerns about your condition or care  Treatment for dizziness  depends on the cause  Your healthcare provider may give you oxygen or medicines to decrease your dizziness and nausea  He may also refer you to a specialist  Shonda Israel may need to be admitted to the hospital for treatment  Manage your symptoms:   · Do not drive  or operate heavy machinery when you are dizzy  · Get up slowly  from sitting or lying down  · Drink plenty of liquids  Liquids help prevent dehydration  Ask how much liquid to drink each day and which liquids are best for you  Follow up with your healthcare provider as directed:  Write down your questions so you remember to ask them during your visits  © [employer] 2021 Information is for End User's use only and may not be sold, redistributed or otherwise used for commercial purposes  All illustrations and images included in CareNotes® are the copyrighted property of [employer]  or Philly [employer]  The above information is an  only   It is not intended as medical advice for individual conditions or treatments  Talk to your doctor, nurse or pharmacist before following any medical regimen to see if it is safe and effective for you

## 2021-09-29 NOTE — ASSESSMENT & PLAN NOTE
Previously started on Lexapro and hydroxyzine - reports that sx have decreased and she would like to continue this   She declines referral for psychiatry or therapy

## 2021-09-29 NOTE — LETTER
September 29, 2021     Patient: Tessy Carreno   YOB: 1975   Date of Visit: 9/29/2021       To Whom it May Concern:    Tessy Carreno was seen in my clinic on 9/29/2021  She may return to work on 10/4/2021  If you have any questions or concerns, please don't hesitate to call           Sincerely,          EVAN See        CC: No Recipients

## 2021-09-29 NOTE — PROGRESS NOTES
Assessment/Plan:    Tobacco use  -patient is not interested in quitting ---continue to encourage cessation  -patient is aware of pharmacotherapy options for aiding in cessation       Asthma  Restart Advair bid, continue albuterol PRN     Choledocholithiasis  She underwent ERCP/ lithotripsy 4/29/2021 and was recommend to have repeat ERCP 6 weeks post procedure but has not   Recommend she follow with GI - referral placed     Anxiety and depression  Previously started on Lexapro and hydroxyzine - reports that sx have decreased and she would like to continue this   She declines referral for psychiatry or therapy     Dizziness  First episode occurred at work several days ago and resolved w/in 10 minutes   2nd episode occurred here in office   Associated w/ nausea and worsened with head turning   Glucose normal, VSS, EKG normal  She is unable to tolerate [employer] testing - however suspect vertigo  Will treat with meclizine   Advised to avoid driving at this time   requested her to update me on sx by Friday, if sx persist would consider vestibular therapy referral     Reshma Finch was seen today for anxiety  Diagnoses and all orders for this visit:    Dizziness  -     POCT blood glucose  -     XR chest pa & lateral; Future  -     POCT ECG  -     meclizine (ANTIVERT) 25 mg tablet; Take 1 tablet (25 mg total) by mouth 3 (three) times a day as needed for dizziness    Mild persistent asthma without complication  -     fluticasone-salmeterol (Advair Diskus) 100-50 mcg/dose inhaler; Inhale 1 puff 2 (two) times a day Rinse mouth after use  Anxiety and depression  -     hydrOXYzine HCL (ATARAX) 25 mg tablet; Take 1 tablet (25 mg total) by mouth 3 (three) times a day as needed for anxiety  -     escitalopram (LEXAPRO) 10 mg tablet; Take 1 tablet (10 mg total) by mouth daily    Cervical cancer screening  -     Ambulatory referral to Obstetrics / Gynecology;  Future    Tobacco use    Choledocholithiasis  -     Ambulatory referral to Gastroenterology; Future        Return in about 6 weeks (around 11/10/2021) for anxiety  Patient Instructions     Benefits of an Active Lifestyle   AMBULATORY CARE:   An active lifestyle  means you do physical activity throughout the day  Any activity that gets you up and moving is part of an active lifestyle  Physical activity includes exercise such as walking or lifting weights  It also includes playing sports  Physical activity is different from other kinds of activity, such as reading a book  This kind of activity is called sedentary  A sedentary lifestyle means you sit or do not move much during the day  An active lifestyle has many benefits, such as helping you prevent or manage health conditions  Call your doctor if:   · You notice changes in your health, such as new or worsening shortness of breath  · You have questions or concerns about your condition or care  Benefits of an active lifestyle:   · You may be able to do daily activities more easily  Activity helps condition your heart, lungs, and muscles  This can help you get through your daily activities without feeling tired  · You can help control your weight  Activity helps your body use the calories you eat instead of storing them as fat  Your body continues to burn calories at a higher rate after you are active  · Activity can increase your health  Activity helps lower your risk for cancer, heart disease, diabetes, and stroke  Activity can help you control your blood pressure and blood sugar levels, and lower your cholesterol  If you have arthritis, activity can help your joints move more easily and with less pain  · Your bones and muscles will get stronger  This will help prevent osteoporosis and reduce your risk for falls  · Activity can help improve your mood  Activity can reduce or prevent depression and stress  Activity can also help improve your sleep      Risks of a sedentary lifestyle:  A sedentary lifestyle increases your risk for diseases such as diabetes, high blood pressure, and heart disease  Your immune system also becomes weaker  This means it cannot fight infections well  How much activity you need:  Any activity is better than no activity at all  When you go from being mostly inactive to adding some activity, you will see health benefits  The following are general guidelines:  · Do aerobic activity several days each week  Aerobic activity includes walking, bicycling, dancing, swimming, and raking leaves  Aim for 150 to 300 minutes of moderate activity, or 75 to 150 of vigorous activity each week  You can also do a combination of moderate and vigorous activity  · Do strength training at least 2 times each week  Strength training helps you keep the muscles you have and build new muscles  Strength training includes pushups, yoga, mauricio chi, and weightlifting  If you do not have access to weights, you can lift items around your house  Try to work all the major muscle groups, such as your legs, arms, abdomen, and chest  Do 2 or 3 sets on each area  Use a weight that is slightly heavier than you can lift easily  You can work up to [employer]  You can also use resistance bands instead of weights  Steps you can take to become more active:   · Set goals  Set some long-term goals and some short-term goals  For example, you may want to be able to walk for 30 minutes without becoming short of breath  Try not to put time requirements on your goals  For example, do not think you should reach your goal in a month  Set smaller goals, such as walking a little longer each week, or feeling less shortness of breath  · Be active all day  Activity does not have to mean structured exercise each day  You can be more active by making small changes all day  For example, try parking as far from the entrance of buildings as you can when you run errands  If possible, walk or ride a bike instead of driving   Take the stairs instead of the elevator  · Keep a record of your activity and your progress  You can do this by writing down your daily activity  Include the kind of activity and how long you did it  You can also use a program on your phone or other device that will track activity for you  Also record your progress  You may be doing daily activities more easily, sleeping better, or building muscles  · Step counting can help you monitor activity  A general guide is to take 10,000 steps each day  A pedometer is a device you can wear to track your steps  Some phones have programs that will count and record steps  You may need to work up to 10,000 steps  Start by finding out how many steps you usually take in a day  Then try to take more steps each day than you took the day before  Tips to help you stay on track:   · Start slowly and work up  You do not have to do 30 minutes of activity at one time  You can break the activity up and do a few minutes at a time  Remember that some physical activity is better than none  Stand up during the day, even if you cannot walk around  Your body uses more energy when you stand  You may be able to get a desk that allows you to stand while you type or make phone calls for work  Aim for a speed or intensity that is challenging but not too difficult  You should be able to speak a few words at a time but not be able to sing  · Plan activities you enjoy  Do a variety of activities so you do not become bored and you stay challenged  Include activities that strengthen your bones  These activities are called weight-bearing exercises  Examples include tennis, jumping rope, and running  Swimming, riding a bike, and similar exercises keep weight off your bones  They will not help strengthen bones, but they will help your heart and lungs work better  · Ask for support from the people in your life  Go for a walk after dinner with your family  Meet friends at the park   Take a break with a coworker and walk around  Find someone who likes to go to the gym at the same time you do  You may be more likely to go if you know another person is counting on you  Get involved in community events, such as cleaning a community park  Ask someone to help you stay on track  For example, you can tell the person about your daily or weekly activity  · Treat yourself to a reward when you reach a goal   The rewards can be for activity done for a certain amount of time each day or days each week  Rewards can also be for progress you make  Have rewards that are not food, such as a new clothing item or book  What you need to know about nutrition and activity:  Healthy foods will give you the energy you need to be active  Activity and good nutrition work together to help you reach or maintain a healthy weight  Healthy foods include fruits, vegetables, lean meats, fish, cooked beans, whole-grain breads, and low-fat dairy products  Your healthcare provider can help you create a healthy meal plan  He or she can tell you how many calories you need to stay active and still lose weight if needed  Follow up with your healthcare provider as directed:  Write down your questions so you remember to ask them during your visits  © Copyright [employer] 2021 Information is for End User's use only and may not be sold, redistributed or otherwise used for commercial purposes  All illustrations and images included in CareNotes® are the copyrighted property of [employer]  or [employer]   The above information is an  only  It is not intended as medical advice for individual conditions or treatments  Talk to your doctor, nurse or pharmacist before following any medical regimen to see if it is safe and effective for you  Dizziness   AMBULATORY CARE:   Dizziness  is a feeling of being off balance or unsteady   Common causes of dizziness are an inner ear fluid imbalance or a lack of oxygen in your blood  Dizziness may be acute (lasts 3 days or less) or chronic (lasts longer than 3 days)  You may have dizzy spells that last from seconds to a few hours  Common symptoms that may happen with dizziness:   · A feeling that your surroundings are moving even though you are standing still    · Ringing in your ears or hearing loss     · Feeling faint or lightheaded     · Weakness or unsteadiness     · Double vision or eye movements you cannot control    · Nausea or vomiting     · Confusion    Seek care immediately if:   · You have a headache and a stiff neck  · You have shaking chills and a fever  · You vomit over and over with no relief  · Your vomit or bowel movements are red or black  · You have pain in your chest, back, or abdomen  · You have numbness, especially in your face, arms, or legs  · You have trouble moving your arms or legs  · You are confused  Contact your healthcare provider if:   · You have a fever  · Your symptoms do not get better with treatment  · You have questions or concerns about your condition or care  Treatment for dizziness  depends on the cause  Your healthcare provider may give you oxygen or medicines to decrease your dizziness and nausea  He may also refer you to a specialist  Leann Lower may need to be admitted to the hospital for treatment  Manage your symptoms:   · Do not drive  or operate heavy machinery when you are dizzy  · Get up slowly  from sitting or lying down  · Drink plenty of liquids  Liquids help prevent dehydration  Ask how much liquid to drink each day and which liquids are best for you  Follow up with your healthcare provider as directed:  Write down your questions so you remember to ask them during your visits  © Copyright [employer] 2021 Information is for End User's use only and may not be sold, redistributed or otherwise used for commercial purposes   All illustrations and images included in CareNotes® are the copyrighted property of [employer]  or [address]   The above information is an  only  It is not intended as medical advice for individual conditions or treatments  Talk to your doctor, nurse or pharmacist before following any medical regimen to see if it is safe and effective for you  Subjective:     Sheridan Kirkland is a 40 y o  female who  has a past medical history of Asthma and Gallstone  who presented to the office today for follow up  At previous visit several weeks ago patient was started on Lexapro and Atarax for r/o anxiety  Reports that she is doing better with medications  Does note 2 episodes of feeling "woozy", like the room is spinning  Previous episode lasted about 10 minutes when she was at work  2nd episode just happened while 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  She denies any shortness of breath, chest pain, diaphoresis  The following portions of the patient's history were reviewed and updated as appropriate: allergies, current medications, past family history, past medical history, past social history, past surgical history and problem list     Current Outpatient Medications on File Prior to Visit   Medication Sig Dispense Refill    albuterol (Ventolin HFA) 90 mcg/act inhaler Inhale 2 puffs every 6 (six) hours as needed for wheezing 18 g 5    [DISCONTINUED] escitalopram (LEXAPRO) 10 mg tablet TAKE 1 TABLET BY MOUTH EVERY DAY 30 tablet 0    [DISCONTINUED] hydrOXYzine HCL (ATARAX) 25 mg tablet Take 1 tablet (25 mg total) by mouth 3 (three) times a day as needed for anxiety 40 tablet 0    [DISCONTINUED] pantoprazole (PROTONIX) 40 mg tablet Take 1 tablet (40 mg total) by mouth daily (Patient not taking: Reported on 9/1/2021) 30 tablet 0     No current facility-administered medications on file prior to visit  Review of Systems   Constitutional: Negative for chills and fever  HENT: Negative for ear pain and sore throat  Eyes: Negative for pain and visual disturbance  Respiratory: Negative for cough and shortness of breath  Cardiovascular: Negative for chest pain and palpitations  Gastrointestinal: Positive for nausea  Negative for abdominal pain and vomiting  Genitourinary: Negative for dysuria and hematuria  Musculoskeletal: Negative for arthralgias and back pain  Skin: Negative for color change and rash  Neurological: Positive for dizziness  Negative for seizures and syncope  All other systems reviewed and are negative  Objective:    /80 (BP Location: Left arm, Patient Position: Sitting, Cuff Size: Adult)   Pulse 79   Temp 97 6 °F (36 4 °C) (Temporal)   Resp 18   Wt 66 7 kg (147 lb)   LMP 09/29/2021   SpO2 99%   BMI 26 89 kg/m²     Physical Exam  Vitals and nursing note reviewed  Constitutional:       General: She is not in acute distress  Appearance: She is well-developed  She is not ill-appearing or diaphoretic  HENT:      Head: Normocephalic and atraumatic  Right Ear: External ear normal       Left Ear: External ear normal    Eyes:      Extraocular Movements: Extraocular movements intact  Conjunctiva/sclera: Conjunctivae normal       Pupils: Pupils are equal, round, and reactive to light  Cardiovascular:      Rate and Rhythm: Normal rate and regular rhythm  Heart sounds: Normal heart sounds  No murmur heard  Pulmonary:      Effort: Pulmonary effort is normal  No respiratory distress  Breath sounds: Normal breath sounds  No wheezing  Abdominal:      General: Bowel sounds are normal  There is no distension  Palpations: Abdomen is soft  Tenderness: There is no abdominal tenderness  There is no rebound  Musculoskeletal:         General: No deformity  Normal range of motion  Cervical back: Normal range of motion and neck supple  Lymphadenopathy:      Cervical: No cervical adenopathy  Skin:     General: Skin is warm and dry  Capillary Refill: Capillary refill takes less than 2 seconds  Findings: No rash  Neurological:      Mental Status: She is alert and oriented to person, place, and time  Cranial Nerves: No cranial nerve deficit  Sensory: No sensory deficit  Motor: No weakness        Coordination: Coordination normal       Gait: Gait normal       Deep Tendon Reflexes: Reflexes normal    Psychiatric:         Behavior: Behavior normal          EVAN Vital  09/29/21  10:20 AM

## 2021-09-29 NOTE — ASSESSMENT & PLAN NOTE
First episode occurred at work several days ago and resolved w/in 10 minutes   2nd episode occurred here in office   Associated w/ nausea and worsened with head turning   Glucose normal, VSS, EKG normal  She is unable to tolerate [employer] testing - however suspect vertigo  Will treat with meclizine   Advised to avoid driving at this time   requested her to update me on sx by Friday, if sx persist would consider vestibular therapy referral

## 2021-11-09 ENCOUNTER — TELEPHONE (OUTPATIENT)
Dept: OTHER | Facility: OTHER | Age: 46
End: 2021-11-09

## 2021-11-10 ENCOUNTER — OFFICE VISIT (OUTPATIENT)
Dept: FAMILY MEDICINE CLINIC | Facility: CLINIC | Age: 46
End: 2021-11-10

## 2021-11-10 ENCOUNTER — APPOINTMENT (OUTPATIENT)
Dept: LAB | Facility: CLINIC | Age: 46
End: 2021-11-10
Payer: COMMERCIAL

## 2021-11-10 ENCOUNTER — HOSPITAL ENCOUNTER (OUTPATIENT)
Dept: MAMMOGRAPHY | Facility: CLINIC | Age: 46
Discharge: HOME | End: 2021-11-10
Payer: COMMERCIAL

## 2021-11-10 VITALS
TEMPERATURE: 96.2 F | DIASTOLIC BLOOD PRESSURE: 84 MMHG | SYSTOLIC BLOOD PRESSURE: 128 MMHG | WEIGHT: 149 LBS | HEART RATE: 86 BPM | RESPIRATION RATE: 18 BRPM | HEIGHT: 62 IN | BODY MASS INDEX: 27.42 KG/M2 | OXYGEN SATURATION: 99 %

## 2021-11-10 DIAGNOSIS — R20.0 NUMBNESS AND TINGLING IN BOTH HANDS: ICD-10-CM

## 2021-11-10 DIAGNOSIS — R20.2 NUMBNESS AND TINGLING IN BOTH HANDS: ICD-10-CM

## 2021-11-10 DIAGNOSIS — R20.0 NUMBNESS AND TINGLING IN BOTH HANDS: Primary | ICD-10-CM

## 2021-11-10 DIAGNOSIS — J45.30 MILD PERSISTENT ASTHMA WITHOUT COMPLICATION: ICD-10-CM

## 2021-11-10 DIAGNOSIS — Z12.31 ENCOUNTER FOR SCREENING MAMMOGRAM FOR MALIGNANT NEOPLASM OF BREAST: ICD-10-CM

## 2021-11-10 DIAGNOSIS — R20.2 NUMBNESS AND TINGLING IN BOTH HANDS: Primary | ICD-10-CM

## 2021-11-10 DIAGNOSIS — F32.A ANXIETY AND DEPRESSION: ICD-10-CM

## 2021-11-10 DIAGNOSIS — F41.9 ANXIETY AND DEPRESSION: ICD-10-CM

## 2021-11-10 LAB
FOLATE SERPL-MCNC: 4.4 NG/ML (ref 3.1–17.5)
VIT B12 SERPL-MCNC: 269 PG/ML (ref 100–900)

## 2021-11-10 PROCEDURE — 36415 COLL VENOUS BLD VENIPUNCTURE: CPT

## 2021-11-10 PROCEDURE — 82746 ASSAY OF FOLIC ACID SERUM: CPT

## 2021-11-10 PROCEDURE — 99214 OFFICE O/P EST MOD 30 MIN: CPT | Performed by: NURSE PRACTITIONER

## 2021-11-10 PROCEDURE — 77067 SCR MAMMO BI INCL CAD: CPT

## 2021-11-10 PROCEDURE — 82607 VITAMIN B-12: CPT

## 2021-11-10 PROCEDURE — 77063 BREAST TOMOSYNTHESIS BI: CPT

## 2021-11-10 RX ORDER — METHYLPREDNISOLONE 4 MG/1
TABLET ORAL
Qty: 21 EACH | Refills: 0 | Status: SHIPPED | OUTPATIENT
Start: 2021-11-10 | End: 2022-04-25 | Stop reason: ALTCHOICE

## 2021-12-08 ENCOUNTER — HOSPITAL ENCOUNTER (OUTPATIENT)
Dept: ULTRASOUND IMAGING | Facility: CLINIC | Age: 46
Discharge: HOME | End: 2021-12-08
Payer: COMMERCIAL

## 2021-12-08 DIAGNOSIS — R92.8 ABNORMAL MAMMOGRAM: ICD-10-CM

## 2021-12-08 PROCEDURE — 76642 ULTRASOUND BREAST LIMITED: CPT

## 2021-12-27 ENCOUNTER — OFFICE VISIT (OUTPATIENT)
Dept: GASTROENTEROLOGY | Facility: MEDICAL CENTER | Age: 46
End: 2021-12-27
Payer: COMMERCIAL

## 2021-12-27 VITALS
BODY MASS INDEX: 26.96 KG/M2 | HEART RATE: 76 BPM | TEMPERATURE: 96.5 F | SYSTOLIC BLOOD PRESSURE: 128 MMHG | WEIGHT: 147.4 LBS | DIASTOLIC BLOOD PRESSURE: 86 MMHG

## 2021-12-27 DIAGNOSIS — Z12.11 COLON CANCER SCREENING: Primary | ICD-10-CM

## 2021-12-27 DIAGNOSIS — K80.50 CHOLEDOCHOLITHIASIS: ICD-10-CM

## 2021-12-27 PROCEDURE — 99214 OFFICE O/P EST MOD 30 MIN: CPT | Performed by: PHYSICIAN ASSISTANT

## 2022-02-22 ENCOUNTER — TELEPHONE (OUTPATIENT)
Dept: GASTROENTEROLOGY | Facility: HOSPITAL | Age: 47
End: 2022-02-22

## 2022-04-25 ENCOUNTER — OFFICE VISIT (OUTPATIENT)
Dept: FAMILY MEDICINE CLINIC | Facility: CLINIC | Age: 47
End: 2022-04-25

## 2022-04-25 ENCOUNTER — HOSPITAL ENCOUNTER (OUTPATIENT)
Dept: RADIOLOGY | Facility: HOSPITAL | Age: 47
Discharge: HOME | End: 2022-04-25
Payer: COMMERCIAL

## 2022-04-25 ENCOUNTER — TELEPHONE (OUTPATIENT)
Dept: GASTROENTEROLOGY | Facility: CLINIC | Age: 47
End: 2022-04-25

## 2022-04-25 ENCOUNTER — APPOINTMENT (OUTPATIENT)
Dept: LAB | Facility: HOSPITAL | Age: 47
End: 2022-04-25
Payer: COMMERCIAL

## 2022-04-25 VITALS
TEMPERATURE: 97.2 F | HEART RATE: 100 BPM | OXYGEN SATURATION: 99 % | BODY MASS INDEX: 26.13 KG/M2 | WEIGHT: 142 LBS | RESPIRATION RATE: 16 BRPM | HEIGHT: 62 IN | SYSTOLIC BLOOD PRESSURE: 130 MMHG | DIASTOLIC BLOOD PRESSURE: 80 MMHG

## 2022-04-25 DIAGNOSIS — M54.50 ACUTE BILATERAL LOW BACK PAIN WITHOUT SCIATICA: ICD-10-CM

## 2022-04-25 DIAGNOSIS — M79.602 PARESTHESIA AND PAIN OF BOTH UPPER EXTREMITIES: ICD-10-CM

## 2022-04-25 DIAGNOSIS — Z02.1 PRE-EMPLOYMENT EXAMINATION: ICD-10-CM

## 2022-04-25 DIAGNOSIS — Z11.3 SCREENING FOR STD (SEXUALLY TRANSMITTED DISEASE): ICD-10-CM

## 2022-04-25 DIAGNOSIS — Z11.1 TUBERCULOSIS SCREENING: ICD-10-CM

## 2022-04-25 DIAGNOSIS — E53.8 LOW VITAMIN B12 LEVEL: ICD-10-CM

## 2022-04-25 DIAGNOSIS — R20.2 PARESTHESIA AND PAIN OF BOTH UPPER EXTREMITIES: ICD-10-CM

## 2022-04-25 DIAGNOSIS — K80.50 CHOLEDOCHOLITHIASIS: ICD-10-CM

## 2022-04-25 DIAGNOSIS — M79.601 PARESTHESIA AND PAIN OF BOTH UPPER EXTREMITIES: ICD-10-CM

## 2022-04-25 DIAGNOSIS — J45.30 MILD PERSISTENT ASTHMA WITHOUT COMPLICATION: Primary | ICD-10-CM

## 2022-04-25 LAB
ALBUMIN SERPL BCP-MCNC: 4.4 G/DL (ref 3–5.2)
ALP SERPL-CCNC: 82 U/L (ref 43–122)
ALT SERPL W P-5'-P-CCNC: 15 U/L
ANION GAP SERPL CALCULATED.3IONS-SCNC: 6 MMOL/L (ref 5–14)
AST SERPL W P-5'-P-CCNC: 32 U/L (ref 14–36)
BASOPHILS # BLD AUTO: 0.02 THOUSANDS/ΜL (ref 0–0.1)
BASOPHILS NFR BLD AUTO: 0 % (ref 0–1)
BILIRUB SERPL-MCNC: 0.61 MG/DL
BUN SERPL-MCNC: 13 MG/DL (ref 5–25)
CALCIUM SERPL-MCNC: 9.2 MG/DL (ref 8.4–10.2)
CHLORIDE SERPL-SCNC: 103 MMOL/L (ref 97–108)
CO2 SERPL-SCNC: 31 MMOL/L (ref 22–30)
CREAT SERPL-MCNC: 0.92 MG/DL (ref 0.6–1.2)
EOSINOPHIL # BLD AUTO: 0.2 THOUSAND/ΜL (ref 0–0.61)
EOSINOPHIL NFR BLD AUTO: 3 % (ref 0–6)
ERYTHROCYTE [DISTWIDTH] IN BLOOD BY AUTOMATED COUNT: 12.9 % (ref 11.6–15.1)
GFR SERPL CREATININE-BSD FRML MDRD: 74 ML/MIN/1.73SQ M
GLUCOSE P FAST SERPL-MCNC: 63 MG/DL (ref 70–99)
HCT VFR BLD AUTO: 47.2 % (ref 34.8–46.1)
HGB BLD-MCNC: 15.3 G/DL (ref 11.5–15.4)
IMM GRANULOCYTES # BLD AUTO: 0.02 THOUSAND/UL (ref 0–0.2)
IMM GRANULOCYTES NFR BLD AUTO: 0 % (ref 0–2)
LYMPHOCYTES # BLD AUTO: 1.96 THOUSANDS/ΜL (ref 0.6–4.47)
LYMPHOCYTES NFR BLD AUTO: 26 % (ref 14–44)
MCH RBC QN AUTO: 32.3 PG (ref 26.8–34.3)
MCHC RBC AUTO-ENTMCNC: 32.4 G/DL (ref 31.4–37.4)
MCV RBC AUTO: 100 FL (ref 82–98)
MONOCYTES # BLD AUTO: 0.61 THOUSAND/ΜL (ref 0.17–1.22)
MONOCYTES NFR BLD AUTO: 8 % (ref 4–12)
NEUTROPHILS # BLD AUTO: 4.79 THOUSANDS/ΜL (ref 1.85–7.62)
NEUTS SEG NFR BLD AUTO: 63 % (ref 43–75)
NRBC BLD AUTO-RTO: 0 /100 WBCS
PLATELET # BLD AUTO: 332 THOUSANDS/UL (ref 149–390)
PMV BLD AUTO: 10.1 FL (ref 8.9–12.7)
POTASSIUM SERPL-SCNC: 4.2 MMOL/L (ref 3.6–5)
PROT SERPL-MCNC: 8.3 G/DL (ref 5.9–8.4)
RBC # BLD AUTO: 4.74 MILLION/UL (ref 3.81–5.12)
SODIUM SERPL-SCNC: 140 MMOL/L (ref 137–147)
VIT B12 SERPL-MCNC: 433 PG/ML (ref 100–900)
WBC # BLD AUTO: 7.6 THOUSAND/UL (ref 4.31–10.16)

## 2022-04-25 PROCEDURE — 71046 X-RAY EXAM CHEST 2 VIEWS: CPT

## 2022-04-25 PROCEDURE — 99214 OFFICE O/P EST MOD 30 MIN: CPT

## 2022-04-25 PROCEDURE — 87491 CHLMYD TRACH DNA AMP PROBE: CPT

## 2022-04-25 PROCEDURE — 85025 COMPLETE CBC W/AUTO DIFF WBC: CPT

## 2022-04-25 PROCEDURE — 86592 SYPHILIS TEST NON-TREP QUAL: CPT

## 2022-04-25 PROCEDURE — 87389 HIV-1 AG W/HIV-1&-2 AB AG IA: CPT

## 2022-04-25 PROCEDURE — 36415 COLL VENOUS BLD VENIPUNCTURE: CPT

## 2022-04-25 PROCEDURE — 87591 N.GONORRHOEAE DNA AMP PROB: CPT

## 2022-04-25 PROCEDURE — 80053 COMPREHEN METABOLIC PANEL: CPT

## 2022-04-25 PROCEDURE — 82607 VITAMIN B-12: CPT

## 2022-04-25 RX ORDER — NAPROXEN SODIUM 220 MG
220 TABLET ORAL 2 TIMES DAILY WITH MEALS
Qty: 60 TABLET | Refills: 1 | Status: SHIPPED | OUTPATIENT
Start: 2022-04-25

## 2022-04-25 RX ORDER — ALBUTEROL SULFATE 90 UG/1
2 AEROSOL, METERED RESPIRATORY (INHALATION) EVERY 6 HOURS PRN
Qty: 18 G | Refills: 5 | Status: SHIPPED | OUTPATIENT
Start: 2022-04-25

## 2022-04-25 RX ORDER — NAPROXEN 500 MG/1
500 TABLET ORAL 2 TIMES DAILY WITH MEALS
Qty: 30 TABLET | Refills: 1 | Status: SHIPPED | OUTPATIENT
Start: 2022-04-25 | End: 2022-04-25 | Stop reason: CLARIF

## 2022-04-25 NOTE — TELEPHONE ENCOUNTER
Patients GI provider:  Dr Brooks Almaraz    Number to return call: ([phone])    Reason for call: Pt calling to reschedule her colonoscopy and ERCP she cancelled in February  Please call the above number, her number isn't working properly  Thank you!      Scheduled procedure/appointment date if applicable: Apt/procedure

## 2022-04-25 NOTE — ASSESSMENT & PLAN NOTE
Pt reports bilateral acute low back pain for last few weeks since taking care of significant other and lifting more   Recommended heating pad, massage, Naproxen BID PRN, information on low back stretching exercises provided in AVS

## 2022-04-25 NOTE — ASSESSMENT & PLAN NOTE
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

## 2022-04-25 NOTE — ASSESSMENT & PLAN NOTE
Pt continues to take Lexapro and Atarax  Pt reports high level of stress since leaving her  of 24 years in February  Patient is not interested in speaking to a therapist at this time, reports she has a new boyfriend and a friend she can speak with  Continue current plan

## 2022-04-25 NOTE — PROGRESS NOTES
Assessment/Plan:    Choledocholithiasis  GI appt cancelled and not rescheduled by pt  GI referral renewed  Asthma  Pt reports running out of Advair about 1 month ago  Has been using albuterol at least daily for wheezing since that time  She reports Advair was effective, required use of albuterol less frequently when using Advair consistently  Pt continues to smoke 1/2 to 1 pack cigarettes daily  Counseled regarding smoking reduction/cessation  Refilled Advair and albuterol  Tobacco use  Patient is not ready to quit at this time but states she is down to 1/2 to 1 ppd  Counseled regarding smoking cessation  Anxiety and depression  Pt continues to take Lexapro and Atarax  Pt reports high level of stress since leaving her  of 24 years in February  Patient is not interested in speaking to a therapist at this time, reports she has a new boyfriend and a friend she can speak with  Continue current plan  Numbness and tingling in both hands  Tingling and pain continues in bilateral hands, left worse than right, affecting all fingers  Feet not affected  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  Pt did not obtain hand splints ordered at last appt  Provided with script for splints and educated regarding their use  Pt reports taking OTC B12, unknown dose  Repeat B12, CBC, CMP  Acute bilateral low back pain without sciatica  Pt reports bilateral acute low back pain for last few weeks since taking care of significant other and lifting more  Recommended heating pad, massage, Naproxen BID PRN, information on low back stretching exercises provided in AVS       Diagnoses and all orders for this visit:    Mild persistent asthma without complication  -     albuterol (Ventolin HFA) 90 mcg/act inhaler; Inhale 2 puffs every 6 (six) hours as needed for wheezing  -     fluticasone-salmeterol (Advair Diskus) 100-50 mcg/dose inhaler; Inhale 1 puff 2 (two) times a day Rinse mouth after use      Pre-employment examination  -     XR chest pa & lateral; Future    Tuberculosis screening  -     XR chest pa & lateral; Future    Low vitamin B12 level  -     Vitamin B12; Future    Paresthesia and pain of both upper extremities  -     Vitamin B12; Future  -     CBC and differential; Future  -     Comprehensive metabolic panel; Future    Screening for STD (sexually transmitted disease)  -     HIV 1/2 Antigen/Antibody (4th Generation) w Reflex [facility]; Future  -     RPR; Future  -     Chlamydia/GC amplified DNA by PCR; Future  -  Hepatitis C antibody    Acute bilateral low back pain without sciatica  -     naproxen sodium (ALEVE) 220 MG tablet; Take 1 tablet (220 mg total) by mouth 2 (two) times a day with meals    Choledocholithiasis  -     Ambulatory Referral to Gastroenterology; Future        Subjective:      Patient ID: Kajal Currie is a 56 y o  female  HPI     Woody Idler presents to office for asthma follow up, B12 injection, and to request chest XR for new job  Pt reports daily wheezing necessitating use of rescue inhaler at least once daily  She states she ran out of Advair about 1 month ago  She reports Advair was effective, required use of albuterol less frequently when using Advair consistently  Continues to smoke 1/2 to 1 ppd  Pt wanted to start B12 injections as recommended for paresthesia bilateral hands and low B12 level in November  Has been taking OTC B12 unknown dose since that time  Paresthesia continues without improvement  Feet not affected  Pt has not tried any other treatments  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 and requires a chest XR for TB screening  Pt has history of positive PPD and reports year of treatment  No S/S of TB at this time except weight loss, which pt attributes to stress  Pt currently works at [employer] but hours have been cut, is hoping new position as home health aid can be primary/only job  Also has job interview at [employer] later this morning  Ruth Em tearfully reported that she left her  of 25 years in February and is struggling with the separation due to continued contact from ex  Pt denies having any concerns for her safety  She has a new significant other and requested testing for STIs due to ex-'s infidelity and having a new partner herself  Pt states she was supposed to follow up with GI but was unable to do so  She now has transportation to the [facility] and would like to pursue follow up  The following portions of the patient's history were reviewed and updated as appropriate: allergies, current medications, past family history, past medical history, past social history, past surgical history and problem list     Review of Systems   Constitutional: Positive for unexpected weight change  Negative for activity change, fatigue and fever  Respiratory: Positive for cough, chest tightness and wheezing  Cardiovascular: Negative for chest pain and palpitations  Gastrointestinal: Positive for nausea  Negative for abdominal pain and vomiting  Musculoskeletal: Positive for back pain and myalgias  Neurological: Positive for dizziness and numbness (tingling/pain/weakness bl hands and fingers)  Psychiatric/Behavioral: Positive for dysphoric mood  The patient is nervous/anxious  Objective:      /80 (BP Location: Left arm, Patient Position: Sitting, Cuff Size: Standard)   Pulse 100   Temp (!) 97 2 °F (36 2 °C) (Temporal)   Resp 16   Ht 5' 2" (1 575 m)   Wt 64 4 kg (142 lb)   LMP  (LMP Unknown)   SpO2 99%   Breastfeeding No   BMI 25 97 kg/m²        Physical Exam  Vitals reviewed  Constitutional:       General: She is not in acute distress  Appearance: She is not ill-appearing  HENT:      Head: Normocephalic and atraumatic  Cardiovascular:      Rate and Rhythm: Normal rate and regular rhythm  Heart sounds: Normal heart sounds     Pulmonary:      Effort: Pulmonary effort is normal       Breath sounds: Normal breath sounds  No wheezing  Musculoskeletal:      Cervical back: Normal       Thoracic back: Normal       Lumbar back: Tenderness (bilateral) present  Skin:     General: Skin is warm and dry  Neurological:      Mental Status: She is alert and oriented to person, place, and time  Psychiatric:         Mood and Affect: Mood is anxious  Affect is tearful  Speech: Speech normal          Thought Content:  Thought content normal

## 2022-04-25 NOTE — ASSESSMENT & PLAN NOTE
Pt reports running out of Advair about 1 month ago  Has been using albuterol at least daily for wheezing since that time  She reports Advair was effective, required use of albuterol less frequently when using Advair consistently  Pt continues to smoke 1/2 to 1 pack cigarettes daily  Counseled regarding smoking reduction/cessation  Refilled Advair and albuterol

## 2022-04-25 NOTE — PATIENT INSTRUCTIONS
How to Use a Dry-Powder Inhaler   WHAT YOU NEED TO KNOW:   A dry-powder inhaler is a handheld device that delivers a dose of medicine as a powder when you inhale  You breathe the medicine deep into your lungs to open your airways  DISCHARGE INSTRUCTIONS:   How to use a dry-powder inhaler:  Some dry-powder inhalers require you to place the medicine inside the inhaler  Others come with the medicine already inside  · Follow the instructions to put together your inhaler, and get it ready to use  Open the inhaler mouthpiece, or remove the cap  · Check to make sure there are no foreign objects in the mouthpiece  · Breathe out fully, away from the inhaler  Never  exhale into your inhaler  · Hold the inhaler as directed  Do not cover the vents  Place the mouthpiece between your lips  · Breathe in strongly for as long as you can to make the medicine come out  · Hold your breath for about 10 seconds  Breathe out slowly, away from the inhaler  · Make sure the counter has counted a dose, if your inhaler has a counter  If your inhaler uses capsules, check the capsule to make sure it is empty  · Repeat if more puffs are needed  · Rinse your mouth with water after you use your inhaler, as directed by your healthcare provider or specialist  Do not swallow the rinse water  Care for your inhaler properly:  Close the mouthpiece or replace the cap after each use  Store the inhaler in a cool, dry place  Clean the inhaler at least once a week with a dry cloth, as directed  Follow up with your healthcare provider or specialist as directed:  Bring your inhaler to all of your visits  You may be asked to use your inhaler at these visits so your healthcare provider or specialist can make sure you are using it correctly  Write down your questions, so you remember to ask them during your visits  Contact your healthcare provider or specialist if:   · Your inhaler does not work properly      · You cannot inhale strongly enough to make the medicine come out of the inhaler  · Your medicine is not controlling your symptoms  · The medicine irritates your mouth or throat, or it causes you to sound hoarse or lose your voice  · It feels like most of the medicine lands in your mouth or throat  · You run out of medicine before your next refill is due, or sooner than your healthcare provider or specialist says you should  · You have questions or concerns about your condition or care  Seek care immediately if:   · Your lips or nails turn blue or gray  · The skin between your ribs or around your neck pulls in with every breath  · You feel short of breath, even after you use your inhaler  © Copyright [employer] 2022 Information is for End User's use only and may not be sold, redistributed or otherwise used for commercial purposes  All illustrations and images included in CareNotes® are the copyrighted property of A D A M , Inc  or I-MD Beau Mnae   The above information is an  only  It is not intended as medical advice for individual conditions or treatments  Talk to your doctor, nurse or pharmacist before following any medical regimen to see if it is safe and effective for you  Lower Back Exercises   AMBULATORY CARE:   Lower back exercises  help heal and strengthen your back muscles to prevent another injury  Ask your healthcare provider if you need to see a physical therapist for more advanced exercises  Seek care immediately if:   · You have severe pain that prevents you from moving  Contact your healthcare provider if:   · Your pain becomes worse  · You have new pain  · You have questions or concerns about your condition or care  Do lower back exercises safely:   · Do the exercises on a mat or firm surface  (not on a bed) to support your spine and prevent low back pain  · Move slowly and smoothly  Avoid fast or jerky motions  · Breathe normally    Do not hold your breath  · Stop if you feel pain  It is normal to feel some discomfort at first  Regular exercise will help decrease your discomfort over time  Lower back exercises: Your healthcare provider may recommend that you do back exercises 10 to 30 minutes each day  He may also recommend that you do exercises 1 to 3 times each day  Ask your healthcare provider which exercises are best for you and how often to do them  · Ankle pumps:  Lie on your back  Move your foot up (with your toes pointing toward your head)  Then, move your foot down (with your toes pointing away from you)  Repeat this exercise 10 times on each side  · Heel slides:  Lie on your back  Slowly bend one leg and then straighten it  Next, bend the other leg and then straighten it  Repeat 10 times on each side  · Pelvic tilt:  Lie on your back with your knees bent and feet flat on the floor  Place your arms in a relaxed position beside your body  Tighten the muscles of your abdomen and flatten your back against the floor  Hold for 5 seconds  Repeat 5 times  · Back stretch:  Lie on your back with your hands behind your head  Bend your knees and turn the lower half of your body to one side  Hold this position for 10 seconds  Repeat 3 times on each side  · Straight leg raises:  Lie on your back with one leg straight  Bend the other knee  Tighten your abdomen and then slowly lift the straight leg up about 6 to 12 inches off the floor  Hold for 1 to 5 seconds  Lower your leg slowly  Repeat 10 times on each leg  · Knee-to-chest:  Lie on your back with your knees bent and feet flat on the floor  Pull one of your knees toward your chest and hold it there for 5 seconds  Return your leg to the starting position  Lift the other knee toward your chest and hold for 5 seconds  Do this 5 times on each side  · Cat and camel:  Place your hands and knees on the floor   Arch your back upward toward the ceiling and lower your head  Round out your spine as much as you can  Hold for 5 seconds  Lift your head upward and push your chest downward toward the floor  Hold for 5 seconds  Do 3 sets or as directed  · Wall squats:  Stand with your back against a wall  Tighten the muscles of your abdomen  Slowly lower your body until your knees are bent at a 45 degree angle  Hold this position for 5 seconds  Slowly move back up to a standing position  Repeat 10 times  · Curl up:  Lie on your back with your knees bent and feet flat on the floor  Place your hands, palms down, underneath the curve in your lower back  Next, with your elbows on the floor, lift your shoulders and chest 2 to 3 inches  Keep your head in line with your shoulders  Hold this position for 5 seconds  When you can do this exercise without pain for 10 to 15 seconds, you may add a rotation  While your shoulders and chest are lifted off the ground, turn slightly to the left and hold  Repeat on the other side  · Bird dog:  Place your hands and knees on the floor  Keep your wrists directly below your shoulders and your knees directly below your hips  Pull your belly button in toward your spine  Do not flatten or arch your back  Tighten your abdominal muscles  Raise one arm straight out so that it is aligned with your head  Next, raise the leg opposite your arm  Hold this position for 15 seconds  Lower your arm and leg slowly and change sides  Do 5 sets  © Copyright [employer] 2022 Information is for End User's use only and may not be sold, redistributed or otherwise used for commercial purposes  All illustrations and images included in CareNotes® are the copyrighted property of A D A M , Inc  or Philly Mane   The above information is an  only  It is not intended as medical advice for individual conditions or treatments   Talk to your doctor, nurse or pharmacist before following any medical regimen to see if it is safe and effective for you

## 2022-04-25 NOTE — ASSESSMENT & PLAN NOTE
Patient is not ready to quit at this time but states she is down to 1/2 to 1 ppd  Counseled regarding smoking cessation

## 2022-04-26 LAB
C TRACH DNA SPEC QL NAA+PROBE: NEGATIVE
HIV 1+2 AB+HIV1 P24 AG SERPL QL IA: NORMAL
N GONORRHOEA DNA SPEC QL NAA+PROBE: NEGATIVE
RPR SER QL: NORMAL

## 2022-04-27 NOTE — TELEPHONE ENCOUNTER
Scheduled date of colonoscopy/ERCP(as of today):  6/21/22  Physician performing colonoscopy/ERCP-: Dr Espinosa  [facility] colon[place] [facility] [place] [place]  Bowel prep reviewed with patient: Miralax/Dulcolax  Instructions reviewed with patient by: Sonia/reji  Clearances: N/A

## 2022-06-02 ENCOUNTER — TELEPHONE (OUTPATIENT)
Dept: FAMILY MEDICINE CLINIC | Facility: CLINIC | Age: 47
End: 2022-06-02

## 2022-06-02 NOTE — TELEPHONE ENCOUNTER
Work Physical Form form received on 6/02/22  to be completed by PCP  Copy made and placed in PCP folder  Forms to be delivered to PCP mailbox at assigned time  Patient came in on 4/25 for pre-employment phsyical with you

## 2022-06-15 NOTE — TELEPHONE ENCOUNTER
Called pt to confirm procedure on 6/21  States she moved and can't find instructions  Miralax/Dulcolax instructions emailed to [email or web address]

## 2022-06-21 ENCOUNTER — ANESTHESIA (OUTPATIENT)
Dept: GASTROENTEROLOGY | Facility: HOSPITAL | Age: 47
End: 2022-06-21

## 2022-06-21 ENCOUNTER — HOSPITAL ENCOUNTER (OUTPATIENT)
Dept: GASTROENTEROLOGY | Facility: HOSPITAL | Age: 47
Setting detail: OUTPATIENT SURGERY
Discharge: HOME | End: 2022-06-21
Attending: INTERNAL MEDICINE
Payer: COMMERCIAL

## 2022-06-21 ENCOUNTER — HOSPITAL ENCOUNTER (OUTPATIENT)
Dept: RADIOLOGY | Facility: HOSPITAL | Age: 47
Discharge: HOME | End: 2022-06-21
Payer: COMMERCIAL

## 2022-06-21 ENCOUNTER — ANESTHESIA EVENT (OUTPATIENT)
Dept: GASTROENTEROLOGY | Facility: HOSPITAL | Age: 47
End: 2022-06-21

## 2022-06-21 VITALS
RESPIRATION RATE: 16 BRPM | DIASTOLIC BLOOD PRESSURE: 74 MMHG | HEIGHT: 62 IN | WEIGHT: 142 LBS | OXYGEN SATURATION: 98 % | SYSTOLIC BLOOD PRESSURE: 144 MMHG | TEMPERATURE: 97.5 F | BODY MASS INDEX: 26.13 KG/M2 | HEART RATE: 68 BPM

## 2022-06-21 DIAGNOSIS — K80.50 CHOLEDOCHOLITHIASIS: ICD-10-CM

## 2022-06-21 DIAGNOSIS — Z12.11 COLON CANCER SCREENING: ICD-10-CM

## 2022-06-21 LAB
EXT PREGNANCY TEST URINE: NEGATIVE
EXT. CONTROL: NORMAL

## 2022-06-21 PROCEDURE — C1769 GUIDE WIRE: HCPCS

## 2022-06-21 PROCEDURE — G0121 COLON CA SCRN NOT HI RSK IND: HCPCS | Performed by: INTERNAL MEDICINE

## 2022-06-21 PROCEDURE — 43264 ERCP REMOVE DUCT CALCULI: CPT | Performed by: INTERNAL MEDICINE

## 2022-06-21 PROCEDURE — 81025 URINE PREGNANCY TEST: CPT | Performed by: ANESTHESIOLOGY

## 2022-06-21 PROCEDURE — 74328 X-RAY BILE DUCT ENDOSCOPY: CPT

## 2022-06-21 PROCEDURE — A9585 GADOBUTROL INJECTION: HCPCS | Performed by: INTERNAL MEDICINE

## 2022-06-21 PROCEDURE — 43275 ERCP REMOVE FORGN BODY DUCT: CPT | Performed by: INTERNAL MEDICINE

## 2022-06-21 RX ORDER — ONDANSETRON 2 MG/ML
INJECTION INTRAMUSCULAR; INTRAVENOUS AS NEEDED
Status: DISCONTINUED | OUTPATIENT
Start: 2022-06-21 | End: 2022-06-21

## 2022-06-21 RX ORDER — LIDOCAINE HYDROCHLORIDE 10 MG/ML
INJECTION, SOLUTION EPIDURAL; INFILTRATION; INTRACAUDAL; PERINEURAL AS NEEDED
Status: DISCONTINUED | OUTPATIENT
Start: 2022-06-21 | End: 2022-06-21

## 2022-06-21 RX ORDER — FENTANYL CITRATE 50 UG/ML
INJECTION, SOLUTION INTRAMUSCULAR; INTRAVENOUS AS NEEDED
Status: DISCONTINUED | OUTPATIENT
Start: 2022-06-21 | End: 2022-06-21

## 2022-06-21 RX ORDER — SODIUM CHLORIDE, SODIUM LACTATE, POTASSIUM CHLORIDE, CALCIUM CHLORIDE 600; 310; 30; 20 MG/100ML; MG/100ML; MG/100ML; MG/100ML
INJECTION, SOLUTION INTRAVENOUS CONTINUOUS PRN
Status: DISCONTINUED | OUTPATIENT
Start: 2022-06-21 | End: 2022-06-21

## 2022-06-21 RX ORDER — PROPOFOL 10 MG/ML
INJECTION, EMULSION INTRAVENOUS AS NEEDED
Status: DISCONTINUED | OUTPATIENT
Start: 2022-06-21 | End: 2022-06-21

## 2022-06-21 RX ORDER — SUCCINYLCHOLINE/SOD CL,ISO/PF 100 MG/5ML
SYRINGE (ML) INTRAVENOUS AS NEEDED
Status: DISCONTINUED | OUTPATIENT
Start: 2022-06-21 | End: 2022-06-21

## 2022-06-21 RX ORDER — MIDAZOLAM HYDROCHLORIDE 2 MG/2ML
INJECTION, SOLUTION INTRAMUSCULAR; INTRAVENOUS AS NEEDED
Status: DISCONTINUED | OUTPATIENT
Start: 2022-06-21 | End: 2022-06-21

## 2022-06-21 RX ORDER — DEXAMETHASONE SODIUM PHOSPHATE 10 MG/ML
INJECTION, SOLUTION INTRAMUSCULAR; INTRAVENOUS AS NEEDED
Status: DISCONTINUED | OUTPATIENT
Start: 2022-06-21 | End: 2022-06-21

## 2022-06-21 RX ADMIN — SODIUM CHLORIDE, SODIUM LACTATE, POTASSIUM CHLORIDE, AND CALCIUM CHLORIDE: .6; .31; .03; .02 INJECTION, SOLUTION INTRAVENOUS at 11:18

## 2022-06-21 RX ADMIN — MIDAZOLAM 2 MG: 1 INJECTION INTRAMUSCULAR; INTRAVENOUS at 11:21

## 2022-06-21 RX ADMIN — GADOBUTROL 10 ML: 604.72 INJECTION INTRAVENOUS at 11:40

## 2022-06-21 RX ADMIN — Medication 100 MG: at 11:23

## 2022-06-21 RX ADMIN — LIDOCAINE HYDROCHLORIDE 100 MG: 10 INJECTION, SOLUTION EPIDURAL; INFILTRATION; INTRACAUDAL; PERINEURAL at 11:22

## 2022-06-21 RX ADMIN — DEXAMETHASONE SODIUM PHOSPHATE 10 MG: 10 INJECTION, SOLUTION INTRAMUSCULAR; INTRAVENOUS at 11:23

## 2022-06-21 RX ADMIN — PROPOFOL 50 MG: 10 INJECTION, EMULSION INTRAVENOUS at 11:30

## 2022-06-21 RX ADMIN — PROPOFOL 150 MG: 10 INJECTION, EMULSION INTRAVENOUS at 11:22

## 2022-06-21 RX ADMIN — FENTANYL CITRATE 100 MCG: 50 INJECTION INTRAMUSCULAR; INTRAVENOUS at 11:22

## 2022-06-21 RX ADMIN — ONDANSETRON 4 MG: 2 INJECTION INTRAMUSCULAR; INTRAVENOUS at 12:00

## 2022-06-21 NOTE — H&P
History and Physical -  [facility]  Yolanda Prasad 56 y o  female MRN: [id number]    HPI: Yolanda Prasad is a 56y o  year old female who presents with colonc ancer screening and choledocholithiasis  Review of Systems    Historical Information   Past Medical History:   Diagnosis Date    Asthma     Gallstone      Past Surgical History:   Procedure Laterality Date    CHOLECYSTECTOMY  2016     Social History   Social History     Substance and Sexual Activity   Alcohol Use Not Currently     Social History     Substance and Sexual Activity   Drug Use Not Currently     Social History     Tobacco Use   Smoking Status Current Every Day Smoker    Packs/day: 1 00   Smokeless Tobacco Current User     Family History   Problem Relation Age of Onset    Asthma Mother     No Known Problems Sister     No Known Problems Maternal Grandmother     No Known Problems Maternal Grandfather     No Known Problems Paternal Grandmother     No Known Problems Paternal Grandfather     No Known Problems Maternal Aunt     No Known Problems Paternal Aunt     Breast cancer Paternal Aunt     No Known Problems Paternal Aunt     No Known Problems Paternal Aunt        Meds/Allergies     (Not in a hospital admission)      No Known Allergies    Objective     /81   Pulse 82   Temp 97 9 °F (36 6 °C) (Tympanic)   Resp 16   Ht 5' 2" (1 575 m)   Wt 64 4 kg (142 lb)   LMP 06/20/2022   SpO2 99%   BMI 25 97 kg/m²       PHYSICAL EXAM    Gen: NAD  CV: RRR  CHEST: Clear  ABD: soft, NT/ND  EXT: no edema  Neuro: AAO      ASSESSMENT/PLAN:  This is a 56y o  year old female here for ERCP for choledocholithiasis and colonsocopy for colon cancer screening  PLAN:   Procedure: ERCP/ colonoscopy

## 2022-06-21 NOTE — ANESTHESIA POSTPROCEDURE EVALUATION
Post-Op Assessment Note    CV Status:  Stable  Pain Score: 0    Pain management: adequate     Mental Status:  Sleepy and arousable   Hydration Status:  Euvolemic   PONV Controlled:  Controlled   Airway Patency:  Patent      Post Op Vitals Reviewed: Yes      Staff: CRNA   Comments: small abrasion noted to upper lip when bite block was removed from procedure        No complications documented      BP   135/73   Temp   96 5   Pulse   77   Resp   12   SpO2   98% RA

## 2022-06-21 NOTE — ANESTHESIA PREPROCEDURE EVALUATION
Procedure:  ERCP  COLONOSCOPY    Relevant Problems   MUSCULOSKELETAL   (+) Acute bilateral low back pain without sciatica      NEURO/PSYCH   (+) Anxiety and depression   (+) Numbness and tingling in both hands      PULMONARY   (+) Asthma      Upreg negative    Active smoker    CAD/PCI/MI/CHF - Denies  COPD/ASTHMA/CHERRIE - Endorses asthma, reports rare rescue inhaler use, uses Advair daily  GERD - Denies  PROBLEMS WITH PRIOR ANESTHESIA - Denies  NPO STATUS - Patient reports last PO intake (both solid and liquid) was prior to midnight  Physical Exam    Airway    Mallampati score: II  TM Distance: >3 FB  Neck ROM: full     Dental   Comment: Denies having any loose, broken, or removable teeth,     Cardiovascular  Rhythm: regular, Rate: normal,     Pulmonary  Comment: Normal work of breathing, not tachypneic, speaking in full sentences, Pulmonary exam normal     Other Findings        Anesthesia Plan  ASA Score- 2     Anesthesia Type- general with ASA Monitors  Additional Monitors:   Airway Plan: ETT  Plan Factors-Exercise tolerance (METS): >4 METS  Chart reviewed  Patient is a current smoker  Patient not instructed to abstain from smoking on day of procedure  Patient smoked on day of surgery  Induction- intravenous  Postoperative Plan-     Informed Consent- Anesthetic plan and risks discussed with patient  I personally reviewed this patient with the CRNA  Discussed and agreed on the Anesthesia Plan with the CRNA  Liza Valle MD, personally examined the patient, reviewed the patient history and laboratory data, and explained the risks/benefits of the anesthetic to the patient  The patient has signed the appropriate consents and is ready to proceed

## 2022-07-19 ENCOUNTER — TELEPHONE (OUTPATIENT)
Dept: GASTROENTEROLOGY | Facility: CLINIC | Age: 47
End: 2022-07-19

## 2022-07-19 NOTE — TELEPHONE ENCOUNTER
----- Message from Kalyan Urbina MD sent at 6/21/2022 12:26 PM EDT -----  Hello  Please arrange for repeat colonoscopy with 2 day prep - with anyone and any location  Thank you     Jillian Howell

## 2022-12-12 ENCOUNTER — HOSPITAL ENCOUNTER (EMERGENCY)
Facility: HOSPITAL | Age: 47
Discharge: HOME | End: 2022-12-12
Attending: STUDENT IN AN ORGANIZED HEALTH CARE EDUCATION/TRAINING PROGRAM

## 2022-12-12 VITALS
WEIGHT: 163.14 LBS | BODY MASS INDEX: 29.84 KG/M2 | OXYGEN SATURATION: 100 % | HEART RATE: 85 BPM | RESPIRATION RATE: 20 BRPM | SYSTOLIC BLOOD PRESSURE: 144 MMHG | TEMPERATURE: 97.9 F | DIASTOLIC BLOOD PRESSURE: 84 MMHG

## 2022-12-12 DIAGNOSIS — G43.809 OTHER MIGRAINE WITHOUT STATUS MIGRAINOSUS, NOT INTRACTABLE: Primary | ICD-10-CM

## 2022-12-12 RX ORDER — NAPROXEN 500 MG/1
500 TABLET ORAL 2 TIMES DAILY WITH MEALS
Qty: 30 TABLET | Refills: 0 | Status: SHIPPED | OUTPATIENT
Start: 2022-12-12

## 2022-12-12 RX ORDER — METOCLOPRAMIDE HYDROCHLORIDE 5 MG/ML
10 INJECTION INTRAMUSCULAR; INTRAVENOUS ONCE
Status: DISCONTINUED | OUTPATIENT
Start: 2022-12-12 | End: 2022-12-12

## 2022-12-12 RX ORDER — METOCLOPRAMIDE 10 MG/1
10 TABLET ORAL ONCE
Status: COMPLETED | OUTPATIENT
Start: 2022-12-12 | End: 2022-12-12

## 2022-12-12 RX ORDER — DIPHENHYDRAMINE HCL 25 MG
25 TABLET ORAL ONCE
Status: COMPLETED | OUTPATIENT
Start: 2022-12-12 | End: 2022-12-12

## 2022-12-12 RX ORDER — NAPROXEN 500 MG/1
500 TABLET ORAL ONCE
Status: COMPLETED | OUTPATIENT
Start: 2022-12-12 | End: 2022-12-12

## 2022-12-12 RX ORDER — KETOROLAC TROMETHAMINE 30 MG/ML
15 INJECTION, SOLUTION INTRAMUSCULAR; INTRAVENOUS ONCE
Status: DISCONTINUED | OUTPATIENT
Start: 2022-12-12 | End: 2022-12-12

## 2022-12-12 RX ORDER — DIPHENHYDRAMINE HYDROCHLORIDE 50 MG/ML
25 INJECTION INTRAMUSCULAR; INTRAVENOUS ONCE
Status: DISCONTINUED | OUTPATIENT
Start: 2022-12-12 | End: 2022-12-12

## 2022-12-12 RX ADMIN — DIPHENHYDRAMINE HCL 25 MG: 25 TABLET ORAL at 14:53

## 2022-12-12 RX ADMIN — NAPROXEN 500 MG: 500 TABLET ORAL at 14:53

## 2022-12-12 RX ADMIN — METOCLOPRAMIDE 10 MG: 10 TABLET ORAL at 14:53

## 2022-12-12 NOTE — ED PROVIDER NOTES
History  Chief Complaint   Patient presents with   • Migraine     History of migraines; states more frequent for the past 2 months; Bad one Saturday and today had to leave work; "the headache is making me shaky"     67-year-old female with PMH significant for migraines here for evaluation of a headache  Patient reports that her current headache started 2 days ago, states that she took Excedrin which usually helps her symptoms, but today the headache was so bad that she had to leave work  She states that she has been nauseated, and also is sensitive to the light and sounds  Describes a throbbing sensation in the front of her head  States this feels similar to her previous migraines, but that over the last 2 months her migraines have been occurring more frequently and have been more intense  She states that she did used to get some sort of shots in [place] for her migraines, but has not seen a neurologist since she moved to the area several years ago  Denies any fevers, head injury, vomiting, or other complaints  Prior to Admission Medications   Prescriptions Last Dose Informant Patient Reported? Taking?    albuterol (Ventolin HFA) 90 mcg/act inhaler   No No   Sig: Inhale 2 puffs every 6 (six) hours as needed for wheezing   escitalopram (LEXAPRO) 10 mg tablet   No No   Sig: Take 1 tablet (10 mg total) by mouth daily   fluticasone-salmeterol (Advair Diskus) 100-50 mcg/dose inhaler   No No   Sig: Inhale 1 puff 2 (two) times a day Rinse mouth after use    hydrOXYzine HCL (ATARAX) 25 mg tablet   No No   Sig: Take 1 tablet (25 mg total) by mouth 3 (three) times a day as needed for anxiety   meclizine (ANTIVERT) 25 mg tablet   No No   Sig: Take 1 tablet (25 mg total) by mouth 3 (three) times a day as needed for dizziness   naproxen sodium (ALEVE) 220 MG tablet   No No   Sig: Take 1 tablet (220 mg total) by mouth 2 (two) times a day with meals      Facility-Administered Medications: None       Past Medical History:   Diagnosis Date   • Asthma    • Gallstone    • Migraine        Past Surgical History:   Procedure Laterality Date   • CHOLECYSTECTOMY  2016       Family History   Problem Relation Age of Onset   • Asthma Mother    • No Known Problems Sister    • No Known Problems Maternal Grandmother    • No Known Problems Maternal Grandfather    • No Known Problems Paternal Grandmother    • No Known Problems Paternal Grandfather    • No Known Problems Maternal Aunt    • No Known Problems Paternal Aunt    • Breast cancer Paternal Aunt    • No Known Problems Paternal Aunt    • No Known Problems Paternal Aunt      I have reviewed and agree with the history as documented  E-Cigarette/Vaping   • E-Cigarette Use Never User      E-Cigarette/Vaping Substances     Social History     Tobacco Use   • Smoking status: Every Day     Packs/day: 0 50     Types: Cigarettes   • Smokeless tobacco: Current   Vaping Use   • Vaping Use: Never used   Substance Use Topics   • Alcohol use: Not Currently   • Drug use: Yes     Types: Marijuana       Review of Systems   Constitutional: Negative for chills and fever  HENT: Negative for ear pain and sore throat  Eyes: Positive for photophobia  Negative for pain and visual disturbance  Respiratory: Negative for cough and shortness of breath  Cardiovascular: Negative for chest pain and palpitations  Gastrointestinal: Positive for nausea  Negative for abdominal pain and vomiting  Genitourinary: Negative for dysuria and hematuria  Musculoskeletal: Negative for arthralgias, back pain and neck stiffness  Skin: Negative for color change and rash  Neurological: Positive for headaches  Negative for seizures and syncope  Physical Exam  Physical Exam  Vitals and nursing note reviewed  Constitutional:       General: She is not in acute distress  Appearance: She is well-developed  She is not ill-appearing  HENT:      Head: Normocephalic and atraumatic     Eyes:      Extraocular Movements: Extraocular movements intact  Conjunctiva/sclera: Conjunctivae normal       Pupils: Pupils are equal, round, and reactive to light  Cardiovascular:      Rate and Rhythm: Normal rate and regular rhythm  Heart sounds: No murmur heard  Pulmonary:      Effort: Pulmonary effort is normal  No respiratory distress  Breath sounds: Normal breath sounds  Abdominal:      Palpations: Abdomen is soft  Tenderness: There is no abdominal tenderness  Musculoskeletal:         General: No swelling  Cervical back: Normal range of motion and neck supple  No rigidity or tenderness  Skin:     General: Skin is warm and dry  Capillary Refill: Capillary refill takes less than 2 seconds  Neurological:      Mental Status: She is alert and oriented to person, place, and time  Cranial Nerves: No dysarthria or facial asymmetry  Sensory: Sensation is intact  No sensory deficit  Motor: No weakness or pronator drift  Gait: Gait is intact     Psychiatric:      Comments: Tearful         Vital Signs  ED Triage Vitals [12/12/22 1348]   Temperature Pulse Respirations Blood Pressure SpO2   97 9 °F (36 6 °C) 85 20 144/84 100 %      Temp Source Heart Rate Source Patient Position - Orthostatic VS BP Location FiO2 (%)   Oral Monitor Sitting Right arm --      Pain Score       10 - Worst Possible Pain           Vitals:    12/12/22 1348   BP: 144/84   Pulse: 85   Patient Position - Orthostatic VS: Sitting         Visual Acuity      ED Medications  Medications   lactated ringers bolus 1,000 mL (has no administration in time range)   ketorolac (TORADOL) injection 15 mg (has no administration in time range)   metoclopramide (REGLAN) injection 10 mg (has no administration in time range)   diphenhydrAMINE (BENADRYL) injection 25 mg (has no administration in time range)       Diagnostic Studies  Results Reviewed     None                 No orders to display              Procedures  Procedures ED Course  ED Course as of 12/12/22 1956   Mon Dec 12, 2022   1446 Patient declining IV and migraine cocktail  Will give oral option, although patient is aware that she cannot then subsequently have the IV medications afterwards  MDM  Number of Diagnoses or Management Options  Other migraine without status migrainosus, not intractable  Diagnosis management comments: Patient presenting with likely migraine headache  Overall well-appearing, no neurologic deficits  Not currently following with neurology  Not on any prophylactic meds or abortive meds  Migraine cocktail was ordered for symptomatic management in the emergency department  Patient subsequently declined placement of an IV and stated she wanted a quicker option that would get her out of the department  She was counseled that oral medications would preclude use of the IV meds if they did not work, and she was understanding  She was given oral medications and did feel improved  Symptomatic management and return precautions were reviewed with the patient  Disposition  Final diagnoses:   None     ED Disposition     None      Follow-up Information    None         Patient's Medications   Discharge Prescriptions    No medications on file       No discharge procedures on file      PDMP Review       Value Time User    PDMP Reviewed  Yes 4/30/2021 12:04 PM Sandra Huggins MD          ED Provider  Electronically Signed by           Rodrigo Huynh MD  12/12/22 2002

## 2022-12-12 NOTE — DISCHARGE INSTRUCTIONS
You were seen in the emergency department today for a headache  You received medications to help with your pain  Follow-up with your primary care doctor in the next week for reevaluation and to discuss medications to help prevent your headaches  If you continue to have these headaches, you may need a referral from your primary doctor to see neurology  Return to the emergency department if you develop fevers, or your symptoms worsen significantly

## 2022-12-12 NOTE — Clinical Note
Geraldo Aguilar was seen and treated in our emergency department on 12/12/2022  Diagnosis:     Franco Hashimoto  may return to work on return date  She may return on this date: 12/13/2022         If you have any questions or concerns, please don't hesitate to call        Sarita Catherine MD    ______________________________           _______________          _______________  Hospital Representative                              Date                                Time

## 2022-12-12 NOTE — ED NOTES
When this nurse entered room pt stated that, "I don't want an IV or to be here all day"   Explained to pt the providers plan and pt requested to speak with MD  Provider made aware of request       Wilman Kimbrough RN  12/12/22 9485

## 2023-01-05 ENCOUNTER — OFFICE VISIT (OUTPATIENT)
Dept: FAMILY MEDICINE CLINIC | Facility: CLINIC | Age: 48
End: 2023-01-05

## 2023-01-05 VITALS
BODY MASS INDEX: 30.4 KG/M2 | OXYGEN SATURATION: 99 % | TEMPERATURE: 98.4 F | RESPIRATION RATE: 20 BRPM | SYSTOLIC BLOOD PRESSURE: 124 MMHG | HEART RATE: 108 BPM | HEIGHT: 62 IN | DIASTOLIC BLOOD PRESSURE: 88 MMHG | WEIGHT: 165.2 LBS

## 2023-01-05 DIAGNOSIS — B34.9 VIRAL ILLNESS: ICD-10-CM

## 2023-01-05 DIAGNOSIS — F41.9 ANXIETY AND DEPRESSION: ICD-10-CM

## 2023-01-05 DIAGNOSIS — Z72.0 TOBACCO USE: ICD-10-CM

## 2023-01-05 DIAGNOSIS — Z12.4 CERVICAL CANCER SCREENING: ICD-10-CM

## 2023-01-05 DIAGNOSIS — Z12.31 BREAST CANCER SCREENING BY MAMMOGRAM: ICD-10-CM

## 2023-01-05 DIAGNOSIS — J45.41 MODERATE PERSISTENT ASTHMA WITH ACUTE EXACERBATION: Primary | ICD-10-CM

## 2023-01-05 DIAGNOSIS — F32.A ANXIETY AND DEPRESSION: ICD-10-CM

## 2023-01-05 PROBLEM — R11.2 NAUSEA & VOMITING: Status: RESOLVED | Noted: 2021-04-27 | Resolved: 2023-01-05

## 2023-01-05 LAB
DME PARACHUTE DELIVERY DATE REQUESTED: NORMAL
DME PARACHUTE ITEM DESCRIPTION: NORMAL
DME PARACHUTE ORDER STATUS: NORMAL
DME PARACHUTE SUPPLIER NAME: NORMAL
DME PARACHUTE SUPPLIER PHONE: NORMAL

## 2023-01-05 RX ORDER — BENZONATATE 200 MG/1
200 CAPSULE ORAL 3 TIMES DAILY PRN
Qty: 20 CAPSULE | Refills: 0 | Status: SHIPPED | OUTPATIENT
Start: 2023-01-05

## 2023-01-05 RX ORDER — ALBUTEROL SULFATE 90 UG/1
2 AEROSOL, METERED RESPIRATORY (INHALATION) EVERY 6 HOURS PRN
Qty: 18 G | Refills: 5 | Status: SHIPPED | OUTPATIENT
Start: 2023-01-05

## 2023-01-05 RX ORDER — ESCITALOPRAM OXALATE 10 MG/1
10 TABLET ORAL DAILY
Qty: 30 TABLET | Refills: 0 | Status: SHIPPED | OUTPATIENT
Start: 2023-01-05

## 2023-01-05 RX ORDER — PREDNISONE 20 MG/1
60 TABLET ORAL DAILY
Qty: 15 TABLET | Refills: 0 | Status: SHIPPED | OUTPATIENT
Start: 2023-01-05 | End: 2023-01-10

## 2023-01-05 RX ORDER — FLUTICASONE PROPIONATE AND SALMETEROL XINAFOATE 115; 21 UG/1; UG/1
2 AEROSOL, METERED RESPIRATORY (INHALATION) 2 TIMES DAILY
Qty: 36 G | Refills: 1 | Status: SHIPPED | OUTPATIENT
Start: 2023-01-05

## 2023-01-05 RX ORDER — IPRATROPIUM BROMIDE AND ALBUTEROL SULFATE 2.5; .5 MG/3ML; MG/3ML
3 SOLUTION RESPIRATORY (INHALATION) 4 TIMES DAILY
Qty: 400 ML | Refills: 1 | Status: SHIPPED | OUTPATIENT
Start: 2023-01-05

## 2023-01-05 NOTE — ASSESSMENT & PLAN NOTE
Current exacerbation   Restart Advair bid, albuterol PRN   Will send nebulizer for duo-neb treatments, swab for COVID/influenza, steroid course   ED parameters discussed

## 2023-01-05 NOTE — PROGRESS NOTES
Name: Larry Betancourt      : 1975      MRN: [id number]  Encounter Provider: EVAN Jeffries  Encounter Date: 2023   Encounter department: [address] [place] [address]     1  Moderate persistent asthma with acute exacerbation  Assessment & Plan:  Current exacerbation   Restart Advair bid, albuterol PRN   Will send nebulizer for duo-neb treatments, swab for COVID/influenza, steroid course   ED parameters discussed     Orders:  -     albuterol (Ventolin HFA) 90 mcg/act inhaler; Inhale 2 puffs every 6 (six) hours as needed for wheezing  -     fluticasone-salmeterol (Advair HFA) 115-21 MCG/ACT inhaler; Inhale 2 puffs 2 (two) times a day Rinse mouth after use  -     ipratropium-albuterol (DUO-NEB) 0 5-2 5 mg/3 mL nebulizer solution; Take 3 mL by nebulization 4 (four) times a day  -     Covid/Flu- Office Collect  -     benzonatate (TESSALON) 200 MG capsule; Take 1 capsule (200 mg total) by mouth 3 (three) times a day as needed for cough  -     predniSONE 20 mg tablet; Take 3 tablets (60 mg total) by mouth daily for 5 days    2  Tobacco use  Assessment & Plan:  -continue to encourage cessation  -patient is aware of pharmacotherapy aids to assist with cessation         3  Breast cancer screening by mammogram  -     Mammo screening bilateral w 3d & cad; Future; Expected date: 2023    4  Cervical cancer screening  -     Ambulatory Referral to Obstetrics / Gynecology; Future    5  Anxiety and depression  -     escitalopram (LEXAPRO) 10 mg tablet; Take 1 tablet (10 mg total) by mouth daily    6  Viral illness  -     Covid/Flu- Office Collect         Subjective     Larry Betancourt is a 56 y o  female who  has a past medical history of Asthma and Gallstone  who presented to the office today for follow up  Reports that she is out of medications  Last seen 2021  Review of Systems   Constitutional: Negative for chills and fever  HENT: Positive for congestion   Negative for ear pain and sore throat  Eyes: Negative for pain and visual disturbance  Respiratory: Positive for cough, chest tightness, shortness of breath and wheezing  Cardiovascular: Negative for chest pain and palpitations  Gastrointestinal: Negative for abdominal pain and vomiting  Genitourinary: Negative for dysuria and hematuria  Musculoskeletal: Negative for arthralgias and back pain  Skin: Negative for color change and rash  Neurological: Negative for seizures and syncope  All other systems reviewed and are negative        Past Medical History:   Diagnosis Date   • Asthma    • Gallstone    • Migraine      Past Surgical History:   Procedure Laterality Date   • CHOLECYSTECTOMY  2016     Family History   Problem Relation Age of Onset   • Asthma Mother    • No Known Problems Sister    • No Known Problems Maternal Grandmother    • No Known Problems Maternal Grandfather    • No Known Problems Paternal Grandmother    • No Known Problems Paternal Grandfather    • No Known Problems Maternal Aunt    • No Known Problems Paternal Aunt    • Breast cancer Paternal Aunt    • No Known Problems Paternal Aunt    • No Known Problems Paternal Aunt      Social History     Socioeconomic History   • Marital status: Single     Spouse name: None   • Number of children: None   • Years of education: None   • Highest education level: None   Occupational History   • None   Tobacco Use   • Smoking status: Every Day     Packs/day: 0 50     Types: Cigarettes   • Smokeless tobacco: Current   Vaping Use   • Vaping Use: Never used   Substance and Sexual Activity   • Alcohol use: Not Currently   • Drug use: Yes     Types: Marijuana   • Sexual activity: Not Currently   Other Topics Concern   • None   Social History Narrative   • None     Social Determinants of Health     Financial Resource Strain: Not on file   Food Insecurity: Not on file   Transportation Needs: Not on file   Physical Activity: Not on file   Stress: Not on file Social Connections: Not on file   Intimate Partner Violence: Not on file   Housing Stability: Not on file     Current Outpatient Medications on File Prior to Visit   Medication Sig   • hydrOXYzine HCL (ATARAX) 25 mg tablet Take 1 tablet (25 mg total) by mouth 3 (three) times a day as needed for anxiety   • meclizine (ANTIVERT) 25 mg tablet Take 1 tablet (25 mg total) by mouth 3 (three) times a day as needed for dizziness   • naproxen (Naprosyn) 500 mg tablet Take 1 tablet (500 mg total) by mouth 2 (two) times a day with meals   • [DISCONTINUED] albuterol (Ventolin HFA) 90 mcg/act inhaler Inhale 2 puffs every 6 (six) hours as needed for wheezing   • [DISCONTINUED] escitalopram (LEXAPRO) 10 mg tablet Take 1 tablet (10 mg total) by mouth daily   • [DISCONTINUED] fluticasone-salmeterol (Advair Diskus) 100-50 mcg/dose inhaler Inhale 1 puff 2 (two) times a day Rinse mouth after use  No Known Allergies  Immunization History   Administered Date(s) Administered   • COVID-19 Pfizer vac (Spencer-sucrose, gray cap) 12 yr+ IM 01/17/2022       Objective     /88 (BP Location: Right arm, Patient Position: Sitting, Cuff Size: Standard)   Pulse (!) 108   Temp 98 4 °F (36 9 °C) (Temporal)   Resp 20   Ht 5' 2" (1 575 m)   Wt 74 9 kg (165 lb 3 2 oz)   LMP 12/15/2022 (Within Days)   SpO2 99%   BMI 30 22 kg/m²     Physical Exam  Vitals and nursing note reviewed  Constitutional:       General: She is not in acute distress  Appearance: She is well-developed  She is not diaphoretic  HENT:      Head: Normocephalic and atraumatic  Right Ear: External ear normal       Left Ear: External ear normal       Nose: Congestion present  Eyes:      Conjunctiva/sclera: Conjunctivae normal       Pupils: Pupils are equal, round, and reactive to light  Cardiovascular:      Rate and Rhythm: Normal rate and regular rhythm  Pulmonary:      Effort: Pulmonary effort is normal  No respiratory distress        Breath sounds: Wheezing present  Comments: Throughout all fields     Abdominal:      General: Bowel sounds are normal  There is no distension  Palpations: Abdomen is soft  Tenderness: There is no abdominal tenderness  Musculoskeletal:         General: No deformity  Normal range of motion  Cervical back: Normal range of motion and neck supple  Lymphadenopathy:      Cervical: No cervical adenopathy  Skin:     General: Skin is warm and dry  Capillary Refill: Capillary refill takes less than 2 seconds  Findings: No rash  Neurological:      Mental Status: She is alert and oriented to person, place, and time     Psychiatric:         Behavior: Behavior normal        EVAN Zambrano

## 2023-01-05 NOTE — LETTER
January 5, 2023     Patient: Starr Choudhary   YOB: 1975   Date of Visit: 1/5/2023       To Whom it May Concern:    Starr Choudhary was seen in my clinic on 1/5/2023  She may return to work on 1/9/2023       If you have any questions or concerns, please don't hesitate to call           Sincerely,          EVAN Zambrano        CC: No Recipients

## 2023-01-06 ENCOUNTER — TELEPHONE (OUTPATIENT)
Dept: FAMILY MEDICINE CLINIC | Facility: CLINIC | Age: 48
End: 2023-01-06

## 2023-01-06 LAB
FLUAV RNA RESP QL NAA+PROBE: NEGATIVE
FLUBV RNA RESP QL NAA+PROBE: NEGATIVE
SARS-COV-2 RNA RESP QL NAA+PROBE: NEGATIVE

## 2023-01-06 NOTE — TELEPHONE ENCOUNTER
Pt called the nurse line stating she has been feeling shaky and believes it is from a medication she was started on yesterday   Pt is looking for advice thank you

## 2023-01-06 NOTE — TELEPHONE ENCOUNTER
Hi, my name is Jelani Blancas  Thursday is [id number]  I want to leave this message for Slime  She's my doctor  I'm just trying to figure out which of the medicines got me so shaky  I've been shaking since yesterday  OK  And she reached me at [phone]  Thank you  I would greatly appreciate that

## 2023-01-06 NOTE — TELEPHONE ENCOUNTER
Pt states the only thing that she took yesterday was the purple pump  States she is not feeling worse today just shaky  States she is going to take the pills one by one to see which is the cause

## 2023-02-02 ENCOUNTER — TELEPHONE (OUTPATIENT)
Dept: OTHER | Facility: OTHER | Age: 48
End: 2023-02-02

## 2023-02-02 NOTE — TELEPHONE ENCOUNTER
Pt calling to make an appointment  Please call back to schedule  Lokking for a afternoon appointment,

## 2023-02-06 ENCOUNTER — VBI (OUTPATIENT)
Dept: ADMINISTRATIVE | Facility: OTHER | Age: 48
End: 2023-02-06

## 2023-02-10 ENCOUNTER — APPOINTMENT (OUTPATIENT)
Dept: LAB | Facility: HOSPITAL | Age: 48
End: 2023-02-10

## 2023-02-10 ENCOUNTER — OFFICE VISIT (OUTPATIENT)
Dept: FAMILY MEDICINE CLINIC | Facility: CLINIC | Age: 48
End: 2023-02-10

## 2023-02-10 VITALS
WEIGHT: 167.8 LBS | OXYGEN SATURATION: 99 % | BODY MASS INDEX: 30.88 KG/M2 | TEMPERATURE: 97.6 F | HEIGHT: 62 IN | RESPIRATION RATE: 17 BRPM | HEART RATE: 83 BPM | SYSTOLIC BLOOD PRESSURE: 110 MMHG | DIASTOLIC BLOOD PRESSURE: 84 MMHG

## 2023-02-10 DIAGNOSIS — N92.6 MISSED PERIOD: ICD-10-CM

## 2023-02-10 DIAGNOSIS — J45.41 MODERATE PERSISTENT ASTHMA WITH ACUTE EXACERBATION: Primary | ICD-10-CM

## 2023-02-10 DIAGNOSIS — N92.6 IRREGULAR MENSES: ICD-10-CM

## 2023-02-10 DIAGNOSIS — R79.89 ELEVATED LFTS: ICD-10-CM

## 2023-02-10 DIAGNOSIS — E66.9 OBESITY (BMI 30-39.9): ICD-10-CM

## 2023-02-10 LAB
B-HCG SERPL-ACNC: 4.23 MIU/ML
BASOPHILS # BLD AUTO: 0.01 THOUSANDS/ÂΜL (ref 0–0.1)
BASOPHILS NFR BLD AUTO: 0 % (ref 0–1)
EOSINOPHIL # BLD AUTO: 0.22 THOUSAND/ÂΜL (ref 0–0.61)
EOSINOPHIL NFR BLD AUTO: 3 % (ref 0–6)
ERYTHROCYTE [DISTWIDTH] IN BLOOD BY AUTOMATED COUNT: 12.5 % (ref 11.6–15.1)
FERRITIN SERPL-MCNC: 39 NG/ML (ref 8–388)
HCT VFR BLD AUTO: 43.5 % (ref 34.8–46.1)
HGB BLD-MCNC: 14.5 G/DL (ref 11.5–15.4)
IMM GRANULOCYTES # BLD AUTO: 0.02 THOUSAND/UL (ref 0–0.2)
IMM GRANULOCYTES NFR BLD AUTO: 0 % (ref 0–2)
IRON SATN MFR SERPL: 22 % (ref 15–50)
IRON SERPL-MCNC: 77 UG/DL (ref 50–170)
LYMPHOCYTES # BLD AUTO: 3.02 THOUSANDS/ÂΜL (ref 0.6–4.47)
LYMPHOCYTES NFR BLD AUTO: 39 % (ref 14–44)
MCH RBC QN AUTO: 32.3 PG (ref 26.8–34.3)
MCHC RBC AUTO-ENTMCNC: 33.3 G/DL (ref 31.4–37.4)
MCV RBC AUTO: 97 FL (ref 82–98)
MONOCYTES # BLD AUTO: 0.65 THOUSAND/ÂΜL (ref 0.17–1.22)
MONOCYTES NFR BLD AUTO: 8 % (ref 4–12)
NEUTROPHILS # BLD AUTO: 3.78 THOUSANDS/ÂΜL (ref 1.85–7.62)
NEUTS SEG NFR BLD AUTO: 50 % (ref 43–75)
NRBC BLD AUTO-RTO: 0 /100 WBCS
PLATELET # BLD AUTO: 364 THOUSANDS/UL (ref 149–390)
PMV BLD AUTO: 9.5 FL (ref 8.9–12.7)
RBC # BLD AUTO: 4.49 MILLION/UL (ref 3.81–5.12)
TIBC SERPL-MCNC: 350 UG/DL (ref 250–450)
TSH SERPL DL<=0.05 MIU/L-ACNC: 3.44 UIU/ML (ref 0.45–4.5)
WBC # BLD AUTO: 7.7 THOUSAND/UL (ref 4.31–10.16)

## 2023-02-10 RX ORDER — FLUTICASONE PROPIONATE AND SALMETEROL 100; 50 UG/1; UG/1
1 POWDER RESPIRATORY (INHALATION) 2 TIMES DAILY
Qty: 60 BLISTER | Refills: 5 | Status: SHIPPED | OUTPATIENT
Start: 2023-02-10 | End: 2023-08-09

## 2023-02-10 NOTE — PROGRESS NOTES
Name: Reagan Sánchez      : 1975      MRN: [id number]  Encounter Provider: EVAN Prasad  Encounter Date: 2/10/2023   Encounter department: [address] [place] [address]     1  Moderate persistent asthma with acute exacerbation  Assessment & Plan:  Currently stable, continue with current regimen : Advair bid, albuterol PRN, duo-neb treatments    Orders:  -     Fluticasone-Salmeterol (Advair Diskus) 100-50 mcg/dose inhaler; Inhale 1 puff 2 (two) times a day Rinse mouth after use  2  Missed period  Assessment & Plan:  Patient reports that she is 3 weeks overdue for her menses   Discussed that most likely beginning perimenopause but will check labs and for pregnancy to be safe     Orders:  -     CBC and differential; Future  -     Comprehensive metabolic panel; Future  -     Hemoglobin A1C; Future  -     TSH, 3rd generation with Free T4 reflex; Future  -     Iron Panel (Includes Ferritin, Iron Sat%, Iron, and TIBC); Future  -     hCG, quantitative; Future    3  Irregular menses  -     CBC and differential; Future  -     Comprehensive metabolic panel; Future  -     Hemoglobin A1C; Future  -     TSH, 3rd generation with Free T4 reflex; Future  -     Iron Panel (Includes Ferritin, Iron Sat%, Iron, and TIBC); Future  -     hCG, quantitative; Future    4  Elevated LFTs  -     Lipid panel; Future    5  Obesity (BMI 30-39 9)  -     Lipid panel; Future         Subjective     Reagan Sánchez is a 53 y o  female who  has a past medical history of Asthma and Gallstone who presented to the office today for follow up  She is accompanied by her partner  Patient is concerned today because her menses is 3 weeks late  States that she has never had a late menses  She is concerned about pregnancy as her and her partner are sexually active w/o condoms or birth control       Review of Systems   Constitutional: Negative for activity change, appetite change, chills, fatigue, fever and unexpected weight change  HENT: Negative for hearing loss, nosebleeds, sinus pain, sneezing, sore throat and trouble swallowing  Eyes: Negative for photophobia and visual disturbance  Respiratory: Negative for cough, chest tightness, shortness of breath and wheezing  Cardiovascular: Negative for chest pain, palpitations and leg swelling  Gastrointestinal: Negative for abdominal pain, constipation, nausea and vomiting  Genitourinary: Negative for decreased urine volume, difficulty urinating, dysuria, flank pain, genital sores, hematuria and urgency  Musculoskeletal: Negative for back pain and gait problem  Skin: Negative for pallor, rash and wound  Neurological: Negative for dizziness, seizures, syncope, weakness, numbness and headaches  Hematological: Negative for adenopathy  Does not bruise/bleed easily  Psychiatric/Behavioral: Negative for confusion, hallucinations, self-injury, sleep disturbance and suicidal ideas  The patient is not nervous/anxious          Past Medical History:   Diagnosis Date   • Asthma    • Gallstone    • Migraine      Past Surgical History:   Procedure Laterality Date   • CHOLECYSTECTOMY  2016     Family History   Problem Relation Age of Onset   • Asthma Mother    • No Known Problems Sister    • No Known Problems Maternal Grandmother    • No Known Problems Maternal Grandfather    • No Known Problems Paternal Grandmother    • No Known Problems Paternal Grandfather    • No Known Problems Maternal Aunt    • No Known Problems Paternal Aunt    • Breast cancer Paternal Aunt    • No Known Problems Paternal Aunt    • No Known Problems Paternal Aunt      Social History     Socioeconomic History   • Marital status: Single     Spouse name: None   • Number of children: None   • Years of education: None   • Highest education level: None   Occupational History   • None   Tobacco Use   • Smoking status: Every Day     Packs/day: 0 50     Types: Cigarettes   • Smokeless tobacco: Current   Vaping Use   • Vaping Use: Never used   Substance and Sexual Activity   • Alcohol use: Not Currently   • Drug use: Yes     Types: Marijuana   • Sexual activity: Not Currently   Other Topics Concern   • None   Social History Narrative   • None     Social Determinants of Health     Financial Resource Strain: Medium Risk   • Difficulty of Paying Living Expenses: Somewhat hard   Food Insecurity: No Food Insecurity   • Worried About Running Out of Food in the Last Year: Never true   • Ran Out of Food in the Last Year: Never true   Transportation Needs: No Transportation Needs   • Lack of Transportation (Medical): No   • Lack of Transportation (Non-Medical): No   Physical Activity: Not on file   Stress: Not on file   Social Connections: Not on file   Intimate Partner Violence: Not on file   Housing Stability: Not on file     Current Outpatient Medications on File Prior to Visit   Medication Sig   • albuterol (Ventolin HFA) 90 mcg/act inhaler Inhale 2 puffs every 6 (six) hours as needed for wheezing   • benzonatate (TESSALON) 200 MG capsule Take 1 capsule (200 mg total) by mouth 3 (three) times a day as needed for cough   • escitalopram (LEXAPRO) 10 mg tablet Take 1 tablet (10 mg total) by mouth daily   • hydrOXYzine HCL (ATARAX) 25 mg tablet Take 1 tablet (25 mg total) by mouth 3 (three) times a day as needed for anxiety   • ipratropium-albuterol (DUO-NEB) 0 5-2 5 mg/3 mL nebulizer solution Take 3 mL by nebulization 4 (four) times a day   • meclizine (ANTIVERT) 25 mg tablet Take 1 tablet (25 mg total) by mouth 3 (three) times a day as needed for dizziness   • naproxen (Naprosyn) 500 mg tablet Take 1 tablet (500 mg total) by mouth 2 (two) times a day with meals   • [DISCONTINUED] fluticasone-salmeterol (Advair HFA) 115-21 MCG/ACT inhaler Inhale 2 puffs 2 (two) times a day Rinse mouth after use       No Known Allergies  Immunization History   Administered Date(s) Administered   • COVID-19 Pfizer vac (Spencer-sucrose, gray cap) 12 yr+ IM 01/17/2022       Objective     /84 (BP Location: Left arm, Patient Position: Sitting, Cuff Size: Standard)   Pulse 83   Temp 97 6 °F (36 4 °C) (Temporal)   Resp 17   Ht 5' 2" (1 575 m)   Wt 76 1 kg (167 lb 12 8 oz)   SpO2 99%   BMI 30 69 kg/m²     Physical Exam  Vitals and nursing note reviewed  Constitutional:       General: She is not in acute distress  Appearance: She is well-developed  She is not diaphoretic  HENT:      Head: Normocephalic and atraumatic  Eyes:      Conjunctiva/sclera: Conjunctivae normal       Pupils: Pupils are equal, round, and reactive to light  Cardiovascular:      Rate and Rhythm: Normal rate and regular rhythm  Pulmonary:      Effort: Pulmonary effort is normal  No respiratory distress  Breath sounds: Normal breath sounds  No wheezing  Abdominal:      General: Bowel sounds are normal  There is no distension  Palpations: Abdomen is soft  Tenderness: There is no abdominal tenderness  There is no guarding or rebound  Musculoskeletal:         General: No deformity  Normal range of motion  Cervical back: Normal range of motion and neck supple  Lymphadenopathy:      Cervical: No cervical adenopathy  Skin:     General: Skin is warm and dry  Capillary Refill: Capillary refill takes less than 2 seconds  Findings: No rash  Neurological:      Mental Status: She is alert and oriented to person, place, and time  Sensory: No sensory deficit        Coordination: Coordination normal       Deep Tendon Reflexes: Reflexes normal    Psychiatric:         Behavior: Behavior normal        EVAN Cuenca

## 2023-02-10 NOTE — ASSESSMENT & PLAN NOTE
Patient reports that she is 3 weeks overdue for her menses   Discussed that most likely beginning perimenopause but will check labs and for pregnancy to be safe

## 2023-02-11 LAB
EST. AVERAGE GLUCOSE BLD GHB EST-MCNC: 111 MG/DL
HBA1C MFR BLD: 5.5 %

## 2023-02-13 ENCOUNTER — APPOINTMENT (OUTPATIENT)
Dept: LAB | Facility: CLINIC | Age: 48
End: 2023-02-13

## 2023-02-13 DIAGNOSIS — R79.89 ELEVATED LFTS: ICD-10-CM

## 2023-02-13 DIAGNOSIS — N92.6 MISSED PERIOD: Primary | ICD-10-CM

## 2023-02-13 DIAGNOSIS — E66.9 OBESITY (BMI 30-39.9): ICD-10-CM

## 2023-02-13 DIAGNOSIS — Z11.3 SCREENING FOR STD (SEXUALLY TRANSMITTED DISEASE): ICD-10-CM

## 2023-02-13 LAB
ALBUMIN SERPL BCP-MCNC: 3.6 G/DL (ref 3.5–5)
ALP SERPL-CCNC: 66 U/L (ref 46–116)
ALT SERPL W P-5'-P-CCNC: 24 U/L (ref 12–78)
ANION GAP SERPL CALCULATED.3IONS-SCNC: 7 MMOL/L (ref 4–13)
AST SERPL W P-5'-P-CCNC: 23 U/L (ref 5–45)
BILIRUB SERPL-MCNC: 0.44 MG/DL (ref 0.2–1)
BUN SERPL-MCNC: 13 MG/DL (ref 5–25)
CALCIUM SERPL-MCNC: 9.1 MG/DL (ref 8.3–10.1)
CHLORIDE SERPL-SCNC: 110 MMOL/L (ref 96–108)
CHOLEST SERPL-MCNC: 206 MG/DL
CO2 SERPL-SCNC: 25 MMOL/L (ref 21–32)
CREAT SERPL-MCNC: 0.9 MG/DL (ref 0.6–1.3)
GFR SERPL CREATININE-BSD FRML MDRD: 76 ML/MIN/1.73SQ M
GLUCOSE P FAST SERPL-MCNC: 89 MG/DL (ref 65–99)
HCV AB SER QL: NORMAL
HDLC SERPL-MCNC: 55 MG/DL
LDLC SERPL CALC-MCNC: 139 MG/DL (ref 0–100)
NONHDLC SERPL-MCNC: 151 MG/DL
POTASSIUM SERPL-SCNC: 4.2 MMOL/L (ref 3.5–5.3)
PROT SERPL-MCNC: 7.4 G/DL (ref 6.4–8.4)
SODIUM SERPL-SCNC: 142 MMOL/L (ref 135–147)
TRIGL SERPL-MCNC: 59 MG/DL

## 2023-03-07 ENCOUNTER — OFFICE VISIT (OUTPATIENT)
Dept: FAMILY MEDICINE CLINIC | Facility: CLINIC | Age: 48
End: 2023-03-07

## 2023-03-07 ENCOUNTER — APPOINTMENT (OUTPATIENT)
Dept: LAB | Facility: CLINIC | Age: 48
End: 2023-03-07

## 2023-03-07 VITALS
HEART RATE: 94 BPM | SYSTOLIC BLOOD PRESSURE: 108 MMHG | RESPIRATION RATE: 18 BRPM | OXYGEN SATURATION: 96 % | BODY MASS INDEX: 31.03 KG/M2 | HEIGHT: 62 IN | WEIGHT: 168.6 LBS | TEMPERATURE: 97.6 F | DIASTOLIC BLOOD PRESSURE: 76 MMHG

## 2023-03-07 DIAGNOSIS — N92.6 MISSED PERIOD: Primary | ICD-10-CM

## 2023-03-07 DIAGNOSIS — N92.6 MISSED PERIOD: ICD-10-CM

## 2023-03-07 DIAGNOSIS — J45.41 MODERATE PERSISTENT ASTHMA WITH ACUTE EXACERBATION: ICD-10-CM

## 2023-03-07 LAB — B-HCG SERPL-ACNC: 3 MIU/ML

## 2023-03-07 RX ORDER — PREDNISONE 20 MG/1
40 TABLET ORAL DAILY
Qty: 10 TABLET | Refills: 0 | Status: SHIPPED | OUTPATIENT
Start: 2023-03-07 | End: 2023-03-12

## 2023-03-07 NOTE — ASSESSMENT & PLAN NOTE
Mild diffused wheezing present  Continue with albuterol INH   Add short course of Prednisone 40 mg daily for 5days

## 2023-03-07 NOTE — ASSESSMENT & PLAN NOTE
Inconclusive HCG quantitative encouraged patient to obtain repeat  US to r/o malignancy   Missed period may be due to early menopause

## 2023-03-07 NOTE — LETTER
March 7, 2023     Patient: Isai Munguia  YOB: 1975  Date of Visit: 3/7/2023      To Whom it May Concern:    Isai Munguia is under my professional care  Rihcieluz Tab was seen in my office on 3/7/2023  Please excuse Shana for 3/7/2023  Marcy Barth may return to work on 3/8/2023  If you have any questions or concerns, please don't hesitate to call           Sincerely,          EVAN Wood        CC: No Recipients

## 2023-03-07 NOTE — PROGRESS NOTES
Name: Sima Coffman      : 1975      MRN: [id number]  Encounter Provider: EVAN Kumar  Encounter Date: 3/7/2023   Encounter department: [address] [place] [address]     1  Missed period  Assessment & Plan:  Inconclusive HCG quantitative encouraged patient to obtain repeat  US to r/o malignancy   Missed period may be due to early menopause  Orders:  -     US abdomen and pelvis with transvaginal; Future; Expected date: 2023    2  Moderate persistent asthma with acute exacerbation  Assessment & Plan:  Mild diffused wheezing present  Continue with albuterol INH   Add short course of Prednisone 40 mg daily for 5days    Orders:  -     predniSONE 20 mg tablet; Take 2 tablets (40 mg total) by mouth daily for 5 days         Subjective      Sima Coffman 53 y o  female  has a past medical history of Asthma, Gallstone, and Migraine  Presenting today for evaluation of missed period  Patient reports she is very concerned due to missing periods is not normal for her and this is her second visit with similar concern  Patient report she now is experiencing nausea as well  Prior HCG quantitative was 4 23 repeat was placed patient did not get it done yet  Nausea  This is a recurrent problem  The current episode started 1 to 4 weeks ago  The problem occurs daily  The problem has been unchanged  Associated symptoms include coughing (chronic) and nausea  Pertinent negatives include no abdominal pain, arthralgias, chest pain, chills, fatigue, fever, rash, sore throat, urinary symptoms or vomiting  The symptoms are aggravated by eating  She has tried nothing for the symptoms  Review of Systems   Constitutional: Negative for chills, fatigue and fever  HENT: Negative for ear pain and sore throat  Eyes: Negative for pain and visual disturbance  Respiratory: Positive for cough (chronic)  Negative for shortness of breath      Cardiovascular: Negative for chest pain and palpitations  Gastrointestinal: Positive for nausea  Negative for abdominal pain, constipation and vomiting  Genitourinary: Negative for dysuria and hematuria  Musculoskeletal: Negative for arthralgias and back pain  Skin: Negative for color change and rash  Neurological: Negative for seizures and syncope  All other systems reviewed and are negative  Current Outpatient Medications on File Prior to Visit   Medication Sig   • albuterol (Ventolin HFA) 90 mcg/act inhaler Inhale 2 puffs every 6 (six) hours as needed for wheezing   • benzonatate (TESSALON) 200 MG capsule Take 1 capsule (200 mg total) by mouth 3 (three) times a day as needed for cough   • escitalopram (LEXAPRO) 10 mg tablet Take 1 tablet (10 mg total) by mouth daily   • Fluticasone-Salmeterol (Advair Diskus) 100-50 mcg/dose inhaler Inhale 1 puff 2 (two) times a day Rinse mouth after use  • hydrOXYzine HCL (ATARAX) 25 mg tablet Take 1 tablet (25 mg total) by mouth 3 (three) times a day as needed for anxiety   • ipratropium-albuterol (DUO-NEB) 0 5-2 5 mg/3 mL nebulizer solution Take 3 mL by nebulization 4 (four) times a day   • meclizine (ANTIVERT) 25 mg tablet Take 1 tablet (25 mg total) by mouth 3 (three) times a day as needed for dizziness   • naproxen (Naprosyn) 500 mg tablet Take 1 tablet (500 mg total) by mouth 2 (two) times a day with meals       Objective     /76 (BP Location: Left arm, Patient Position: Sitting, Cuff Size: Standard)   Pulse 94   Temp 97 6 °F (36 4 °C) (Temporal)   Resp 18   Ht 5' 2" (1 575 m)   Wt 76 5 kg (168 lb 9 6 oz)   LMP  (LMP Unknown)   SpO2 96%   BMI 30 84 kg/m²     Physical Exam  Vitals and nursing note reviewed  Constitutional:       General: She is not in acute distress  Appearance: Normal appearance  She is not ill-appearing  HENT:      Head: Normocephalic and atraumatic        Right Ear: Tympanic membrane, ear canal and external ear normal       Left Ear: Tympanic membrane, ear canal and external ear normal       Nose: Nose normal       Mouth/Throat:      Mouth: Mucous membranes are moist    Eyes:      General:         Right eye: No discharge  Left eye: No discharge  Pupils: Pupils are equal, round, and reactive to light  Cardiovascular:      Rate and Rhythm: Normal rate and regular rhythm  Pulses: Normal pulses  Heart sounds: Normal heart sounds  Pulmonary:      Effort: Pulmonary effort is normal  No respiratory distress  Breath sounds: Wheezing present  Abdominal:      General: Bowel sounds are normal       Palpations: Abdomen is soft  Tenderness: There is no abdominal tenderness  There is no right CVA tenderness or left CVA tenderness  Musculoskeletal:         General: Normal range of motion  Cervical back: Normal range of motion  Skin:     General: Skin is warm and dry  Neurological:      General: No focal deficit present  Mental Status: She is alert and oriented to person, place, and time         EVAN Garcia

## 2023-03-09 ENCOUNTER — HOSPITAL ENCOUNTER (OUTPATIENT)
Dept: ULTRASOUND IMAGING | Facility: HOSPITAL | Age: 48
End: 2023-03-09

## 2023-03-09 DIAGNOSIS — N92.6 MISSED PERIOD: ICD-10-CM

## 2023-03-17 NOTE — RESULT ENCOUNTER NOTE
US - small cystic  scar, no other abnormality present, no presence of a pregnancy  As discussed absence of a menstrual period could be related to the beginning of menopause

## 2023-05-17 ENCOUNTER — HOSPITAL ENCOUNTER (EMERGENCY)
Facility: HOSPITAL | Age: 48
Discharge: HOME | End: 2023-05-17
Attending: EMERGENCY MEDICINE | Admitting: EMERGENCY MEDICINE

## 2023-05-17 ENCOUNTER — APPOINTMENT (EMERGENCY)
Dept: CT IMAGING | Facility: HOSPITAL | Age: 48
End: 2023-05-17

## 2023-05-17 VITALS
RESPIRATION RATE: 20 BRPM | DIASTOLIC BLOOD PRESSURE: 94 MMHG | WEIGHT: 178.8 LBS | SYSTOLIC BLOOD PRESSURE: 153 MMHG | TEMPERATURE: 98.1 F | OXYGEN SATURATION: 97 % | HEART RATE: 73 BPM | BODY MASS INDEX: 32.7 KG/M2

## 2023-05-17 DIAGNOSIS — J45.41 MODERATE PERSISTENT ASTHMA WITH ACUTE EXACERBATION: ICD-10-CM

## 2023-05-17 DIAGNOSIS — F32.A ANXIETY AND DEPRESSION: ICD-10-CM

## 2023-05-17 DIAGNOSIS — K02.9 DENTAL CARIES: ICD-10-CM

## 2023-05-17 DIAGNOSIS — L03.211 FACIAL CELLULITIS: Primary | ICD-10-CM

## 2023-05-17 DIAGNOSIS — F41.9 ANXIETY AND DEPRESSION: ICD-10-CM

## 2023-05-17 LAB
ALBUMIN SERPL BCP-MCNC: 4 G/DL (ref 3.5–5)
ALP SERPL-CCNC: 69 U/L (ref 34–104)
ALT SERPL W P-5'-P-CCNC: 12 U/L (ref 7–52)
ANION GAP SERPL CALCULATED.3IONS-SCNC: 5 MMOL/L (ref 4–13)
AST SERPL W P-5'-P-CCNC: 15 U/L (ref 13–39)
BASOPHILS # BLD AUTO: 0.02 THOUSANDS/ÂΜL (ref 0–0.1)
BASOPHILS NFR BLD AUTO: 0 % (ref 0–1)
BILIRUB SERPL-MCNC: 0.3 MG/DL (ref 0.2–1)
BUN SERPL-MCNC: 16 MG/DL (ref 5–25)
CALCIUM SERPL-MCNC: 8.5 MG/DL (ref 8.4–10.2)
CHLORIDE SERPL-SCNC: 108 MMOL/L (ref 96–108)
CO2 SERPL-SCNC: 26 MMOL/L (ref 21–32)
CREAT SERPL-MCNC: 1.02 MG/DL (ref 0.6–1.3)
EOSINOPHIL # BLD AUTO: 0.25 THOUSAND/ÂΜL (ref 0–0.61)
EOSINOPHIL NFR BLD AUTO: 3 % (ref 0–6)
ERYTHROCYTE [DISTWIDTH] IN BLOOD BY AUTOMATED COUNT: 12.9 % (ref 11.6–15.1)
GFR SERPL CREATININE-BSD FRML MDRD: 65 ML/MIN/1.73SQ M
GLUCOSE SERPL-MCNC: 102 MG/DL (ref 65–140)
HCT VFR BLD AUTO: 43 % (ref 34.8–46.1)
HGB BLD-MCNC: 14 G/DL (ref 11.5–15.4)
IMM GRANULOCYTES # BLD AUTO: 0.03 THOUSAND/UL (ref 0–0.2)
IMM GRANULOCYTES NFR BLD AUTO: 0 % (ref 0–2)
LYMPHOCYTES # BLD AUTO: 2 THOUSANDS/ÂΜL (ref 0.6–4.47)
LYMPHOCYTES NFR BLD AUTO: 26 % (ref 14–44)
MCH RBC QN AUTO: 31.3 PG (ref 26.8–34.3)
MCHC RBC AUTO-ENTMCNC: 32.6 G/DL (ref 31.4–37.4)
MCV RBC AUTO: 96 FL (ref 82–98)
MONOCYTES # BLD AUTO: 0.65 THOUSAND/ÂΜL (ref 0.17–1.22)
MONOCYTES NFR BLD AUTO: 8 % (ref 4–12)
NEUTROPHILS # BLD AUTO: 4.85 THOUSANDS/ÂΜL (ref 1.85–7.62)
NEUTS SEG NFR BLD AUTO: 63 % (ref 43–75)
NRBC BLD AUTO-RTO: 0 /100 WBCS
PLATELET # BLD AUTO: 306 THOUSANDS/UL (ref 149–390)
PMV BLD AUTO: 8.9 FL (ref 8.9–12.7)
POTASSIUM SERPL-SCNC: 3.7 MMOL/L (ref 3.5–5.3)
PROT SERPL-MCNC: 7 G/DL (ref 6.4–8.4)
RBC # BLD AUTO: 4.47 MILLION/UL (ref 3.81–5.12)
SODIUM SERPL-SCNC: 139 MMOL/L (ref 135–147)
WBC # BLD AUTO: 7.8 THOUSAND/UL (ref 4.31–10.16)

## 2023-05-17 RX ORDER — ESCITALOPRAM OXALATE 10 MG/1
10 TABLET ORAL DAILY
Qty: 30 TABLET | Refills: 0 | Status: SHIPPED | OUTPATIENT
Start: 2023-05-17

## 2023-05-17 RX ORDER — CLINDAMYCIN PHOSPHATE 600 MG/50ML
600 INJECTION INTRAVENOUS ONCE
Status: COMPLETED | OUTPATIENT
Start: 2023-05-17 | End: 2023-05-17

## 2023-05-17 RX ORDER — CLINDAMYCIN HYDROCHLORIDE 150 MG/1
450 CAPSULE ORAL 3 TIMES DAILY
Qty: 63 CAPSULE | Refills: 0 | Status: SHIPPED | OUTPATIENT
Start: 2023-05-17 | End: 2023-05-24

## 2023-05-17 RX ORDER — ALBUTEROL SULFATE 90 UG/1
2 AEROSOL, METERED RESPIRATORY (INHALATION) EVERY 6 HOURS PRN
Qty: 18 G | Refills: 0 | Status: SHIPPED | OUTPATIENT
Start: 2023-05-17

## 2023-05-17 RX ORDER — IPRATROPIUM BROMIDE AND ALBUTEROL SULFATE 2.5; .5 MG/3ML; MG/3ML
3 SOLUTION RESPIRATORY (INHALATION) 4 TIMES DAILY
Qty: 400 ML | Refills: 0 | Status: SHIPPED | OUTPATIENT
Start: 2023-05-17

## 2023-05-17 RX ORDER — FLUTICASONE PROPIONATE AND SALMETEROL 100; 50 UG/1; UG/1
1 POWDER RESPIRATORY (INHALATION) 2 TIMES DAILY
Qty: 60 BLISTER | Refills: 0 | Status: SHIPPED | OUTPATIENT
Start: 2023-05-17 | End: 2023-11-13

## 2023-05-17 RX ORDER — OXYCODONE HYDROCHLORIDE AND ACETAMINOPHEN 5; 325 MG/1; MG/1
1 TABLET ORAL EVERY 8 HOURS PRN
Qty: 9 TABLET | Refills: 0 | Status: SHIPPED | OUTPATIENT
Start: 2023-05-17 | End: 2023-05-20

## 2023-05-17 RX ORDER — BENZONATATE 200 MG/1
200 CAPSULE ORAL 3 TIMES DAILY PRN
Qty: 20 CAPSULE | Refills: 0 | Status: SHIPPED | OUTPATIENT
Start: 2023-05-17

## 2023-05-17 RX ORDER — MORPHINE SULFATE 4 MG/ML
4 INJECTION, SOLUTION INTRAMUSCULAR; INTRAVENOUS ONCE
Status: COMPLETED | OUTPATIENT
Start: 2023-05-17 | End: 2023-05-17

## 2023-05-17 RX ADMIN — SODIUM CHLORIDE 1000 ML: 0.9 INJECTION, SOLUTION INTRAVENOUS at 08:06

## 2023-05-17 RX ADMIN — IOHEXOL 100 ML: 350 INJECTION, SOLUTION INTRAVENOUS at 08:42

## 2023-05-17 RX ADMIN — CLINDAMYCIN PHOSPHATE 600 MG: 600 INJECTION, SOLUTION INTRAVENOUS at 08:12

## 2023-05-17 RX ADMIN — MORPHINE SULFATE 4 MG: 4 INJECTION, SOLUTION INTRAMUSCULAR; INTRAVENOUS at 08:09

## 2023-05-17 NOTE — DISCHARGE INSTRUCTIONS
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

## 2023-05-17 NOTE — Clinical Note
Cipriano Peck was seen and treated in our emergency department on 5/17/2023  Diagnosis:     Shana    She may return on this date: 05/20/2023         If you have any questions or concerns, please don't hesitate to call        Trav Aguilar DO    ______________________________           _______________          _______________  Hospital Representative                              Date                                Time

## 2023-05-17 NOTE — ED PROVIDER NOTES
"History  Chief Complaint   Patient presents with   • Dental Pain     Pt arrives c/o right side mouth pain for a couple days \" recently had antibiotics for this \"     Patient is a 50-year-old female history of asthma, gallstones, migraines  Presenting for right-sided facial pain and swelling  Patient notes that she has had poor dentition for a while  She states significant pain for the last 3 to 4 weeks  She went to see a dentist who told her she needed to have a tooth extracted, however the cost would be $2000 and she was not able to afford this  Patient was then placed on antibiotics  She states that the pain has been worsening over the last week, she notes that she woke up with swelling in her face this morning when pressing on her cheek she feels like she is having pus drained into her mouth  No fevers or chills no neck pain, no chest pain shortness of breath nausea vomiting diarrhea dysuria or frequency  She states that she was on antibiotics but cannot recall which one but has been at least 7 to 10 days since she is taken  Has been trying over-the-counter pain medication without relief  Prior to Admission Medications   Prescriptions Last Dose Informant Patient Reported?  Taking?   hydrOXYzine HCL (ATARAX) 25 mg tablet   No No   Sig: Take 1 tablet (25 mg total) by mouth 3 (three) times a day as needed for anxiety   meclizine (ANTIVERT) 25 mg tablet   No No   Sig: Take 1 tablet (25 mg total) by mouth 3 (three) times a day as needed for dizziness   naproxen (Naprosyn) 500 mg tablet   No No   Sig: Take 1 tablet (500 mg total) by mouth 2 (two) times a day with meals      Facility-Administered Medications: None       Past Medical History:   Diagnosis Date   • Asthma    • Gallstone    • Migraine        Past Surgical History:   Procedure Laterality Date   • CHOLECYSTECTOMY  2016       Family History   Problem Relation Age of Onset   • Asthma Mother    • No Known Problems Sister    • No Known Problems " Maternal Grandmother    • No Known Problems Maternal Grandfather    • No Known Problems Paternal Grandmother    • No Known Problems Paternal Grandfather    • No Known Problems Maternal Aunt    • No Known Problems Paternal Aunt    • Breast cancer Paternal Aunt    • No Known Problems Paternal Aunt    • No Known Problems Paternal Aunt      I have reviewed and agree with the history as documented  E-Cigarette/Vaping   • E-Cigarette Use Never User      E-Cigarette/Vaping Substances     Social History     Tobacco Use   • Smoking status: Every Day     Packs/day: 0 50     Types: Cigarettes   • Smokeless tobacco: Current   Vaping Use   • Vaping Use: Never used   Substance Use Topics   • Alcohol use: Not Currently   • Drug use: Not Currently     Types: Marijuana       Review of Systems   Constitutional: Negative  Negative for chills and fever  HENT: Positive for dental problem and facial swelling  Negative for rhinorrhea, sore throat, trouble swallowing and voice change  Eyes: Negative  Negative for pain and visual disturbance  Respiratory: Negative  Negative for cough, shortness of breath and wheezing  Cardiovascular: Negative  Negative for chest pain and palpitations  Gastrointestinal: Negative for abdominal pain, diarrhea, nausea and vomiting  Genitourinary: Negative  Negative for dysuria and frequency  Musculoskeletal: Negative  Negative for neck pain and neck stiffness  Skin: Negative  Negative for rash  Neurological: Negative  Negative for dizziness, speech difficulty, weakness, light-headedness and numbness  Physical Exam  Physical Exam  Vitals and nursing note reviewed  Constitutional:       General: She is not in acute distress  Appearance: She is well-developed  HENT:      Head: Normocephalic and atraumatic  Jaw: Tenderness present  No trismus  Mouth/Throat:      Comments: Dental caries and previous cavity fillings appreciated in the right upper molar area  No discernible abscess appreciated  Eyes:      Conjunctiva/sclera: Conjunctivae normal       Pupils: Pupils are equal, round, and reactive to light  Neck:      Trachea: No tracheal deviation  Cardiovascular:      Rate and Rhythm: Normal rate and regular rhythm  Pulmonary:      Effort: Pulmonary effort is normal  No respiratory distress  Breath sounds: Normal breath sounds  No wheezing or rales  Abdominal:      General: Bowel sounds are normal  There is no distension  Palpations: Abdomen is soft  Tenderness: There is no abdominal tenderness  There is no guarding or rebound  Musculoskeletal:         General: No tenderness or deformity  Normal range of motion  Cervical back: Normal range of motion and neck supple  Skin:     General: Skin is warm and dry  Capillary Refill: Capillary refill takes less than 2 seconds  Findings: No rash  Neurological:      Mental Status: She is alert and oriented to person, place, and time     Psychiatric:         Behavior: Behavior normal          Vital Signs  ED Triage Vitals   Temperature Pulse Respirations Blood Pressure SpO2   05/17/23 0720 05/17/23 0720 05/17/23 0720 05/17/23 0720 05/17/23 0720   98 1 °F (36 7 °C) 84 18 146/91 100 %      Temp Source Heart Rate Source Patient Position - Orthostatic VS BP Location FiO2 (%)   05/17/23 0720 05/17/23 0720 05/17/23 0720 05/17/23 0720 --   Oral Monitor Sitting Left arm       Pain Score       05/17/23 0809       9           Vitals:    05/17/23 0720 05/17/23 0905   BP: 146/91 153/94   Pulse: 84 73   Patient Position - Orthostatic VS: Sitting Sitting         Visual Acuity      ED Medications  Medications   sodium chloride 0 9 % bolus 1,000 mL (0 mL Intravenous Stopped 5/17/23 0919)   morphine injection 4 mg (4 mg Intravenous Given 5/17/23 0809)   clindamycin (CLEOCIN) IVPB (premix in dextrose) 600 mg 50 mL (0 mg Intravenous Stopped 5/17/23 0904)   iohexol (OMNIPAQUE) 350 MG/ML injection (SINGLE-DOSE) 100 mL (100 mL Intravenous Given 5/17/23 0842)       Diagnostic Studies  Results Reviewed     Procedure Component Value Units Date/Time    Comprehensive metabolic panel [[id number]] Collected: 05/17/23 0754    Lab Status: Final result Specimen: Blood from Arm, Left Updated: 05/17/23 0831     Sodium 139 mmol/L      Potassium 3 7 mmol/L      Chloride 108 mmol/L      CO2 26 mmol/L      ANION GAP 5 mmol/L      BUN 16 mg/dL      Creatinine 1 02 mg/dL      Glucose 102 mg/dL      Calcium 8 5 mg/dL      AST 15 U/L      ALT 12 U/L      Alkaline Phosphatase 69 U/L      Total Protein 7 0 g/dL      Albumin 4 0 g/dL      Total Bilirubin 0 30 mg/dL      eGFR 65 ml/min/1 73sq m     Narrative:      National Kidney Disease Foundation guidelines for Chronic Kidney Disease (CKD):   •  Stage 1 with normal or high GFR (GFR > 90 mL/min/1 73 square meters)  •  Stage 2 Mild CKD (GFR = 60-89 mL/min/1 73 square meters)  •  Stage 3A Moderate CKD (GFR = 45-59 mL/min/1 73 square meters)  •  Stage 3B Moderate CKD (GFR = 30-44 mL/min/1 73 square meters)  •  Stage 4 Severe CKD (GFR = 15-29 mL/min/1 73 square meters)  •  Stage 5 End Stage CKD (GFR <15 mL/min/1 73 square meters)  Note: GFR calculation is accurate only with a steady state creatinine    CBC and differential [[id number]] Collected: 05/17/23 0754    Lab Status: Final result Specimen: Blood from Arm, Left Updated: 05/17/23 0801     WBC 7 80 Thousand/uL      RBC 4 47 Million/uL      Hemoglobin 14 0 g/dL      Hematocrit 43 0 %      MCV 96 fL      MCH 31 3 pg      MCHC 32 6 g/dL      RDW 12 9 %      MPV 8 9 fL      Platelets 182 Thousands/uL      nRBC 0 /100 WBCs      Neutrophils Relative 63 %      Immat GRANS % 0 %      Lymphocytes Relative 26 %      Monocytes Relative 8 %      Eosinophils Relative 3 %      Basophils Relative 0 %      Neutrophils Absolute 4 85 Thousands/µL      Immature Grans Absolute 0 03 Thousand/uL      Lymphocytes Absolute 2 00 Thousands/µL      Monocytes Absolute 0 65 Thousand/µL      Eosinophils Absolute 0 25 Thousand/µL      Basophils Absolute 0 02 Thousands/µL                  CT facial bones with contrast   Final Result by Joel Michel MD (09/02 1210)      Findings suggestive of acute right facial cellulitis  No abscess  Odontogenic source of infection not excluded given periodontal disease  Small spiculated sclerotic bone lesion in left maxilla and right mandible, 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 0 Filed at: 05/17/2023 0729   2  How many drinks containing alcohol do you have on a typical day you are drinking? 0 Filed at: 05/17/2023 0729   3b  FEMALE Any Age, or MALE 65+: How often do you have 4 or more drinks on one occassion? 0 Filed at: 05/17/2023 0729   Audit-C Score 0 Filed at: 05/17/2023 3429   KEYON: How many times in the past year have you    Used an illegal drug or used a prescription medication for non-medical reasons? Never Filed at: 05/17/2023 2128                    Medical Decision Making  81-year-old female who presents for right-sided facial swelling and dental pain  Patient has had recent antibiotics which complicates her treatment pathway  She has no airway obstruction and no trismus  However has significant tenderness palpation of her cheek  Will need to have a CT scan to rule out deep space infection versus abscess in her right buccal area    Disposition will pend on need for surgical intervention versus second course of outpatient antibiotics and pain control  Patient was advised that ultimately she will need to have a procedure to correct her dental issues otherwise these symptoms will recur  Reviewed past medical records: yes, no history of facial abscess or surgeries noted     History Provided by: patient     Differential considered: facial cellulitis, dental abscess, ludwigs angina     Consideration of tests: CT ruled out abscess and deep space infection  Tolerated oral challenge in ED  Pain improved  Plan for outpatient oral antibiotics, pain control  Referred to [facility]  I have reviewed the patient's visit and any testing done in the emergency department  They have verbalized their understanding of any testing done today and have no further questions or concerns regarding their care in the emergency room  They will follow up with their primary care physician as well as with any specialist in their discharge instructions  Strict return precautions were discussed  Amount and/or Complexity of Data Reviewed  Labs: ordered  Radiology: ordered  Decision-making details documented in ED Course  Risk  Prescription drug management  Disposition  Final diagnoses:   Facial cellulitis   Dental caries     Time reflects when diagnosis was documented in both MDM as applicable and the Disposition within this note     Time User Action Codes Description Comment    5/17/2023  9:05 AM Moisés Mishra [R33 101] Facial cellulitis     5/17/2023  9:05 AM Moisés Mishra [K02 9] Dental caries       ED Disposition     ED Disposition   Discharge    Condition   Stable    Date/Time   Wed May 17, 2023  9:05 AM    Comment   Gela Page discharge to home/self care                 Follow-up Information     Follow up With Specialties Details Why Contact Info Additional [address]  Schedule an appointment as soon as possible for a visit   [address] In 1 week  [address], [address] [facility] [address]  [address], [address], [address] [place], [place], [address]          Discharge Medication List as of 5/17/2023  9:12 AM      START taking these medications    Details   clindamycin (CLEOCIN) 150 mg capsule Take 3 capsules (450 mg total) by mouth 3 (three) times a day for 7 days, Starting Wed 5/17/2023, Until Wed 5/24/2023, Normal      oxyCODONE-acetaminophen (Percocet) 5-325 mg per tablet Take 1 tablet by mouth every 8 (eight) hours as needed for moderate pain for up to 3 days Max Daily Amount: 3 tablets, Starting Wed 5/17/2023, Until Sat 5/20/2023 at 2359, Normal         CONTINUE these medications which have NOT CHANGED    Details   hydrOXYzine HCL (ATARAX) 25 mg tablet Take 1 tablet (25 mg total) by mouth 3 (three) times a day as needed for anxiety, Starting Wed 9/29/2021, Normal      meclizine (ANTIVERT) 25 mg tablet Take 1 tablet (25 mg total) by mouth 3 (three) times a day as needed for dizziness, Starting Wed 9/29/2021, Normal      naproxen (Naprosyn) 500 mg tablet Take 1 tablet (500 mg total) by mouth 2 (two) times a day with meals, Starting Mon 12/12/2022, Normal      albuterol (Ventolin HFA) 90 mcg/act inhaler Inhale 2 puffs every 6 (six) hours as needed for wheezing, Starting Thu 1/5/2023, Normal      benzonatate (TESSALON) 200 MG capsule Take 1 capsule (200 mg total) by mouth 3 (three) times a day as needed for cough, Starting Thu 1/5/2023, Normal      escitalopram (LEXAPRO) 10 mg tablet Take 1 tablet (10 mg total) by mouth daily, Starting Thu 1/5/2023, Normal      Fluticasone-Salmeterol (Advair Diskus) 100-50 mcg/dose inhaler Inhale 1 puff 2 (two) times a day Rinse mouth after use , Starting Fri 2/10/2023, Until Wed 8/9/2023, Normal      ipratropium-albuterol (DUO-NEB) 0 5-2 5 mg/3 mL nebulizer solution Take 3 mL by nebulization 4 (four) times a day, Starting Thu 1/5/2023, Normal             No discharge procedures on file      PDMP Review       Value Time User    PDMP Reviewed  Yes 5/17/2023  9:09 AM Sky Veronica DO          ED Provider  Electronically Signed by           Sky Veronica DO  05/17/23 8876

## 2023-05-17 NOTE — Clinical Note
Gia Melton was seen and treated in our emergency department on 5/17/2023  Diagnosis:     Shana    She may return on this date: 05/20/2023         If you have any questions or concerns, please don't hesitate to call        Kerri Sainz, DO    ______________________________           _______________          _______________  Hospital Representative                              Date                                Time

## 2023-05-19 ENCOUNTER — OFFICE VISIT (OUTPATIENT)
Dept: DENTISTRY | Facility: CLINIC | Age: 48
End: 2023-05-19

## 2023-05-19 VITALS — HEART RATE: 80 BPM | SYSTOLIC BLOOD PRESSURE: 121 MMHG | DIASTOLIC BLOOD PRESSURE: 83 MMHG

## 2023-05-19 DIAGNOSIS — K08.89 TOOTH PAIN: Primary | ICD-10-CM

## 2023-05-19 RX ORDER — IBUPROFEN 600 MG/1
600 TABLET ORAL EVERY 6 HOURS PRN
Qty: 20 TABLET | Refills: 0 | Status: SHIPPED | OUTPATIENT
Start: 2023-05-19 | End: 2023-05-24

## 2023-05-22 ENCOUNTER — TELEPHONE (OUTPATIENT)
Dept: DENTISTRY | Facility: CLINIC | Age: 48
End: 2023-05-22

## 2023-05-24 NOTE — PROGRESS NOTES
Limited Exam: Tooth Pain    Daniela Johns is a 54yo female who presented to [facility] clinic for tooth pain  PMHR, pt sig for:  · Tobacco use (started smoking at 17yo, 1/2 pack per day)  · Asthma  · Anxiety, Depression    ASA 2  BP = 121/83mmHg    Pain on biting, breathing, keeps up during night  EOE: Swollen right face  IOE: #3- existing O-amalgam  Distal portion of tooth is fractured    Palatal abscess assoc  With #3  Probing depth - 7mm on distal      #3 PAX: No caries visualized    RX: Motrin 600mg, Clindamycin 300mg    Referral: OMFS for ext #3  Suspected vertical root fracture   Causing 10/10 pain    NV: comp exam

## 2023-05-29 RX ORDER — CLINDAMYCIN HYDROCHLORIDE 300 MG/1
300 CAPSULE ORAL 3 TIMES DAILY
Qty: 21 CAPSULE | Refills: 0 | Status: SHIPPED | OUTPATIENT
Start: 2023-05-29 | End: 2023-06-05

## 2023-05-30 ENCOUNTER — NURSE TRIAGE (OUTPATIENT)
Dept: OTHER | Facility: OTHER | Age: 48
End: 2023-05-30

## 2023-05-30 DIAGNOSIS — K04.7 DENTAL INFECTION: Primary | ICD-10-CM

## 2023-05-30 RX ORDER — AMOXICILLIN 500 MG/1
500 CAPSULE ORAL 3 TIMES DAILY
Qty: 21 CAPSULE | Refills: 0 | Status: SHIPPED | OUTPATIENT
Start: 2023-05-30 | End: 2023-06-06

## 2023-05-30 NOTE — TELEPHONE ENCOUNTER
"  Reason for Disposition  • [1] SEVERE pain (e g , excruciating, unable to do any normal activities) AND [2] not improved 2 hours after pain medicine    Answer Assessment - Initial Assessment Questions  1  LOCATION: \"Which tooth is hurting? \"  (e g , right-side/left-side, upper/lower, front/back)        Tooth pain Right side upper side face is swollen    2  ONSET: \"When did the toothache start? \"  (e g , hours, days)        Awhile back    3  SEVERITY: \"How bad is the toothache? \"  (Scale 1-10; mild, moderate or severe)    - MILD (1-3): doesn't interfere with chewing     - MODERATE (4-7): interferes with chewing, interferes with normal activities, awakens from sleep      - SEVERE (8-10): unable to eat, unable to do any normal activities, excruciating pain          Rates pain a 7     4  SWELLING: \"Is there any visible swelling of your face? \"       Some swelling    Protocols used: TOOTHACHE-ADULT-AH    "

## 2023-05-30 NOTE — TELEPHONE ENCOUNTER
"Regarding: sick appointment  ----- Message from Yolanda Zamorano sent at 5/30/2023  6:23 PM EDT -----  \"I am having a lot of tooth pain I wanted to see if I could get a sick appointment I needs something  in the late afternoon I need to get a antibiotic'    "

## 2023-05-31 NOTE — TELEPHONE ENCOUNTER
Patient called in with upper right dental pain /infection & having severe pain  Waiting for treatment , appointment not until October   TT sent to on call  Per on call okay to send Amoxacillin 500 mg TID x 7 days  Sent to [facility] [address]  Please reach out to patient between 3pm - 4pm tomorrow to assist with sooner appointment for treatment

## 2023-08-18 ENCOUNTER — APPOINTMENT (EMERGENCY)
Dept: RADIOLOGY | Facility: HOSPITAL | Age: 48
End: 2023-08-18
Payer: OTHER MISCELLANEOUS

## 2023-08-18 ENCOUNTER — HOSPITAL ENCOUNTER (EMERGENCY)
Facility: HOSPITAL | Age: 48
Discharge: HOME | End: 2023-08-18
Attending: EMERGENCY MEDICINE
Payer: OTHER MISCELLANEOUS

## 2023-08-18 VITALS
BODY MASS INDEX: 32.86 KG/M2 | WEIGHT: 179.68 LBS | RESPIRATION RATE: 18 BRPM | OXYGEN SATURATION: 100 % | TEMPERATURE: 97.9 F | DIASTOLIC BLOOD PRESSURE: 92 MMHG | SYSTOLIC BLOOD PRESSURE: 143 MMHG | HEART RATE: 87 BPM

## 2023-08-18 DIAGNOSIS — S90.32XA CONTUSION OF LEFT FOOT, INITIAL ENCOUNTER: Primary | ICD-10-CM

## 2023-08-18 PROCEDURE — 96372 THER/PROPH/DIAG INJ SC/IM: CPT

## 2023-08-18 PROCEDURE — 90715 TDAP VACCINE 7 YRS/> IM: CPT

## 2023-08-18 PROCEDURE — 99283 EMERGENCY DEPT VISIT LOW MDM: CPT

## 2023-08-18 PROCEDURE — 99284 EMERGENCY DEPT VISIT MOD MDM: CPT | Performed by: EMERGENCY MEDICINE

## 2023-08-18 PROCEDURE — 90471 IMMUNIZATION ADMIN: CPT

## 2023-08-18 PROCEDURE — 73630 X-RAY EXAM OF FOOT: CPT

## 2023-08-18 RX ORDER — NAPROXEN 500 MG/1
500 TABLET ORAL 2 TIMES DAILY WITH MEALS
Qty: 30 TABLET | Refills: 0 | Status: SHIPPED | OUTPATIENT
Start: 2023-08-18 | End: 2023-08-28 | Stop reason: SDUPTHER

## 2023-08-18 RX ORDER — KETOROLAC TROMETHAMINE 30 MG/ML
15 INJECTION, SOLUTION INTRAMUSCULAR; INTRAVENOUS ONCE
Status: COMPLETED | OUTPATIENT
Start: 2023-08-18 | End: 2023-08-18

## 2023-08-18 RX ADMIN — KETOROLAC TROMETHAMINE 15 MG: 30 INJECTION, SOLUTION INTRAMUSCULAR; INTRAVENOUS at 14:26

## 2023-08-18 RX ADMIN — TETANUS TOXOID, REDUCED DIPHTHERIA TOXOID AND ACELLULAR PERTUSSIS VACCINE, ADSORBED 0.5 ML: 5; 2.5; 8; 8; 2.5 SUSPENSION INTRAMUSCULAR at 15:28

## 2023-08-18 NOTE — Clinical Note
Miya Valencia was seen and treated in our emergency department on 8/18/2023. Other - See Comments        Diagnosis:     Shana  . She may return on this date: 08/21/2023    Non-weight bearing until cleared by podiatry. If you have any questions or concerns, please don't hesitate to call.       Alem Gaona MD    ______________________________           _______________          _______________  Hospital Representative                              Date                                Time

## 2023-08-18 NOTE — Clinical Note
Erik Gupta was seen and treated in our emergency department on 8/18/2023. Other - See Comments        Diagnosis:     Shana  . She may return on this date: 08/21/2023    Non-weight bearing until cleared by podiatry. If you have any questions or concerns, please don't hesitate to call.       Gely Serrano MD    ______________________________           _______________          _______________  Hospital Representative                              Date                                Time

## 2023-08-18 NOTE — ED PROVIDER NOTES
History  Chief Complaint   Patient presents with   • Foot Injury     Patient was at work, fell off forklift, no headstrike, no thinners. Forklift kept moving and ran over left foot, coworker took "less than 5 minutes" to get forklift off foot. Pulses and sensation present. 54 yo female PMH of asthma presents after foot injury at work. 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 The forklift did not stop and ran over her left foot. She was wearing steel toed boots but it did pinch and apply pressure on her foot primarily between her big and second toe. They were able to get the forklift off within a few minutes. She endorses pain and tenderness at the foot. No other pain. Negative for headstrike or blood thinners. No shooting or radiating pain or numbness at site of injury. Prior to Admission Medications   Prescriptions Last Dose Informant Patient Reported? Taking? Fluticasone-Salmeterol (Advair Diskus) 100-50 mcg/dose inhaler   No No   Sig: Inhale 1 puff 2 (two) times a day Rinse mouth after use.    albuterol (Ventolin HFA) 90 mcg/act inhaler   No No   Sig: Inhale 2 puffs every 6 (six) hours as needed for wheezing   benzonatate (TESSALON) 200 MG capsule   No No   Sig: Take 1 capsule (200 mg total) by mouth 3 (three) times a day as needed for cough   escitalopram (LEXAPRO) 10 mg tablet   No No   Sig: Take 1 tablet (10 mg total) by mouth daily   hydrOXYzine HCL (ATARAX) 25 mg tablet   No No   Sig: Take 1 tablet (25 mg total) by mouth 3 (three) times a day as needed for anxiety   ibuprofen (MOTRIN) 600 mg tablet   No No   Sig: Take 1 tablet (600 mg total) by mouth every 6 (six) hours as needed for mild pain for up to 5 days   ipratropium-albuterol (DUO-NEB) 0.5-2.5 mg/3 mL nebulizer solution   No No   Sig: Take 3 mL by nebulization 4 (four) times a day   meclizine (ANTIVERT) 25 mg tablet   No No   Sig: Take 1 tablet (25 mg total) by mouth 3 (three) times a day as needed for dizziness   naproxen (Naprosyn) 500 mg tablet   No No   Sig: Take 1 tablet (500 mg total) by mouth 2 (two) times a day with meals      Facility-Administered Medications: None       Past Medical History:   Diagnosis Date   • Asthma    • Gallstone    • Migraine        Past Surgical History:   Procedure Laterality Date   • CHOLECYSTECTOMY  2016       Family History   Problem Relation Age of Onset   • Asthma Mother    • No Known Problems Sister    • No Known Problems Maternal Grandmother    • No Known Problems Maternal Grandfather    • No Known Problems Paternal Grandmother    • No Known Problems Paternal Grandfather    • No Known Problems Maternal Aunt    • No Known Problems Paternal Aunt    • Breast cancer Paternal Aunt    • No Known Problems Paternal Aunt    • No Known Problems Paternal Aunt      I have reviewed and agree with the history as documented. E-Cigarette/Vaping   • E-Cigarette Use Never User      E-Cigarette/Vaping Substances     Social History     Tobacco Use   • Smoking status: Every Day     Packs/day: 0.50     Types: Cigarettes   • Smokeless tobacco: Current   Vaping Use   • Vaping Use: Never used   Substance Use Topics   • Alcohol use: Not Currently   • Drug use: Not Currently     Types: Marijuana        Review of Systems   Constitutional: Negative for fatigue and fever. HENT: Positive for dental problem. Negative for trouble swallowing. 2 tooth extractions last week   Respiratory: Negative for chest tightness and shortness of breath. Cardiovascular: Negative for chest pain and palpitations. Gastrointestinal: Positive for diarrhea. Negative for constipation, nausea and vomiting. Soft stool from eating just stuff like pudding and liquids since dental work   Genitourinary: Negative for dysuria. Musculoskeletal: Positive for joint swelling. Negative for back pain.         Left foot pain from forklift   Neurological: Negative for dizziness, weakness, light-headedness and numbness. Psychiatric/Behavioral: Negative for confusion. Physical Exam  ED Triage Vitals   Temperature Pulse Respirations Blood Pressure SpO2   08/18/23 1337 08/18/23 1337 08/18/23 1337 08/18/23 1343 08/18/23 1343   97.9 °F (36.6 °C) 87 18 143/92 100 %      Temp Source Heart Rate Source Patient Position - Orthostatic VS BP Location FiO2 (%)   08/18/23 1337 08/18/23 1337 -- -- --   Oral Monitor         Pain Score       08/18/23 1426       8             Orthostatic Vital Signs  Vitals:    08/18/23 1337 08/18/23 1343   BP:  143/92   Pulse: 87        Physical Exam  Constitutional:       General: She is not in acute distress. Appearance: She is not ill-appearing. HENT:      Head: Normocephalic and atraumatic. Eyes:      Extraocular Movements: Extraocular movements intact. Cardiovascular:      Rate and Rhythm: Normal rate and regular rhythm. Pulses: Normal pulses. Heart sounds: Normal heart sounds. Pulmonary:      Effort: Pulmonary effort is normal.      Breath sounds: Normal breath sounds. Abdominal:      Tenderness: There is no abdominal tenderness. There is no guarding. Musculoskeletal:         General: Tenderness and signs of injury present. Right lower leg: No edema. Left lower leg: No edema. Comments: Left  plantar and dorsal surface mainly around big and second toe  Palpable pulse  No sensory deficit  Full ROM just tenderness on palpation and passive flexion of big toe   Skin:     Findings: Bruising present. Neurological:      Mental Status: She is alert and oriented to person, place, and time. Sensory: No sensory deficit. Motor: No weakness.          ED Medications  Medications   ketorolac (TORADOL) injection 15 mg (15 mg Intramuscular Given 8/18/23 1426)   tetanus-diphtheria-acellular pertussis (BOOSTRIX) IM injection 0.5 mL (0.5 mL Intramuscular Given 8/18/23 1528)       Diagnostic Studies  Results Reviewed     None                 XR foot 3+ views LEFT   Final Result by Darinel Sandhu MD (08/18 7614)      No acute osseous abnormality. Resident: Idris Hart, the attending radiologist, have reviewed the images and agree with the final report above. Workstation performed: [id number]               Procedures  Procedures      ED Course                                       Medical Decision Making  This was a mechanical accident. Patient was not dizzy. Since she was wearing steel toed boots, there is minimal injury. Patient will need to stay off the foot for a few days and should use ice, ibuprofen, and a splint. Xray of the foot ruled out fracture. Unable to determine last booster so will give TDaP here. Patient is stable for discharge with posterior short leg splint and crutches. She will follow up with podiatry outpatient. Cleared to return to work on 8/21 with restrictions as this is considered a crush injury. Risk  Prescription drug management. Disposition  Final diagnoses:   Contusion of left foot, initial encounter     Time reflects when diagnosis was documented in both MDM as applicable and the Disposition within this note     Time User Action Codes Description Comment    8/18/2023  3:23 PM John Saldana Add [S90.32XA] Contusion of left foot, initial encounter       ED Disposition     ED Disposition   Discharge    Condition   Stable    Date/Time   Fri Aug 18, 2023  3:34 PM    [address] discharge to home/self care. Follow-up Information    None         Patient's Medications   Discharge Prescriptions    No medications on file         PDMP Review       Value Time User    PDMP Reviewed  Yes 5/17/2023  9:09 AM Lyudmila Ly DO           ED Provider  Attending physically available and evaluated Erik Gupta. I managed the patient along with the ED Attending.     Electronically Signed by         Medina Hernandez MD  08/18/23 26 [id number]

## 2023-08-18 NOTE — DISCHARGE INSTRUCTIONS
Return to the ER if you have severe uncontrolled pain, numbness or tingling of foot or color change in the toes. We are always here and always happy to re-evaluate!

## 2023-08-18 NOTE — ED ATTENDING ATTESTATION
8/18/2023  ISamia MD, saw and evaluated the patient. I have discussed the patient with the resident/non-physician practitioner and agree with the resident's/non-physician practitioner's findings, Plan of Care, and MDM as documented in the resident's/non-physician practitioner's note, except where noted. All available labs and Radiology studies were reviewed. I was present for key portions of any procedure(s) performed by the resident/non-physician practitioner and I was immediately available to provide assistance. At this point I agree with the current assessment done in the Emergency Department. I have conducted an independent evaluation of this patient a history and physical is as follows:    52 yo female without significant PMH fell off fork lift and fork lift ran over left foot. Pt wearing steel toed boot at the time. No head strike or LOC. No blood thinners. Pt c/o pain in left foot/toes.       ED Course         Critical Care Time  Procedures

## 2023-08-24 ENCOUNTER — HOSPITAL ENCOUNTER (OUTPATIENT)
Dept: CT IMAGING | Facility: HOSPITAL | Age: 48
End: 2023-08-24
Payer: OTHER MISCELLANEOUS

## 2023-08-24 DIAGNOSIS — S97.82XA CRUSHING INJURY OF LEFT FOOT, INITIAL ENCOUNTER: ICD-10-CM

## 2023-08-24 PROCEDURE — 73700 CT LOWER EXTREMITY W/O DYE: CPT

## 2023-08-28 ENCOUNTER — OFFICE VISIT (OUTPATIENT)
Dept: OBGYN CLINIC | Facility: MEDICAL CENTER | Age: 48
End: 2023-08-28
Payer: OTHER MISCELLANEOUS

## 2023-08-28 VITALS — HEIGHT: 62 IN | BODY MASS INDEX: 32.94 KG/M2 | WEIGHT: 179 LBS

## 2023-08-28 DIAGNOSIS — S97.82XA CRUSHING INJURY OF LEFT FOOT, INITIAL ENCOUNTER: ICD-10-CM

## 2023-08-28 DIAGNOSIS — S90.32XA CONTUSION OF LEFT FOOT, INITIAL ENCOUNTER: Primary | ICD-10-CM

## 2023-08-28 DIAGNOSIS — S90.02XA CONTUSION OF LEFT ANKLE, INITIAL ENCOUNTER: ICD-10-CM

## 2023-08-28 PROCEDURE — 99244 OFF/OP CNSLTJ NEW/EST MOD 40: CPT | Performed by: EMERGENCY MEDICINE

## 2023-08-28 RX ORDER — CEPHALEXIN 500 MG/1
CAPSULE ORAL
COMMUNITY
Start: 2023-08-23

## 2023-08-28 RX ORDER — OXYCODONE HYDROCHLORIDE AND ACETAMINOPHEN 5; 325 MG/1; MG/1
TABLET ORAL
COMMUNITY
Start: 2023-08-23 | End: 2023-08-28 | Stop reason: SDUPTHER

## 2023-08-28 RX ORDER — NAPROXEN 500 MG/1
500 TABLET ORAL 2 TIMES DAILY WITH MEALS
Qty: 30 TABLET | Refills: 0 | Status: SHIPPED | OUTPATIENT
Start: 2023-08-28

## 2023-08-28 RX ORDER — OXYCODONE HYDROCHLORIDE AND ACETAMINOPHEN 5; 325 MG/1; MG/1
TABLET ORAL
Qty: 12 TABLET | Refills: 0 | Status: SHIPPED | OUTPATIENT
Start: 2023-08-28 | End: 2023-09-05 | Stop reason: SDUPTHER

## 2023-08-28 NOTE — LETTER
August 28, 2023     Patient: Anjali Allen  YOB: 1975  Date of Visit: 8/28/2023      To Whom it May Concern:    Anjali Allen is under my professional care. Yolanda Watkins was seen in my office on 8/28/2023. Work excuse until next appointment. F/U after MRIs. If you have any questions or concerns, please don't hesitate to call.          Sincerely,          Suzette Magaña MD        CC: No Recipients

## 2023-08-28 NOTE — LETTER
August 30, 2023     Shane Hargrove, [address] [facility]  [address]  [address] [place] [address]    Patient: Adilene Fernandez   YOB: 1975   Date of Visit: 8/28/2023       Dear Dr. Josie Duane: Thank you for referring Adilene Fernandez to me for evaluation. Below are my notes for this consultation. If you have questions, please do not hesitate to call me. I look forward to following your patient along with you. Sincerely,        Lachelle Almazan MD        CC: No Recipients    Lachelle Almazan MD  8/28/2023  3:53 PM  Signed      Assessment/Plan:    Diagnoses and all orders for this visit:    Contusion of left foot, initial encounter  -     MRI foot/forefoot toes left wo contrast; Future  -     MRI ankle/heel left  wo contrast; Future  -     naproxen (Naprosyn) 500 mg tablet; Take 1 tablet (500 mg total) by mouth 2 (two) times a day with meals  -     oxyCODONE-acetaminophen (PERCOCET) 5-325 mg per tablet; 1-2 tabs PO qHS PRN pain  -     Cam Boot    Contusion of left ankle, initial encounter  -     MRI foot/forefoot toes left wo contrast; Future  -     MRI ankle/heel left  wo contrast; Future  -     oxyCODONE-acetaminophen (PERCOCET) 5-325 mg per tablet; 1-2 tabs PO qHS PRN pain  -     Cam Boot    Crushing injury of left foot, initial encounter  -     MRI foot/forefoot toes left wo contrast; Future  -     MRI ankle/heel left  wo contrast; Future  -     oxyCODONE-acetaminophen (PERCOCET) 5-325 mg per tablet; 1-2 tabs PO qHS PRN pain  -     Cam Boot    Other orders  -     Discontinue: oxyCODONE-acetaminophen (PERCOCET) 5-325 mg per tablet; TAKE 1 TO 2 TABLETS EVERY 6 TO 8 HOURS AS NEEDED FOR SEVERE PAIN  -     cephalexin (KEFLEX) 500 mg capsule; TAKE 1 CAPSULE BY MOUTH FOUR TIMES A DAY UNTIL FINISHED    MRI Left ankle and foot after crush injury, evaluate for lisfranc ligamentous injury, as we are not able to obtain WB Xrays due to pain.     Out of work note provided  CAM boot provided  Refill on medications    Return for Follow Up After Imaging Study. Subjective:   Patient ID: Luke Beyer is a 53 y.o. female.  DOI 8/18/23    NP presents for LEFT ankle and foot injury occurring at work states that she fell to the ground and subsequent to this had a forklift rolled over her left foot and ankle. She states her foot and ankle were externally rotated while she was on the ground. Most of her pain is at the foot. She was evaluated in the ER x-rays were obtained she was subsequently seen by occupational medicine CAT scan of the foot was obtained and reviewed today. She has been utilizing a posterior splint and crutches. She states taking naproxen Does help her pain but her pain has been increased as of late because she has not been taking her pain medicines. She is also taking oxycodone at nighttime      Review of Systems    The following portions of the patient's chart were reviewed and updated as appropriate:    Allergy:  No Known Allergies    Medications:    Current Outpatient Medications:   •  albuterol (Ventolin HFA) 90 mcg/act inhaler, Inhale 2 puffs every 6 (six) hours as needed for wheezing, Disp: 18 g, Rfl: 0  •  benzonatate (TESSALON) 200 MG capsule, Take 1 capsule (200 mg total) by mouth 3 (three) times a day as needed for cough, Disp: 20 capsule, Rfl: 0  •  cephalexin (KEFLEX) 500 mg capsule, TAKE 1 CAPSULE BY MOUTH FOUR TIMES A DAY UNTIL FINISHED, Disp: , Rfl:   •  escitalopram (LEXAPRO) 10 mg tablet, Take 1 tablet (10 mg total) by mouth daily, Disp: 30 tablet, Rfl: 0  •  Fluticasone-Salmeterol (Advair Diskus) 100-50 mcg/dose inhaler, Inhale 1 puff 2 (two) times a day Rinse mouth after use., Disp: 60 blister, Rfl: 0  •  hydrOXYzine HCL (ATARAX) 25 mg tablet, Take 1 tablet (25 mg total) by mouth 3 (three) times a day as needed for anxiety, Disp: 40 tablet, Rfl: 0  •  ipratropium-albuterol (DUO-NEB) 0.5-2.5 mg/3 mL nebulizer solution, Take 3 mL by nebulization 4 (four) times a day, Disp: 400 mL, Rfl: 0  •  meclizine (ANTIVERT) 25 mg tablet, Take 1 tablet (25 mg total) by mouth 3 (three) times a day as needed for dizziness, Disp: 30 tablet, Rfl: 0  •  naproxen (Naprosyn) 500 mg tablet, Take 1 tablet (500 mg total) by mouth 2 (two) times a day with meals, Disp: 30 tablet, Rfl: 0  •  naproxen (Naprosyn) 500 mg tablet, Take 1 tablet (500 mg total) by mouth 2 (two) times a day with meals, Disp: 30 tablet, Rfl: 0  •  oxyCODONE-acetaminophen (PERCOCET) 5-325 mg per tablet, 1-2 tabs PO qHS PRN pain, Disp: 12 tablet, Rfl: 0  •  ibuprofen (MOTRIN) 600 mg tablet, Take 1 tablet (600 mg total) by mouth every 6 (six) hours as needed for mild pain for up to 5 days, Disp: 20 tablet, Rfl: 0    Patient Active Problem List   Diagnosis   • Abdominal pain   • Elevated LFTs   • Cholelithiasis   • Choledocholithiasis   • Tobacco use   • Asthma   • Anxiety and depression   • Dizziness   • Numbness and tingling in both hands   • Acute bilateral low back pain without sciatica   • Missed period   • Crushing injury of left foot   • Contusion of left ankle   • Contusion of left foot       Objective:  Ht 5' 2" (1.575 m)   Wt 81.2 kg (179 lb)   BMI 32.74 kg/m²     Left Ankle Exam     Other   Erythema: absent  Sensation: normal  Pulse: present    Comments:  Swelling of the mid to forefoot with ecchymosis dorsally and to a lesser extent medially. Her sensation is intact distally. Skin intact. There is ttp diffusely to light palpation including up into the ankle. Physical Exam      Neurologic Exam    Procedures    I have personally reviewed the written report of the pertinent studies. CT left foot without IV contrast     INDICATION: S97. 82XA: Crushing injury of left foot, initial encounter. COMPARISON: Correlation is made with the prior radiograph dated 8/18/2023. TECHNIQUE: CT examination of the above was performed.  This examination, like all CT scans performed in the [facility], was performed utilizing techniques to minimize radiation dose exposure, including the use of iterative reconstruction   and automated exposure control software. Multiplanar 2D reformatted images were created from the source data. Rad dose  184 mGy-cm     FINDINGS:     OSSEOUS STRUCTURES: There is a tiny cortical fragment adjacent to the anterolateral distal calcaneus likely representing a tiny avulsion fracture. There is adjacent soft tissue edema. There is deformity of the anterior process of the calcaneus which may be related to remote trauma. No other acute displaced fracture or dislocation. The Lisfranc alignment is maintained. Tiny well-corticated osseous fragment seen adjacent to the medial   malleolus may reflect sequela of remote trauma. VISUALIZED MUSCULATURE:  Unremarkable. SOFT TISSUES: Lateral subcutaneous edema about the ankle and midfoot     OTHER PERTINENT FINDINGS:  None. IMPRESSION:     Tiny avulsion fracture anterolateral distal calcaneus. Lateral subcutaneous edema.             Past Medical History:   Diagnosis Date   • Asthma    • Gallstone    • Migraine        Past Surgical History:   Procedure Laterality Date   • CHOLECYSTECTOMY  2016       Social History     Socioeconomic History   • Marital status: Single     Spouse name: Not on file   • Number of children: Not on file   • Years of education: Not on file   • Highest education level: Not on file   Occupational History   • Not on file   Tobacco Use   • Smoking status: Every Day     Packs/day: 0.50     Types: Cigarettes   • Smokeless tobacco: Current   Vaping Use   • Vaping Use: Never used   Substance and Sexual Activity   • Alcohol use: Not Currently   • Drug use: Not Currently     Types: Marijuana   • Sexual activity: Not Currently   Other Topics Concern   • Not on file   Social History Narrative   • Not on file     Social Determinants of Health     Financial Resource Strain: Medium Risk (2/10/2023)    Overall Financial Resource Strain (CARDIA)    • Difficulty of Paying Living Expenses: Somewhat hard   Food Insecurity: No Food Insecurity (2/10/2023)    Hunger Vital Sign    • Worried About Running Out of Food in the Last Year: Never true    • Ran Out of Food in the Last Year: Never true   Transportation Needs: No Transportation Needs (2/10/2023)    PRAPARE - Transportation    • Lack of Transportation (Medical): No    • Lack of Transportation (Non-Medical):  No   Physical Activity: Not on file   Stress: Not on file   Social Connections: Not on file   Intimate Partner Violence: Not on file   Housing Stability: Not on file       Family History   Problem Relation Age of Onset   • Asthma Mother    • No Known Problems Sister    • No Known Problems Maternal Grandmother    • No Known Problems Maternal Grandfather    • No Known Problems Paternal Grandmother    • No Known Problems Paternal Grandfather    • No Known Problems Maternal Aunt    • No Known Problems Paternal Aunt    • Breast cancer Paternal Aunt    • No Known Problems Paternal Aunt    • No Known Problems Paternal Aunt

## 2023-08-28 NOTE — PROGRESS NOTES
Assessment/Plan:    Diagnoses and all orders for this visit:    Contusion of left foot, initial encounter  -     MRI foot/forefoot toes left wo contrast; Future  -     MRI ankle/heel left  wo contrast; Future  -     naproxen (Naprosyn) 500 mg tablet; Take 1 tablet (500 mg total) by mouth 2 (two) times a day with meals  -     oxyCODONE-acetaminophen (PERCOCET) 5-325 mg per tablet; 1-2 tabs PO qHS PRN pain  -     Cam Boot    Contusion of left ankle, initial encounter  -     MRI foot/forefoot toes left wo contrast; Future  -     MRI ankle/heel left  wo contrast; Future  -     oxyCODONE-acetaminophen (PERCOCET) 5-325 mg per tablet; 1-2 tabs PO qHS PRN pain  -     Cam Boot    Crushing injury of left foot, initial encounter  -     MRI foot/forefoot toes left wo contrast; Future  -     MRI ankle/heel left  wo contrast; Future  -     oxyCODONE-acetaminophen (PERCOCET) 5-325 mg per tablet; 1-2 tabs PO qHS PRN pain  -     Cam Boot    Other orders  -     Discontinue: oxyCODONE-acetaminophen (PERCOCET) 5-325 mg per tablet; TAKE 1 TO 2 TABLETS EVERY 6 TO 8 HOURS AS NEEDED FOR SEVERE PAIN  -     cephalexin (KEFLEX) 500 mg capsule; TAKE 1 CAPSULE BY MOUTH FOUR TIMES A DAY UNTIL FINISHED    MRI Left ankle and foot after crush injury, evaluate for lisfranc ligamentous injury, as we are not able to obtain WB Xrays due to pain. Out of work note provided  CAM boot provided  Refill on medications    Return for Follow Up After Imaging Study. Subjective:   Patient ID: Angeles Mcduffie is a 53 y.o. female.  DOI 8/18/23    NP presents for LEFT ankle and foot injury occurring at work states that she fell to the ground and subsequent to this had a forklift rolled over her left foot and ankle. She states her foot and ankle were externally rotated while she was on the ground. Most of her pain is at the foot.   She was evaluated in the ER x-rays were obtained she was subsequently seen by occupational medicine CAT scan of the foot was obtained and reviewed today. She has been utilizing a posterior splint and crutches. She states taking naproxen Does help her pain but her pain has been increased as of late because she has not been taking her pain medicines. She is also taking oxycodone at nighttime      Review of Systems    The following portions of the patient's chart were reviewed and updated as appropriate:    Allergy:  No Known Allergies    Medications:    Current Outpatient Medications:   •  albuterol (Ventolin HFA) 90 mcg/act inhaler, Inhale 2 puffs every 6 (six) hours as needed for wheezing, Disp: 18 g, Rfl: 0  •  benzonatate (TESSALON) 200 MG capsule, Take 1 capsule (200 mg total) by mouth 3 (three) times a day as needed for cough, Disp: 20 capsule, Rfl: 0  •  cephalexin (KEFLEX) 500 mg capsule, TAKE 1 CAPSULE BY MOUTH FOUR TIMES A DAY UNTIL FINISHED, Disp: , Rfl:   •  escitalopram (LEXAPRO) 10 mg tablet, Take 1 tablet (10 mg total) by mouth daily, Disp: 30 tablet, Rfl: 0  •  Fluticasone-Salmeterol (Advair Diskus) 100-50 mcg/dose inhaler, Inhale 1 puff 2 (two) times a day Rinse mouth after use., Disp: 60 blister, Rfl: 0  •  hydrOXYzine HCL (ATARAX) 25 mg tablet, Take 1 tablet (25 mg total) by mouth 3 (three) times a day as needed for anxiety, Disp: 40 tablet, Rfl: 0  •  ipratropium-albuterol (DUO-NEB) 0.5-2.5 mg/3 mL nebulizer solution, Take 3 mL by nebulization 4 (four) times a day, Disp: 400 mL, Rfl: 0  •  meclizine (ANTIVERT) 25 mg tablet, Take 1 tablet (25 mg total) by mouth 3 (three) times a day as needed for dizziness, Disp: 30 tablet, Rfl: 0  •  naproxen (Naprosyn) 500 mg tablet, Take 1 tablet (500 mg total) by mouth 2 (two) times a day with meals, Disp: 30 tablet, Rfl: 0  •  naproxen (Naprosyn) 500 mg tablet, Take 1 tablet (500 mg total) by mouth 2 (two) times a day with meals, Disp: 30 tablet, Rfl: 0  •  oxyCODONE-acetaminophen (PERCOCET) 5-325 mg per tablet, 1-2 tabs PO qHS PRN pain, Disp: 12 tablet, Rfl: 0  •  ibuprofen (MOTRIN) 600 mg tablet, Take 1 tablet (600 mg total) by mouth every 6 (six) hours as needed for mild pain for up to 5 days, Disp: 20 tablet, Rfl: 0    Patient Active Problem List   Diagnosis   • Abdominal pain   • Elevated LFTs   • Cholelithiasis   • Choledocholithiasis   • Tobacco use   • Asthma   • Anxiety and depression   • Dizziness   • Numbness and tingling in both hands   • Acute bilateral low back pain without sciatica   • Missed period   • Crushing injury of left foot   • Contusion of left ankle   • Contusion of left foot       Objective:  Ht 5' 2" (1.575 m)   Wt 81.2 kg (179 lb)   BMI 32.74 kg/m²     Left Ankle Exam     Other   Erythema: absent  Sensation: normal  Pulse: present    Comments:  Swelling of the mid to forefoot with ecchymosis dorsally and to a lesser extent medially. Her sensation is intact distally. Skin intact. There is ttp diffusely to light palpation including up into the ankle. Physical Exam      Neurologic Exam    Procedures    I have personally reviewed the written report of the pertinent studies. CT left foot without IV contrast     INDICATION: S97. 82XA: Crushing injury of left foot, initial encounter.     COMPARISON: Correlation is made with the prior radiograph dated 8/18/2023.     TECHNIQUE: CT examination of the above was performed. This examination, like all CT scans performed in the [facility], was performed utilizing techniques to minimize radiation dose exposure, including the use of iterative reconstruction   and automated exposure control software. Multiplanar 2D reformatted images were created from the source data.     Rad dose  184 mGy-cm     FINDINGS:     OSSEOUS STRUCTURES: There is a tiny cortical fragment adjacent to the anterolateral distal calcaneus likely representing a tiny avulsion fracture.  There is adjacent soft tissue edema.     There is deformity of the anterior process of the calcaneus which may be related to remote trauma. No other acute displaced fracture or dislocation. The Lisfranc alignment is maintained. Tiny well-corticated osseous fragment seen adjacent to the medial   malleolus may reflect sequela of remote trauma.     VISUALIZED MUSCULATURE:  Unremarkable.     SOFT TISSUES: Lateral subcutaneous edema about the ankle and midfoot     OTHER PERTINENT FINDINGS:  None.     IMPRESSION:     Tiny avulsion fracture anterolateral distal calcaneus.     Lateral subcutaneous edema. Past Medical History:   Diagnosis Date   • Asthma    • Gallstone    • Migraine        Past Surgical History:   Procedure Laterality Date   • CHOLECYSTECTOMY  2016       Social History     Socioeconomic History   • Marital status: Single     Spouse name: Not on file   • Number of children: Not on file   • Years of education: Not on file   • Highest education level: Not on file   Occupational History   • Not on file   Tobacco Use   • Smoking status: Every Day     Packs/day: 0.50     Types: Cigarettes   • Smokeless tobacco: Current   Vaping Use   • Vaping Use: Never used   Substance and Sexual Activity   • Alcohol use: Not Currently   • Drug use: Not Currently     Types: Marijuana   • Sexual activity: Not Currently   Other Topics Concern   • Not on file   Social History Narrative   • Not on file     Social Determinants of Health     Financial Resource Strain: Medium Risk (2/10/2023)    Overall Financial Resource Strain (CARDIA)    • Difficulty of Paying Living Expenses: Somewhat hard   Food Insecurity: No Food Insecurity (2/10/2023)    Hunger Vital Sign    • Worried About Running Out of Food in the Last Year: Never true    • Ran Out of Food in the Last Year: Never true   Transportation Needs: No Transportation Needs (2/10/2023)    PRAPARE - Transportation    • Lack of Transportation (Medical): No    • Lack of Transportation (Non-Medical):  No   Physical Activity: Not on file   Stress: Not on file   Social Connections: Not on file   Intimate Partner Violence: Not on file   Housing Stability: Not on file       Family History   Problem Relation Age of Onset   • Asthma Mother    • No Known Problems Sister    • No Known Problems Maternal Grandmother    • No Known Problems Maternal Grandfather    • No Known Problems Paternal Grandmother    • No Known Problems Paternal Grandfather    • No Known Problems Maternal Aunt    • No Known Problems Paternal Aunt    • Breast cancer Paternal Aunt    • No Known Problems Paternal Aunt    • No Known Problems Paternal Aunt

## 2023-09-01 ENCOUNTER — HOSPITAL ENCOUNTER (OUTPATIENT)
Dept: MRI IMAGING | Facility: HOSPITAL | Age: 48
End: 2023-09-01
Payer: OTHER MISCELLANEOUS

## 2023-09-01 DIAGNOSIS — S97.82XA CRUSHING INJURY OF LEFT FOOT, INITIAL ENCOUNTER: ICD-10-CM

## 2023-09-01 DIAGNOSIS — S90.02XA CONTUSION OF LEFT ANKLE, INITIAL ENCOUNTER: ICD-10-CM

## 2023-09-01 DIAGNOSIS — S90.32XA CONTUSION OF LEFT FOOT, INITIAL ENCOUNTER: ICD-10-CM

## 2023-09-01 PROCEDURE — G1004 CDSM NDSC: HCPCS

## 2023-09-01 PROCEDURE — 73721 MRI JNT OF LWR EXTRE W/O DYE: CPT

## 2023-09-01 PROCEDURE — 73718 MRI LOWER EXTREMITY W/O DYE: CPT

## 2023-09-05 ENCOUNTER — OFFICE VISIT (OUTPATIENT)
Dept: OBGYN CLINIC | Facility: MEDICAL CENTER | Age: 48
End: 2023-09-05
Payer: OTHER MISCELLANEOUS

## 2023-09-05 ENCOUNTER — APPOINTMENT (OUTPATIENT)
Dept: RADIOLOGY | Facility: MEDICAL CENTER | Age: 48
End: 2023-09-05
Payer: COMMERCIAL

## 2023-09-05 VITALS
WEIGHT: 179 LBS | BODY MASS INDEX: 32.94 KG/M2 | HEIGHT: 62 IN | HEART RATE: 80 BPM | SYSTOLIC BLOOD PRESSURE: 134 MMHG | DIASTOLIC BLOOD PRESSURE: 86 MMHG

## 2023-09-05 DIAGNOSIS — S90.02XA CONTUSION OF LEFT ANKLE, INITIAL ENCOUNTER: ICD-10-CM

## 2023-09-05 DIAGNOSIS — S90.32XA CONTUSION OF LEFT FOOT, INITIAL ENCOUNTER: ICD-10-CM

## 2023-09-05 DIAGNOSIS — S97.82XA CRUSHING INJURY OF LEFT FOOT, INITIAL ENCOUNTER: ICD-10-CM

## 2023-09-05 DIAGNOSIS — S92.315D CLOSED NONDISPLACED FRACTURE OF FIRST METATARSAL BONE OF LEFT FOOT WITH ROUTINE HEALING, SUBSEQUENT ENCOUNTER: Primary | ICD-10-CM

## 2023-09-05 DIAGNOSIS — S92.032D CLOSED DISPLACED AVULSION FRACTURE OF TUBEROSITY OF LEFT CALCANEUS WITH ROUTINE HEALING, SUBSEQUENT ENCOUNTER: ICD-10-CM

## 2023-09-05 PROCEDURE — 99214 OFFICE O/P EST MOD 30 MIN: CPT | Performed by: EMERGENCY MEDICINE

## 2023-09-05 PROCEDURE — 73630 X-RAY EXAM OF FOOT: CPT

## 2023-09-05 PROCEDURE — 73610 X-RAY EXAM OF ANKLE: CPT

## 2023-09-05 RX ORDER — OXYCODONE HYDROCHLORIDE AND ACETAMINOPHEN 5; 325 MG/1; MG/1
TABLET ORAL
Qty: 12 TABLET | Refills: 0 | Status: SHIPPED | OUTPATIENT
Start: 2023-09-05

## 2023-09-05 NOTE — LETTER
September 5, 2023     Patient: Daniel Culver  YOB: 1975  Date of Visit: 9/5/2023      To Whom it May Concern:    Daniel Culver is under my professional care. Kaylah Moises was seen in my office on 9/5/2023. Work excuse until next appointment. Referred to Podiatry department. F/U with Dr. Clyde PURCELL. If you have any questions or concerns, please don't hesitate to call.          Sincerely,          Mitesh Rios MD        CC: No Recipients

## 2023-09-05 NOTE — PROGRESS NOTES
Assessment/Plan:    Diagnoses and all orders for this visit:    Closed nondisplaced fracture of first metatarsal bone of left foot with routine healing, subsequent encounter  -     Ambulatory Referral to Podiatry; Future  -     Ambulatory Referral to Physical Therapy; Future    Closed displaced avulsion fracture of tuberosity of left calcaneus with routine healing, subsequent encounter  -     Ambulatory Referral to Podiatry; Future  -     Ambulatory Referral to Physical Therapy; Future    Contusion of left foot, initial encounter  -     XR foot 3+ vw left; Future  -     XR ankle 3+ vw left; Future  -     Ambulatory Referral to Podiatry; Future  -     Ambulatory Referral to Physical Therapy; Future  -     oxyCODONE-acetaminophen (PERCOCET) 5-325 mg per tablet; 1-2 tabs PO qHS PRN pain    Contusion of left ankle, initial encounter  -     XR foot 3+ vw left; Future  -     XR ankle 3+ vw left; Future  -     Ambulatory Referral to Podiatry; Future  -     Ambulatory Referral to Physical Therapy; Future  -     oxyCODONE-acetaminophen (PERCOCET) 5-325 mg per tablet; 1-2 tabs PO qHS PRN pain    Crushing injury of left foot, initial encounter  -     XR foot 3+ vw left; Future  -     XR ankle 3+ vw left; Future  -     Ambulatory Referral to Podiatry; Future  -     Ambulatory Referral to Physical Therapy; Future  -     oxyCODONE-acetaminophen (PERCOCET) 5-325 mg per tablet; 1-2 tabs PO qHS PRN pain    Xrays Ankle and foot obtained today, patient unable to tolerate WB. No obvious fractures seen today to explain her pain. MRI results not yet available at time of D/C  Appreciate podiatry consult given amount of pain and swelling patient is experiencing. Discussed case with orthopedic surgeon, I do have concern for compartment syndrome however beings that she is already 2 weeks fasciotomy is not indicated. Discussed case with Podiatry who reviewed MRIs with no obvious injuries minus superficial edema.     Continue protected WB with CAM boot and crutches, out of work. Return if symptoms worsen or fail to improve. Subjective:   Patient ID: Edi Nicholson is a 53 y.o. female.  DOI 8/18/23    Patient returns with minimal improvement of symptoms, Pain 8/10 currently, she does note improvement of swelling when she keeps her foot elevated. Initial note:  NP presents for LEFT ankle and foot injury occurring at work states that she fell to the ground and subsequent to this had a forklift rolled over her left foot and ankle. She states her foot and ankle were externally rotated while she was on the ground. Most of her pain is at the foot. She was evaluated in the ER x-rays were obtained she was subsequently seen by occupational medicine CAT scan of the foot was obtained and reviewed today. She has been utilizing a posterior splint and crutches. She states taking naproxen Does help her pain but her pain has been increased as of late because she has not been taking her pain medicines. She is also taking oxycodone at nighttime      Review of Systems    The following portions of the patient's chart were reviewed and updated as appropriate:    Allergy:  No Known Allergies    Medications:    Current Outpatient Medications:   •  albuterol (Ventolin HFA) 90 mcg/act inhaler, Inhale 2 puffs every 6 (six) hours as needed for wheezing, Disp: 18 g, Rfl: 0  •  benzonatate (TESSALON) 200 MG capsule, Take 1 capsule (200 mg total) by mouth 3 (three) times a day as needed for cough, Disp: 20 capsule, Rfl: 0  •  cephalexin (KEFLEX) 500 mg capsule, TAKE 1 CAPSULE BY MOUTH FOUR TIMES A DAY UNTIL FINISHED, Disp: , Rfl:   •  escitalopram (LEXAPRO) 10 mg tablet, Take 1 tablet (10 mg total) by mouth daily, Disp: 30 tablet, Rfl: 0  •  Fluticasone-Salmeterol (Advair Diskus) 100-50 mcg/dose inhaler, Inhale 1 puff 2 (two) times a day Rinse mouth after use., Disp: 60 blister, Rfl: 0  •  hydrOXYzine HCL (ATARAX) 25 mg tablet, Take 1 tablet (25 mg total) by mouth 3 (three) times a day as needed for anxiety, Disp: 40 tablet, Rfl: 0  •  ipratropium-albuterol (DUO-NEB) 0.5-2.5 mg/3 mL nebulizer solution, Take 3 mL by nebulization 4 (four) times a day, Disp: 400 mL, Rfl: 0  •  meclizine (ANTIVERT) 25 mg tablet, Take 1 tablet (25 mg total) by mouth 3 (three) times a day as needed for dizziness, Disp: 30 tablet, Rfl: 0  •  naproxen (Naprosyn) 500 mg tablet, Take 1 tablet (500 mg total) by mouth 2 (two) times a day with meals, Disp: 30 tablet, Rfl: 0  •  naproxen (Naprosyn) 500 mg tablet, Take 1 tablet (500 mg total) by mouth 2 (two) times a day with meals, Disp: 30 tablet, Rfl: 0  •  oxyCODONE-acetaminophen (PERCOCET) 5-325 mg per tablet, 1-2 tabs PO qHS PRN pain, Disp: 12 tablet, Rfl: 0  •  ibuprofen (MOTRIN) 600 mg tablet, Take 1 tablet (600 mg total) by mouth every 6 (six) hours as needed for mild pain for up to 5 days, Disp: 20 tablet, Rfl: 0    Patient Active Problem List   Diagnosis   • Abdominal pain   • Elevated LFTs   • Cholelithiasis   • Choledocholithiasis   • Tobacco use   • Asthma   • Anxiety and depression   • Dizziness   • Numbness and tingling in both hands   • Acute bilateral low back pain without sciatica   • Missed period   • Crushing injury of left foot   • Contusion of left ankle   • Contusion of left foot       Objective:  /86   Pulse 80   Ht 5' 2" (1.575 m)   Wt 81.2 kg (179 lb)   BMI 32.74 kg/m²     Left Ankle Exam   Swelling: moderate    Other   Erythema: absent  Sensation: normal  Pulse: present    Comments:  Resolving ecchymosis  No plantar ecchymosis  She is diffusely tender to even light palpation            Physical Exam      Neurologic Exam    Procedures  X-rays of the left ankle and foot were obtained today patient unable to weight-bear      I have personally reviewed the written report and images of the pertinent studies.      MRI LEFT ANKLE and FOOT: pending      CT LE:  IMPRESSION:     Tiny avulsion fracture anterolateral distal calcaneus.     Lateral subcutaneous edema. Past Medical History:   Diagnosis Date   • Asthma    • Gallstone    • Migraine        Past Surgical History:   Procedure Laterality Date   • CHOLECYSTECTOMY  2016       Social History     Socioeconomic History   • Marital status: Single     Spouse name: Not on file   • Number of children: Not on file   • Years of education: Not on file   • Highest education level: Not on file   Occupational History   • Not on file   Tobacco Use   • Smoking status: Every Day     Packs/day: 0.50     Types: Cigarettes   • Smokeless tobacco: Current   Vaping Use   • Vaping Use: Never used   Substance and Sexual Activity   • Alcohol use: Not Currently   • Drug use: Not Currently     Types: Marijuana   • Sexual activity: Not Currently   Other Topics Concern   • Not on file   Social History Narrative   • Not on file     Social Determinants of Health     Financial Resource Strain: Medium Risk (2/10/2023)    Overall Financial Resource Strain (CARDIA)    • Difficulty of Paying Living Expenses: Somewhat hard   Food Insecurity: No Food Insecurity (2/10/2023)    Hunger Vital Sign    • Worried About Running Out of Food in the Last Year: Never true    • Ran Out of Food in the Last Year: Never true   Transportation Needs: No Transportation Needs (2/10/2023)    PRAPARE - Transportation    • Lack of Transportation (Medical): No    • Lack of Transportation (Non-Medical):  No   Physical Activity: Not on file   Stress: Not on file   Social Connections: Not on file   Intimate Partner Violence: Not on file   Housing Stability: Not on file       Family History   Problem Relation Age of Onset   • Asthma Mother    • No Known Problems Sister    • No Known Problems Maternal Grandmother    • No Known Problems Maternal Grandfather    • No Known Problems Paternal Grandmother    • No Known Problems Paternal Grandfather    • No Known Problems Maternal Aunt    • No Known Problems Paternal Aunt    • Breast cancer Paternal Aunt    • No Known Problems Paternal Aunt    • No Known Problems Paternal Aunt

## 2023-09-07 ENCOUNTER — TELEPHONE (OUTPATIENT)
Age: 48
End: 2023-09-07

## 2023-09-07 NOTE — TELEPHONE ENCOUNTER
Caller: [facility]    Doctor: Nita Ellis    Reason for call: Asked for us to fax over the PT order     Mari Bennett    [phone]

## 2023-09-07 NOTE — TELEPHONE ENCOUNTER
Caller: [facility]    Doctor: Calin Romero    Reason for call: called for office notes and work notes to be faxed over    Fax # [phone]      Office notes and work status were electronically faxed    Call back#: n/a

## 2023-09-08 ENCOUNTER — TELEPHONE (OUTPATIENT)
Age: 48
End: 2023-09-08

## 2023-09-08 NOTE — TELEPHONE ENCOUNTER
Caller: Shamar Captain from [facility]  Doctor: Adrienne Panchal    Reason for call: electronically faxed 9/5 OVN to fax # [phone]

## 2023-09-11 DIAGNOSIS — J45.41 MODERATE PERSISTENT ASTHMA WITH ACUTE EXACERBATION: ICD-10-CM

## 2023-09-13 RX ORDER — ALBUTEROL SULFATE 90 UG/1
2 AEROSOL, METERED RESPIRATORY (INHALATION) EVERY 6 HOURS PRN
Qty: 18 G | Refills: 0 | Status: SHIPPED | OUTPATIENT
Start: 2023-09-13

## 2023-09-13 RX ORDER — FLUTICASONE PROPIONATE AND SALMETEROL 100; 50 UG/1; UG/1
1 POWDER RESPIRATORY (INHALATION) 2 TIMES DAILY
Qty: 60 BLISTER | Refills: 0 | Status: SHIPPED | OUTPATIENT
Start: 2023-09-13 | End: 2024-03-11

## 2023-09-13 RX ORDER — IPRATROPIUM BROMIDE AND ALBUTEROL SULFATE 2.5; .5 MG/3ML; MG/3ML
3 SOLUTION RESPIRATORY (INHALATION) 4 TIMES DAILY
Qty: 400 ML | Refills: 0 | Status: SHIPPED | OUTPATIENT
Start: 2023-09-13

## 2023-09-13 RX ORDER — BENZONATATE 200 MG/1
200 CAPSULE ORAL 3 TIMES DAILY PRN
Qty: 20 CAPSULE | Refills: 0 | Status: SHIPPED | OUTPATIENT
Start: 2023-09-13

## 2023-09-19 ENCOUNTER — APPOINTMENT (OUTPATIENT)
Dept: RADIOLOGY | Facility: MEDICAL CENTER | Age: 48
End: 2023-09-19
Payer: COMMERCIAL

## 2023-09-19 ENCOUNTER — OFFICE VISIT (OUTPATIENT)
Dept: OBGYN CLINIC | Facility: MEDICAL CENTER | Age: 48
End: 2023-09-19
Payer: OTHER MISCELLANEOUS

## 2023-09-19 VITALS — BODY MASS INDEX: 32.94 KG/M2 | WEIGHT: 179 LBS | HEIGHT: 62 IN

## 2023-09-19 DIAGNOSIS — S90.02XD CONTUSION OF LEFT ANKLE, SUBSEQUENT ENCOUNTER: ICD-10-CM

## 2023-09-19 DIAGNOSIS — S92.345D CLOSED NONDISPLACED FRACTURE OF FOURTH METATARSAL BONE OF LEFT FOOT WITH ROUTINE HEALING, SUBSEQUENT ENCOUNTER: ICD-10-CM

## 2023-09-19 DIAGNOSIS — S92.315D CLOSED NONDISPLACED FRACTURE OF FIRST METATARSAL BONE OF LEFT FOOT WITH ROUTINE HEALING, SUBSEQUENT ENCOUNTER: ICD-10-CM

## 2023-09-19 DIAGNOSIS — S92.315D CLOSED NONDISPLACED FRACTURE OF FIRST METATARSAL BONE OF LEFT FOOT WITH ROUTINE HEALING, SUBSEQUENT ENCOUNTER: Primary | ICD-10-CM

## 2023-09-19 DIAGNOSIS — S92.032D CLOSED DISPLACED AVULSION FRACTURE OF TUBEROSITY OF LEFT CALCANEUS WITH ROUTINE HEALING, SUBSEQUENT ENCOUNTER: ICD-10-CM

## 2023-09-19 DIAGNOSIS — S92.335D CLOSED NONDISPLACED FRACTURE OF THIRD METATARSAL BONE OF LEFT FOOT WITH ROUTINE HEALING, SUBSEQUENT ENCOUNTER: ICD-10-CM

## 2023-09-19 PROCEDURE — 73630 X-RAY EXAM OF FOOT: CPT

## 2023-09-19 PROCEDURE — 99214 OFFICE O/P EST MOD 30 MIN: CPT | Performed by: EMERGENCY MEDICINE

## 2023-09-19 RX ORDER — MELOXICAM 15 MG/1
15 TABLET ORAL DAILY
Qty: 30 TABLET | Refills: 0 | Status: SHIPPED | OUTPATIENT
Start: 2023-09-19 | End: 2023-09-26

## 2023-09-19 NOTE — LETTER
September 19, 2023     Patient: Bassam Grady  YOB: 1975  Date of Visit: 9/19/2023      To Whom it May Concern:    Bassam Grady is under my professional care. Ron Mahmood was seen in my office on 9/19/2023. Work excuse until next appointment. F/U with Podiatry department. F/U with Dr. Pb PURCELL. If you have any questions or concerns, please don't hesitate to call.          Sincerely,          Daisy Maurice MD        CC: No Recipients

## 2023-09-19 NOTE — PROGRESS NOTES
Assessment/Plan:    Diagnoses and all orders for this visit:    Closed nondisplaced fracture of first metatarsal bone of left foot with routine healing, subsequent encounter  -     XR foot 3+ vw left; Future  -     meloxicam (MOBIC) 15 mg tablet; Take 1 tablet (15 mg total) by mouth daily    Closed displaced avulsion fracture of tuberosity of left calcaneus with routine healing, subsequent encounter  -     XR foot 3+ vw left; Future  -     meloxicam (MOBIC) 15 mg tablet; Take 1 tablet (15 mg total) by mouth daily    Closed nondisplaced fracture of third metatarsal bone of left foot with routine healing, subsequent encounter  -     XR foot 3+ vw left; Future  -     meloxicam (MOBIC) 15 mg tablet; Take 1 tablet (15 mg total) by mouth daily    Closed nondisplaced fracture of fourth metatarsal bone of left foot with routine healing, subsequent encounter  -     XR foot 3+ vw left; Future  -     meloxicam (MOBIC) 15 mg tablet; Take 1 tablet (15 mg total) by mouth daily    Contusion of left ankle, subsequent encounter  -     XR foot 3+ vw left; Future  -     meloxicam (MOBIC) 15 mg tablet; Take 1 tablet (15 mg total) by mouth daily    Repeat x-ray of the left foot no acute fracture or dislocation specifically no fractures of the first third or fourth metatarsal seen on MRI    Continue weightbearing as tolerated with the cam boot, continue physical therapy may progress as tolerated  Out of work note provided  Follow-up with podiatry as scheduled on 10/9    While taking Mobic (meloxicam) do not take any other NSAIDs such as Advil, Motrin, ibuprofen, Celebrex, naproxen or Aleve. However you may take Tylenol (acetaminophen). You may also take Tylenol 500mg every 4-6 hours as needed OR max 1,000mg per dose up to 3 times per day for a total of 3,000mg per day      Return if symptoms worsen or fail to improve. Subjective:   Patient ID: Bassam Grady is a 53 y.o. female.      DOI 8/18/23    Patient returns 1 month out from injury. She is scheduled with podiatry on 10/9. She has been out of work. She notes improved swelling and pain levels, which are still mostly medial forefoot. She notes yesterday she has burning of ankle at PT [facility] yesterday. Had about 7 sessions of PT. She cannot put pressure on foot. Previous note:  Patient returns with minimal improvement of symptoms, Pain 8/10 currently, she does note improvement of swelling when she keeps her foot elevated. Initial note:  NP presents for LEFT ankle and foot injury occurring at work states that she fell to the ground and subsequent to this had a forklift rolled over her left foot and ankle. She states her foot and ankle were externally rotated while she was on the ground. Most of her pain is at the foot. She was evaluated in the ER x-rays were obtained she was subsequently seen by occupational medicine CAT scan of the foot was obtained and reviewed today. She has been utilizing a posterior splint and crutches. She states taking naproxen Does help her pain but her pain has been increased as of late because she has not been taking her pain medicines. She is also taking oxycodone at nighttime      Review of Systems    The following portions of the patient's chart were reviewed and updated as appropriate:    Allergy:  No Known Allergies    Medications:    Current Outpatient Medications:   •  albuterol (Ventolin HFA) 90 mcg/act inhaler, Inhale 2 puffs every 6 (six) hours as needed for wheezing, Disp: 18 g, Rfl: 0  •  benzonatate (TESSALON) 200 MG capsule, Take 1 capsule (200 mg total) by mouth 3 (three) times a day as needed for cough, Disp: 20 capsule, Rfl: 0  •  cephalexin (KEFLEX) 500 mg capsule, TAKE 1 CAPSULE BY MOUTH FOUR TIMES A DAY UNTIL FINISHED, Disp: , Rfl:   •  escitalopram (LEXAPRO) 10 mg tablet, Take 1 tablet (10 mg total) by mouth daily, Disp: 30 tablet, Rfl: 0  •  Fluticasone-Salmeterol (Advair Diskus) 100-50 mcg/dose inhaler, Inhale 1 puff 2 (two) times a day Rinse mouth after use., Disp: 60 blister, Rfl: 0  •  hydrOXYzine HCL (ATARAX) 25 mg tablet, Take 1 tablet (25 mg total) by mouth 3 (three) times a day as needed for anxiety, Disp: 40 tablet, Rfl: 0  •  ipratropium-albuterol (DUO-NEB) 0.5-2.5 mg/3 mL nebulizer solution, Take 3 mL by nebulization 4 (four) times a day, Disp: 400 mL, Rfl: 0  •  meclizine (ANTIVERT) 25 mg tablet, Take 1 tablet (25 mg total) by mouth 3 (three) times a day as needed for dizziness, Disp: 30 tablet, Rfl: 0  •  meloxicam (MOBIC) 15 mg tablet, Take 1 tablet (15 mg total) by mouth daily, Disp: 30 tablet, Rfl: 0  •  naproxen (Naprosyn) 500 mg tablet, Take 1 tablet (500 mg total) by mouth 2 (two) times a day with meals, Disp: 30 tablet, Rfl: 0  •  oxyCODONE-acetaminophen (PERCOCET) 5-325 mg per tablet, 1-2 tabs PO qHS PRN pain, Disp: 12 tablet, Rfl: 0  •  ibuprofen (MOTRIN) 600 mg tablet, Take 1 tablet (600 mg total) by mouth every 6 (six) hours as needed for mild pain for up to 5 days, Disp: 20 tablet, Rfl: 0    Patient Active Problem List   Diagnosis   • Abdominal pain   • Elevated LFTs   • Cholelithiasis   • Choledocholithiasis   • Tobacco use   • Asthma   • Anxiety and depression   • Dizziness   • Numbness and tingling in both hands   • Acute bilateral low back pain without sciatica   • Missed period   • Crushing injury of left foot   • Contusion of left ankle   • Contusion of left foot       Objective:  Ht 5' 2" (1.575 m)   Wt 81.2 kg (179 lb)   BMI 32.74 kg/m²     Left Ankle Exam   Swelling: mild    Other   Erythema: absent  Sensation: normal  Pulse: present    Comments:  Healing superficial abrasions over the dorsal and dorsal medial aspect of the first metatarsal  Tenderness to palpation of the anterior ankle joint and ATFL  Also tenderness diffusely about the foot  No plantar ecchymosis            Physical Exam      Neurologic Exam    Procedures    I have personally reviewed the written report of the pertinent studies. Xrays Ankle and Foot    MRI Ankle and Foot  IMPRESSION:     Multifocal contusions across the metatarsals as described above.     Subtle incomplete fracture lines visualized through the third and fourth metatarsal necks as well as the midshaft of the first metatarsal and across the distal aspect of the great toe proximal phalanx.     Grade 1 sprain of the Lisfranc ligament.     Dorsal lateral subcutaneous edema.               Past Medical History:   Diagnosis Date   • Asthma    • Gallstone    • Migraine        Past Surgical History:   Procedure Laterality Date   • CHOLECYSTECTOMY  2016       Social History     Socioeconomic History   • Marital status: Single     Spouse name: Not on file   • Number of children: Not on file   • Years of education: Not on file   • Highest education level: Not on file   Occupational History   • Not on file   Tobacco Use   • Smoking status: Every Day     Packs/day: 0.50     Types: Cigarettes   • Smokeless tobacco: Current   Vaping Use   • Vaping Use: Never used   Substance and Sexual Activity   • Alcohol use: Not Currently   • Drug use: Not Currently     Types: Marijuana   • Sexual activity: Not Currently   Other Topics Concern   • Not on file   Social History Narrative   • Not on file     Social Determinants of Health     Financial Resource Strain: Medium Risk (2/10/2023)    Overall Financial Resource Strain (CARDIA)    • Difficulty of Paying Living Expenses: Somewhat hard   Food Insecurity: No Food Insecurity (2/10/2023)    Hunger Vital Sign    • Worried About Running Out of Food in the Last Year: Never true    • Ran Out of Food in the Last Year: Never true   Transportation Needs: No Transportation Needs (2/10/2023)    PRAPARE - Transportation    • Lack of Transportation (Medical): No    • Lack of Transportation (Non-Medical):  No   Physical Activity: Not on file   Stress: Not on file   Social Connections: Not on file   Intimate Partner Violence: Not on file   Housing Stability: Not on file       Family History   Problem Relation Age of Onset   • Asthma Mother    • No Known Problems Sister    • No Known Problems Maternal Grandmother    • No Known Problems Maternal Grandfather    • No Known Problems Paternal Grandmother    • No Known Problems Paternal Grandfather    • No Known Problems Maternal Aunt    • No Known Problems Paternal Aunt    • Breast cancer Paternal Aunt    • No Known Problems Paternal Aunt    • No Known Problems Paternal Aunt

## 2023-09-21 ENCOUNTER — TELEPHONE (OUTPATIENT)
Age: 48
End: 2023-09-21

## 2023-09-21 NOTE — TELEPHONE ENCOUNTER
Caller: [employer]    Doctor: Andrea Meyers    Reason for call: please print out 9/19 ov note and fax to Attn: Clm# [id number] at # [phone]     Call back#:

## 2023-09-22 ENCOUNTER — TELEPHONE (OUTPATIENT)
Age: 48
End: 2023-09-22

## 2023-09-22 NOTE — TELEPHONE ENCOUNTER
Caller: Patient     Doctor: Fabiana King    Reason for call: patient is calling stating she cannot take the meloxicam it is giving her constant headaches. Can something different be sent to her pharmacy?      Call back#: [phone]

## 2023-09-26 DIAGNOSIS — S92.315D CLOSED NONDISPLACED FRACTURE OF FIRST METATARSAL BONE OF LEFT FOOT WITH ROUTINE HEALING, SUBSEQUENT ENCOUNTER: Primary | ICD-10-CM

## 2023-09-26 NOTE — TELEPHONE ENCOUNTER
Called to inform patient (pharmacy has not called). Pt states she will try the diclofenac. Advised her to call back if there is any adverse reaction to that medication.

## 2023-10-05 ENCOUNTER — TELEPHONE (OUTPATIENT)
Age: 48
End: 2023-10-05

## 2023-10-05 NOTE — TELEPHONE ENCOUNTER
Called and spoke with pt and relayed Dr Wendi Cabral, and she would like to know if she can have an oxycodone prescription for pain as she has taken that before per patient.

## 2023-10-05 NOTE — TELEPHONE ENCOUNTER
Called and spoke with pt. Pt developed a rash on ear, neck and stomach after taking diclofenac. Pt stated she had stopped the medication for a week now and had been using benadryl for the itching was has helped. Pt now in pain unrelieved by advil or tylenol. Pt icing and elevating. Reports swelling goes down when lying down in bed but as soon as she gets up swelling appears. Last OV with Dr Nishant Blancas 9/19/23. Will be sending pictures of areas where rash was in mychart, pt notes skin is flaking.

## 2023-10-05 NOTE — TELEPHONE ENCOUNTER
Caller: Jhony Alba    Doctor: Verner Pilsner     Reason for call: Link Benton sodium - has been causing her serve itching, she had to buy benadryl and needs to know if you can switch her to another medication     Call back#: [phone]

## 2023-10-06 NOTE — TELEPHONE ENCOUNTER
Called and spoke with pt and relayed Dr Feroz López. Pt stated if she could get motrin, I reminded her due to her rash Dr Amy Zazueta recommended no NSAIDS. She does have a f/u with podietry and recommended she further talk to them about pain management and see what they would suggest. Phone call then was ended.

## 2023-10-06 NOTE — TELEPHONE ENCOUNTER
Caller: Patient    Doctor/Office: Zofia    Call regarding :  Returning call    Call was transferred to: triage nurse

## 2023-10-09 ENCOUNTER — OFFICE VISIT (OUTPATIENT)
Dept: PODIATRY | Facility: CLINIC | Age: 48
End: 2023-10-09
Payer: OTHER MISCELLANEOUS

## 2023-10-09 VITALS
SYSTOLIC BLOOD PRESSURE: 132 MMHG | WEIGHT: 179 LBS | HEIGHT: 62 IN | BODY MASS INDEX: 32.94 KG/M2 | DIASTOLIC BLOOD PRESSURE: 86 MMHG

## 2023-10-09 DIAGNOSIS — S92.032D CLOSED DISPLACED AVULSION FRACTURE OF TUBEROSITY OF LEFT CALCANEUS WITH ROUTINE HEALING, SUBSEQUENT ENCOUNTER: ICD-10-CM

## 2023-10-09 DIAGNOSIS — S90.02XA CONTUSION OF LEFT ANKLE, INITIAL ENCOUNTER: ICD-10-CM

## 2023-10-09 DIAGNOSIS — S84.92XA NEURAPRAXIA OF LEFT LOWER EXTREMITY, INITIAL ENCOUNTER: ICD-10-CM

## 2023-10-09 DIAGNOSIS — S92.315D CLOSED NONDISPLACED FRACTURE OF FIRST METATARSAL BONE OF LEFT FOOT WITH ROUTINE HEALING, SUBSEQUENT ENCOUNTER: ICD-10-CM

## 2023-10-09 DIAGNOSIS — S90.32XA CONTUSION OF LEFT FOOT, INITIAL ENCOUNTER: ICD-10-CM

## 2023-10-09 DIAGNOSIS — S97.82XA CRUSHING INJURY OF LEFT FOOT, INITIAL ENCOUNTER: Primary | ICD-10-CM

## 2023-10-09 PROCEDURE — 99204 OFFICE O/P NEW MOD 45 MIN: CPT | Performed by: PODIATRIST

## 2023-10-09 RX ORDER — CYCLOBENZAPRINE HCL 5 MG
5 TABLET ORAL 3 TIMES DAILY PRN
Qty: 30 TABLET | Refills: 0 | Status: SHIPPED | OUTPATIENT
Start: 2023-10-09 | End: 2023-10-24

## 2023-10-09 RX ORDER — GABAPENTIN 100 MG/1
100 CAPSULE ORAL
Qty: 30 CAPSULE | Refills: 2 | Status: SHIPPED | OUTPATIENT
Start: 2023-10-09

## 2023-10-09 NOTE — PROGRESS NOTES
Assessment/Plan:       Diagnoses and all orders for this visit:    Crushing injury of left foot, initial encounter  -     Ambulatory Referral to Podiatry  -     gabapentin (Neurontin) 100 mg capsule; Take 1 capsule (100 mg total) by mouth daily at bedtime  -     NM bone scan 3 phase; Future  -     cyclobenzaprine (FLEXERIL) 5 mg tablet; Take 1 tablet (5 mg total) by mouth 3 (three) times a day as needed for muscle spasms for up to 15 days    Contusion of left foot, initial encounter  -     Ambulatory Referral to Podiatry  -     cyclobenzaprine (FLEXERIL) 5 mg tablet; Take 1 tablet (5 mg total) by mouth 3 (three) times a day as needed for muscle spasms for up to 15 days    Contusion of left ankle, initial encounter  -     Ambulatory Referral to Podiatry  -     cyclobenzaprine (FLEXERIL) 5 mg tablet; Take 1 tablet (5 mg total) by mouth 3 (three) times a day as needed for muscle spasms for up to 15 days    Closed displaced avulsion fracture of tuberosity of left calcaneus with routine healing, subsequent encounter  -     Ambulatory Referral to Podiatry  -     cyclobenzaprine (FLEXERIL) 5 mg tablet; Take 1 tablet (5 mg total) by mouth 3 (three) times a day as needed for muscle spasms for up to 15 days    Closed nondisplaced fracture of first metatarsal bone of left foot with routine healing, subsequent encounter  -     Ambulatory Referral to Podiatry  -     cyclobenzaprine (FLEXERIL) 5 mg tablet; Take 1 tablet (5 mg total) by mouth 3 (three) times a day as needed for muscle spasms for up to 15 days    Neurapraxia of left lower extremity, initial encounter  -     gabapentin (Neurontin) 100 mg capsule; Take 1 capsule (100 mg total) by mouth daily at bedtime  -     NM bone scan 3 phase; Future  -     cyclobenzaprine (FLEXERIL) 5 mg tablet;  Take 1 tablet (5 mg total) by mouth 3 (three) times a day as needed for muscle spasms for up to 15 days      Diagnosis and options discussed with patient  Patient agreeable to the plan as stated below    Extensive chart review including patient's MRI and CT which I personally reviewed along with her x-ray images. I reviewed multiple visits with sports medicine. Patient had some small nondisplaced fractures from a crushing injury but I am very concerned to 2 months she is really not seeing much improvement in the pain. Crush injuries often can cause severe neuropraxia to the neuromuscular structures. Her first ray is very swollen discolored and painful. I am concerned with chronic nerve pain or even RSD. I am ordering a bone scan. I would like to treat her pain differently than narcotic medication. We discussed use of a muscle relaxer and gabapentin. I would like to see her in a few weeks both to review the bone scan as well as to see if the gabapentin is having any effect on her nerve pain. Regarding the physical therapy I would like her to mobilize her foot every day but I think it is too soon to expect her to perform strengthening and flexibility exercises of the foot given the high level of pain she is having. Subjective:      Patient ID: Edi Nicholson is a 53 y.o. female. PAtient had foot injury at work August 18 2023. A fork lift smashed her left foot. She had had a CT and MRI showing some broken bones. She has been in PT. She can walk in the CAM boot but if she's on the foot too much it starts hurting a lot. PT has been difficult      The following portions of the patient's history were reviewed and updated as appropriate: allergies, current medications, past family history, past medical history, past social history, past surgical history and problem list.    Review of Systems    Constitutional: Negative. Respiratory: Negative for cough and shortness of breath. Gastrointestinal: Negative for diarrhea, nausea and vomiting. Musculoskeletal: Crush injury left foot  Skin: Negative for rash or wound. Neurological: Negative for weakness, numbness and headaches. Objective:      /86   Ht 5' 2" (1.575 m)   Wt 81.2 kg (179 lb)   BMI 32.74 kg/m²          Physical Exam  Vitals reviewed. Constitutional:       Appearance: She is not ill-appearing or diaphoretic. Cardiovascular:      Rate and Rhythm: Normal rate. Pulses: Normal pulses. Pulmonary:      Effort: Pulmonary effort is normal. No respiratory distress. Musculoskeletal:         General: Swelling (Patient has severe pain and swelling surrounding the first ray of the left foot. In a dependent position the foot becomes more Guera and violaceous in color. She has hypersensitivity pain reaction with palpation throughout the entire forefoot.  ) and tenderness present. Left ankle: No swelling, deformity, ecchymosis or lacerations. No tenderness. Normal range of motion. Anterior drawer test negative. Normal pulse. Left Achilles Tendon: No tenderness or defects. Herr's test negative. Left foot: Decreased range of motion. Normal capillary refill. Swelling, tenderness and bony tenderness present. No deformity, bunion, Charcot foot, foot drop, prominent metatarsal heads, laceration or crepitus. Normal pulse. Skin:     Capillary Refill: Capillary refill takes less than 2 seconds. Findings: No erythema or rash. Neurological:      Mental Status: She is alert and oriented to person, place, and time. Sensory: No sensory deficit.       Gait: Gait normal.   Psychiatric:         Mood and Affect: Mood normal.

## 2023-10-09 NOTE — LETTER
October 9, 2023     Patient: Braulio Jacobs  YOB: 1975  Date of Visit: 10/9/2023      To Whom it May Concern:    Braulio Jacobs is under my professional care. Barbara Rizo was seen in my office on 10/9/2023. Barbara Rizo is still in severe pain following the crush injury to her foot. I am ordering a bone scan to rule out/in a condition called CRPS. I am also prescribing a muscle relaxer and a nerve medication. At this point she is in severe pain and cannot stand on her foot. I recommend she stay out of work. I am going to see her again in 4 weeks to check her progress. If you have any questions or concerns, please don't hesitate to call.          Sincerely,          Joon De Jesus DPM        CC: No Recipients

## 2023-10-10 ENCOUNTER — TELEPHONE (OUTPATIENT)
Age: 48
End: 2023-10-10

## 2023-10-10 ENCOUNTER — TELEPHONE (OUTPATIENT)
Dept: PODIATRY | Facility: CLINIC | Age: 48
End: 2023-10-10

## 2023-10-10 NOTE — TELEPHONE ENCOUNTER
Caller: Tiffany Jean/Aliyah    Doctor/Office: Eleno Torres    #: [phone] Lola Carranza      What needs to be faxed: Treatment notes from 10/9/23    ATTN to: Lola Carranza    Fax#: [phone]      Documents were successfully e-faxed (rubio) on 10/10/23

## 2023-10-10 NOTE — TELEPHONE ENCOUNTER
Caller: Marina Collier    Doctor: Lauren Vaughn    Reason for call: Needs appt  Notes  Faxed to her.     Call back#: Fax: [phone]

## 2023-10-10 NOTE — TELEPHONE ENCOUNTER
Caller: patient PT    Doctor/Office: Podiatry    Call regarding :  PT     Call was transferred to: Podiatry

## 2023-10-13 ENCOUNTER — TELEPHONE (OUTPATIENT)
Age: 48
End: 2023-10-13

## 2023-10-13 NOTE — TELEPHONE ENCOUNTER
Caller: [facility]    Doctor/Office: Dr. Altagracia Moreno    CB#: [phone]    Escalation: Care/Needs OV note faxed to her at [phone]. Any questions please call her back at number above.  Thanks

## 2023-10-19 ENCOUNTER — HOSPITAL ENCOUNTER (OUTPATIENT)
Dept: NUCLEAR MEDICINE | Facility: HOSPITAL | Age: 48
Discharge: HOME | End: 2023-10-19
Attending: PODIATRIST
Payer: COMMERCIAL

## 2023-10-19 DIAGNOSIS — S84.92XA NEURAPRAXIA OF LEFT LOWER EXTREMITY, INITIAL ENCOUNTER: ICD-10-CM

## 2023-10-19 DIAGNOSIS — S97.82XA CRUSHING INJURY OF LEFT FOOT, INITIAL ENCOUNTER: ICD-10-CM

## 2023-10-19 PROCEDURE — A9503 TC99M MEDRONATE: HCPCS

## 2023-10-19 PROCEDURE — G1004 CDSM NDSC: HCPCS

## 2023-10-19 PROCEDURE — 78315 BONE IMAGING 3 PHASE: CPT

## 2023-10-23 ENCOUNTER — TELEPHONE (OUTPATIENT)
Age: 48
End: 2023-10-23

## 2023-10-23 DIAGNOSIS — S97.82XA CRUSHING INJURY OF LEFT FOOT, INITIAL ENCOUNTER: ICD-10-CM

## 2023-10-23 DIAGNOSIS — S92.315D CLOSED NONDISPLACED FRACTURE OF FIRST METATARSAL BONE OF LEFT FOOT WITH ROUTINE HEALING, SUBSEQUENT ENCOUNTER: ICD-10-CM

## 2023-10-23 DIAGNOSIS — G90.522 COMPLEX REGIONAL PAIN SYNDROME TYPE 1 OF LEFT LOWER EXTREMITY: Primary | ICD-10-CM

## 2023-10-23 DIAGNOSIS — S90.32XA CONTUSION OF LEFT FOOT, INITIAL ENCOUNTER: ICD-10-CM

## 2023-10-23 DIAGNOSIS — S84.92XA NEURAPRAXIA OF LEFT LOWER EXTREMITY, INITIAL ENCOUNTER: ICD-10-CM

## 2023-10-23 DIAGNOSIS — S90.02XA CONTUSION OF LEFT ANKLE, INITIAL ENCOUNTER: ICD-10-CM

## 2023-10-23 DIAGNOSIS — S92.032D CLOSED DISPLACED AVULSION FRACTURE OF TUBEROSITY OF LEFT CALCANEUS WITH ROUTINE HEALING, SUBSEQUENT ENCOUNTER: ICD-10-CM

## 2023-10-23 RX ORDER — CYCLOBENZAPRINE HCL 5 MG
5 TABLET ORAL 3 TIMES DAILY PRN
Qty: 30 TABLET | Refills: 0 | Status: SHIPPED | OUTPATIENT
Start: 2023-10-23 | End: 2023-11-07

## 2023-10-23 NOTE — TELEPHONE ENCOUNTER
She has Gabapentin-is almost out of other RX which is Cyclobenzaprine which you prescribed last visit. She wants more of that prescribed please.  Thanks

## 2023-10-23 NOTE — TELEPHONE ENCOUNTER
Caller: Julisa Toro    Doctor/Office: Dr. Chavez Code    CB#: [phone]    Escalation: Care/Asking for new script for pt be faxed to [facility] at [phone]/after  writes and also asking for refill of muscle relaxer be sent to her pharmacy? Please call pt back and advise.  Thanks

## 2023-10-23 NOTE — TELEPHONE ENCOUNTER
Caller: Elmyra Epley    Doctor: Dr. Almas Sagastume    Reason for call: calling back as she changed her phone and did not give us the updated one to call her back about RX and script for PT.      Call back#: [phone]/correct/updated phone

## 2023-11-09 ENCOUNTER — TELEPHONE (OUTPATIENT)
Age: 48
End: 2023-11-09

## 2023-11-09 ENCOUNTER — OFFICE VISIT (OUTPATIENT)
Dept: PODIATRY | Facility: CLINIC | Age: 48
End: 2023-11-09
Payer: OTHER MISCELLANEOUS

## 2023-11-09 VITALS
HEART RATE: 101 BPM | HEIGHT: 62 IN | SYSTOLIC BLOOD PRESSURE: 126 MMHG | DIASTOLIC BLOOD PRESSURE: 86 MMHG | BODY MASS INDEX: 32.74 KG/M2

## 2023-11-09 DIAGNOSIS — G90.522 COMPLEX REGIONAL PAIN SYNDROME TYPE 1 OF LEFT LOWER EXTREMITY: Primary | ICD-10-CM

## 2023-11-09 DIAGNOSIS — S84.92XA NEURAPRAXIA OF LEFT LOWER EXTREMITY, INITIAL ENCOUNTER: ICD-10-CM

## 2023-11-09 PROCEDURE — 99213 OFFICE O/P EST LOW 20 MIN: CPT | Performed by: PODIATRIST

## 2023-11-09 NOTE — TELEPHONE ENCOUNTER
Caller: Juliet Alves    Doctor/Office: /[facility]#: [phone]    Escalation: Last office visit Patient requesting a note for work be placed in her mychart, She is unsure when she should be going back to work but needs a note to hand in. Please return call with questions. Thank you.

## 2023-11-09 NOTE — PROGRESS NOTES
Assessment/Plan:    No problem-specific Assessment & Plan notes found for this encounter. Diagnoses and all orders for this visit:    Complex regional pain syndrome type 1 of left lower extremity  -     Ambulatory referral to Spine & Pain Management; Future    Neurapraxia of left lower extremity, initial encounter  -     Ambulatory referral to Spine & Pain Management; Future      Patient has post-traumatic symptoms of CRPS according to the [place] Criteria. Positive sensory, vasomotor, and motor/trophic changes  Continuing pain to the area outside of normal response  NO other explanation for her severe allodynia and limb pain    Crush injuries can often leave to severe nerve reactions. Her small fracture is healed in the foot from the MRI last September. I am recommending she stop using the CAM boot, continue with PT. Needs daily ROM and tactile stimuli to the foot. I am also referring to pain management for CRPS    Subjective:      Patient ID: Ant Gaspar is a 53 y.o. female. 10/9/2023: Hodan Caal had foot injury at work August 18 2023. A fork lift smashed her left foot. She had had a CT and MRI showing some broken bones. She has been in PT. She can walk in the CAM boot but if she's on the foot too much it starts hurting a lot. PT has been difficult    11/9/2023: Patient has suspected nerve damage from a crush injury at work. A bone scan was ordered which showed unusual tracer activity in the foot that can suggest atypical CRPS. She had an MRI of the foot which showed healing fractures last September. Her foot is mottled and swollen globally. She is hypersensitive to touch. The following portions of the patient's history were reviewed and updated as appropriate: allergies, current medications, past family history, past medical history, past social history, past surgical history, and problem list.    Review of Systems    Constitutional: Negative.     Respiratory: Negative for cough and shortness of breath. Gastrointestinal: Negative for diarrhea, nausea and vomiting. Musculoskeletal: severe foot pain  Skin: Negative for rash or wound. Neurological: Negative for weakness, numbness and headaches. Objective:      /86   Pulse 101   Ht 5' 2" (1.575 m)   BMI 32.74 kg/m²          Physical Exam  Vitals reviewed. Constitutional:       Appearance: She is obese. She is not ill-appearing or diaphoretic. Cardiovascular:      Rate and Rhythm: Normal rate. Pulses: Normal pulses. Pulmonary:      Effort: Pulmonary effort is normal. No respiratory distress. Musculoskeletal:         General: Swelling, tenderness and signs of injury present. Skin:     Findings: No rash. Comments: Left foot is mottled compared to right. Edema noted. Neurological:      Mental Status: She is alert. Sensory: Sensory deficit (severe allodynia global left foot. Hyperesthesia noted) present. Motor: Weakness (little to no foot or ankle MMT on the left.) present.

## 2023-11-09 NOTE — LETTER
November 10, 2023     Patient: Anmol Broussard  YOB: 1975  Date of Visit: 11/9/2023      To Whom it May Concern:    Anmol Broussard is under my professional care. Sherren Brood was seen in my office on 11/9/2023. Sherren Brood is having severe pain and symptoms consistent with chronic regional pain syndrome. I am referring her to pain management as there is not much more I can provide from a podiatry approach. She should continue PT. With the severe pain she is currently unable to work. I have no to predict when she will be stable from a work perspective and will defer to pain management and physical therapy for future recommendations. If you have any questions or concerns, please don't hesitate to call.          Sincerely,          Alphonso Bauer DPM        CC: No Recipients

## 2023-11-10 ENCOUNTER — TELEPHONE (OUTPATIENT)
Age: 48
End: 2023-11-10

## 2023-11-10 NOTE — TELEPHONE ENCOUNTER
Caller: [employer]    Doctor/Office: Dr. Justin Eli    #: [phone]    Escalation: Last office visit Calling on behalf of Adilene Fernandez. Requesting office visit notes from 11-9 and work status be faxed to [phone] attn Claim number [id number].  Thank you

## 2023-11-10 NOTE — TELEPHONE ENCOUNTER
Caller: Patient- Bassam Grady    Doctor: Dr Estrada Conception    Reason for call: Patient is calling in stating that she was seen in the office yesterday 11/9/23 with the Dr and needs to have a work note stating how long she is to still be remaining out of work along with an approximate date of return. She called in yesterday but did not hear anything back relating this and she needs something to turn into her employer as she is currently on Workers Comp. She is asking for a call once completed to make her aware as she is wanting to access the note through [address] [place] [address].      Call back#: [phone]

## 2023-11-28 ENCOUNTER — CONSULT (OUTPATIENT)
Dept: PAIN MEDICINE | Facility: CLINIC | Age: 48
End: 2023-11-28
Payer: OTHER MISCELLANEOUS

## 2023-11-28 VITALS
HEIGHT: 62 IN | BODY MASS INDEX: 32.94 KG/M2 | DIASTOLIC BLOOD PRESSURE: 86 MMHG | WEIGHT: 179 LBS | SYSTOLIC BLOOD PRESSURE: 126 MMHG

## 2023-11-28 DIAGNOSIS — M79.2 NEUROPATHIC PAIN: ICD-10-CM

## 2023-11-28 DIAGNOSIS — G90.522 COMPLEX REGIONAL PAIN SYNDROME TYPE 1 OF LEFT LOWER EXTREMITY: Primary | ICD-10-CM

## 2023-11-28 PROCEDURE — 99244 OFF/OP CNSLTJ NEW/EST MOD 40: CPT | Performed by: ANESTHESIOLOGY

## 2023-11-28 RX ORDER — GABAPENTIN 100 MG/1
100 CAPSULE ORAL 3 TIMES DAILY
Qty: 90 CAPSULE | Refills: 1 | Status: SHIPPED | OUTPATIENT
Start: 2023-11-28

## 2023-11-28 NOTE — PROGRESS NOTES
Assessment  1. Complex regional pain syndrome type 1 of left lower extremity    2. Neuropathic pain        Plan    Patient presenting with chronic left foot pain after crush injury at work 8/16/23. Pain is consistent with CRPS I, neuropathic pain and accompanied by pain >7/10 on the pain scale with inability to participate in IADLs for >6 weeks. Patient has fully participated with physical therapy that is still ongoing. Patient does not fulfill the diagnosis for CRPS based on the Budapest criteria as follows: The patient has continued pain which is disproportionate to any inciting event _X_  The patient has at least one sign in 2 or more of the following categories _X_  The patient reports at least 1 symptom and 3 of more categories _X_  No other diagnosis can better explain the signs and symptoms _X_    Sensory: Allodynia to light touch and/or temperature sensation and/or deep somatic pressure and/or hyperalgesia to pinprick     Vasomotor: Temperature asymmetry and/or skin color changes and/or skin color asymmetry    Sudomotor: Edema and/or sweating changes and or sweating asymmetry    Motor/trophic: Decreased ROM and/or motor dysfunction (weakness, tremor, dystonia) and/or trophic changes (hair/nail/skin)    - Discussed increasing gabapentin. She is currently only taking 100mg nightly. It does cause her drowsiness, so I advised caution on increasing the dose and taking daytime doses. - Recommend she restart lidocaine topical cream to affected areas BID. - Would continue with PT specifically for CRPS. Will follow up in 1 month. If pain continues to be significant after 9-12 months discussed possibility of spinal cord stimulator. Reviewed external notes from the relevant aspects of the patient's medical record, specifically PCP, Orthopedics, Podiatry notes in regards to current and prior treatments tried (as mentioned in history of present illness).     Reviewed pertinent laboratory studies, specifically renal function, hemoglobin A1c, CBC, coagulation studies, prior to recommending medication therapies/interventional treatment options. [place] Prescription Drug Monitoring Program report was reviewed and was appropriate     My impressions and treatment recommendations were discussed in detail with the patient who verbalized understanding and had no further questions. Discharge instructions were provided. I personally saw and examined the patient and I agree with the above discussed plan of care. No orders of the defined types were placed in this encounter. New Medications Ordered This Visit   Medications    gabapentin (NEURONTIN) 100 mg capsule     Sig: Take 1 capsule (100 mg total) by mouth 3 (three) times a day     Dispense:  90 capsule     Refill:  1       History of Present Illness    Julisa Toro is a 53 y.o. female [facility] exam and evaluation of chronic left neuropathic foot pain for 4+ months. Pain started following an injury at work 8/16/23. Over the past month, the intensity of pain has been Moderate to severe. Pain is currently 5-6/10. Pain does interfere with age appropriate activities of daily living. Pain is nearly constant, worse in the evening/night. Pain is described as burning, cramping, sharp, pressure-like, throbbing with pins/needles. Patient denies weakness in the lower extremities. Assistance device used: None. Pain is increased with standing, bending, walking, exercise. Pain is decreased with lying down, resting. Treatments tried:   PT: yes  Injections: no   Anticoagulation: no    Medications tried:   Flexeril, NSAIDs, gabapentin, lidocaine    I have personally reviewed and/or updated the patient's past medical history, past surgical history, family history, social history, current medications, allergies, and vital signs today.      Review of Systems   Constitutional:  Negative for chills and fever.   HENT:  Negative for ear pain and sore throat. Eyes:  Negative for pain and visual disturbance. Respiratory:  Negative for cough and shortness of breath. Cardiovascular:  Negative for chest pain and palpitations. Gastrointestinal:  Negative for abdominal pain and vomiting. Genitourinary:  Negative for dysuria and hematuria. Musculoskeletal:  Positive for back pain, gait problem and myalgias. Negative for arthralgias. Skin:  Negative for color change and rash. Neurological:  Positive for weakness and numbness. Negative for seizures and syncope. All other systems reviewed and are negative.       Patient Active Problem List   Diagnosis    Abdominal pain    Elevated LFTs    Cholelithiasis    Choledocholithiasis    Tobacco use    Asthma    Anxiety and depression    Dizziness    Numbness and tingling in both hands    Acute bilateral low back pain without sciatica    Missed period    Crushing injury of left foot    Contusion of left ankle    Contusion of left foot       Past Medical History:   Diagnosis Date    Asthma     Gallstone     Migraine        Past Surgical History:   Procedure Laterality Date    CHOLECYSTECTOMY  2016       Family History   Problem Relation Age of Onset    Asthma Mother     No Known Problems Sister     No Known Problems Maternal Grandmother     No Known Problems Maternal Grandfather     No Known Problems Paternal Grandmother     No Known Problems Paternal Grandfather     No Known Problems Maternal Aunt     No Known Problems Paternal Aunt     Breast cancer Paternal Aunt     No Known Problems Paternal Aunt     No Known Problems Paternal Aunt        Social History     Occupational History    Not on file   Tobacco Use    Smoking status: Every Day     Packs/day: 0.50     Types: Cigarettes    Smokeless tobacco: Current   Vaping Use    Vaping Use: Never used   Substance and Sexual Activity    Alcohol use: Not Currently    Drug use: Not Currently     Types: Marijuana    Sexual activity: Not Currently       Current Outpatient Medications on File Prior to Visit   Medication Sig    escitalopram (LEXAPRO) 10 mg tablet Take 1 tablet (10 mg total) by mouth daily    Fluticasone-Salmeterol (Advair Diskus) 100-50 mcg/dose inhaler Inhale 1 puff 2 (two) times a day Rinse mouth after use.    hydrOXYzine HCL (ATARAX) 25 mg tablet Take 1 tablet (25 mg total) by mouth 3 (three) times a day as needed for anxiety    ipratropium-albuterol (DUO-NEB) 0.5-2.5 mg/3 mL nebulizer solution Take 3 mL by nebulization 4 (four) times a day    meclizine (ANTIVERT) 25 mg tablet Take 1 tablet (25 mg total) by mouth 3 (three) times a day as needed for dizziness    albuterol (Ventolin HFA) 90 mcg/act inhaler Inhale 2 puffs every 6 (six) hours as needed for wheezing (Patient not taking: Reported on 11/28/2023)    benzonatate (TESSALON) 200 MG capsule Take 1 capsule (200 mg total) by mouth 3 (three) times a day as needed for cough (Patient not taking: Reported on 11/28/2023)    cephalexin (KEFLEX) 500 mg capsule TAKE 1 CAPSULE BY MOUTH FOUR TIMES A DAY UNTIL FINISHED (Patient not taking: Reported on 11/28/2023)    cyclobenzaprine (FLEXERIL) 5 mg tablet Take 1 tablet (5 mg total) by mouth 3 (three) times a day as needed for muscle spasms for up to 15 days (Patient not taking: Reported on 11/28/2023)    diclofenac sodium (VOLTAREN) 50 mg EC tablet Take 1 tablet (50 mg total) by mouth 2 (two) times a day (Patient not taking: Reported on 11/28/2023)    ibuprofen (MOTRIN) 600 mg tablet Take 1 tablet (600 mg total) by mouth every 6 (six) hours as needed for mild pain for up to 5 days    oxyCODONE-acetaminophen (PERCOCET) 5-325 mg per tablet 1-2 tabs PO qHS PRN pain (Patient not taking: Reported on 11/28/2023)    [DISCONTINUED] gabapentin (Neurontin) 100 mg capsule Take 1 capsule (100 mg total) by mouth daily at bedtime (Patient not taking: Reported on 11/28/2023)     No current facility-administered medications on file prior to visit. No Known Allergies    Physical Exam    /86   Ht 5' 2" (1.575 m)   Wt 81.2 kg (179 lb)   BMI 32.74 kg/m²     Constitutional: normal, well developed, well nourished, alert, in no distress and non-toxic and no overt pain behavior. Eyes: anicteric  HEENT: grossly intact  Neck: supple, symmetric, trachea midline and no masses   Pulmonary:even and unlabored  Cardiovascular:No edema or pitting edema present  Skin:Normal without rashes or lesions and well hydrated  Psychiatric:Mood and affect appropriate  Neurologic: Weakness in left foot with dorsiflexion and plantarflexion. Musculoskeletal: Allodynia and hyperesthesia in the plantar and dorsal aspects of left foot. Mottling present in left foot with +edema.     Imaging

## 2023-11-29 ENCOUNTER — TELEPHONE (OUTPATIENT)
Age: 48
End: 2023-11-29

## 2023-11-29 DIAGNOSIS — M79.672 LEFT FOOT PAIN: Primary | ICD-10-CM

## 2023-11-29 NOTE — TELEPHONE ENCOUNTER
Caller: Patient     Doctor: Esperanza Corrigan     Reason for call: Patient calling stating letter keeping her out of work was to be put in the chart yesterday for HR and WC . It is to specify that she cannot go back to work until released.      Please advise     Call back#: [phone]

## 2023-12-01 NOTE — TELEPHONE ENCOUNTER
Caller: pt    Doctor: breanna    Reason for call: pt needs letter for work and HR. Please let pt know when letter is ready.  thanks    Call back#: [phone]

## 2023-12-04 ENCOUNTER — TELEPHONE (OUTPATIENT)
Age: 48
End: 2023-12-04

## 2023-12-04 NOTE — TELEPHONE ENCOUNTER
Caller: patient    Doctor: Marlyn Lucas    Reason for call: patient calling to request note for work, as she asked Dr. Musa Lewis office and was told they cannot provide the letter and to contact our office.  Please advise     Call back#: [phone]

## 2023-12-04 NOTE — TELEPHONE ENCOUNTER
Shaq Veronica MD  [facility] minutes ago (10:04 AM)       We can provide letters on days of of her office visits and/or procedures, but cannot keep her out of work indefinitely as stated above.

## 2023-12-04 NOTE — TELEPHONE ENCOUNTER
S/w pt and advised of same. Pt would like referral placed for functional capacity exam. Pt advised she would be contacted once order placed.

## 2023-12-04 NOTE — TELEPHONE ENCOUNTER
Shaq Veronica MD  [facility]; Milind Leslie minutes ago (10:33 AM)       Her condition from a Spine and Pain perspective is not a condition we take people out of work for. In fact from a Spine and Pain perspective the recommendation is aggressive therapy and to return to activities and normal use of the affected hand/foot  etc. as soon as possible. I am not treating her for an injury from an acute standpoint. If this condition is now deemed chronic it is not within my scope of practice to take her out of work indefinitely for this chronic condition. This would be vlad lar to a PCP taking someone out of work for their high blood pressure or diabetes, even if uncontrolled. As I stated above, if she does not agree with this we can refer her to have a functional capacity evaluation as within their scope of practice to  make these determinations.

## 2023-12-04 NOTE — TELEPHONE ENCOUNTER
Caller: Robina Hernandez, pt    Doctor: Maria Guadalupe Dupont    Reason for call: pt called stating she received the referral for OT and she was told they only do hands and arms    Call back#: [phone]

## 2023-12-04 NOTE — TELEPHONE ENCOUNTER
S/w Renetta Perkins at [facility] at [phone] and they do perform functional capacity exams there- The order in the chart needs to be for PT and include FCE and the reason why the patient needs this exam.

## 2023-12-04 NOTE — TELEPHONE ENCOUNTER
S/w pt and advised of same. Pt is upset. Per pt, at her [facility] WS s/w Dr Sean Hargrove and discussed that pt will not be able to work until her f/u appt. Pt stated that she was told that They would provide  a letter for her. Pt needs note for work and HR. Asked pt if she had r/o to Dr Sean Hargrove as this practice does not take pt's out of work nad in fact attempts to keep pt's working. Pt stated that WS is to write letter and asked to speak to manager.    Please advise

## 2023-12-05 ENCOUNTER — TELEPHONE (OUTPATIENT)
Age: 48
End: 2023-12-05

## 2023-12-05 NOTE — TELEPHONE ENCOUNTER
Caller: [employer]     Doctor/Office: Dr [facility]#: [phone]    Work or school note: Work note status for patient needed Claim#[id number] please advise     Return to work/school date: 11/28/2023    Fax #: [phone]    Is there any restrictions that need to be updated? If so, what?

## 2023-12-07 NOTE — TELEPHONE ENCOUNTER
Faxed office note & letter to [facility]. Claim # is [id number]. Advised office does not comment on work status.

## 2023-12-14 NOTE — TELEPHONE ENCOUNTER
Caller: Nino Vasquez from [facility]    Doctor: Freddie Bhandari    Reason for call: please fax over the PT referral to [phone].   Also notate Claim # [id number] when faxing    Call back#: n/a

## 2023-12-26 ENCOUNTER — OFFICE VISIT (OUTPATIENT)
Dept: PAIN MEDICINE | Facility: CLINIC | Age: 48
End: 2023-12-26
Payer: COMMERCIAL

## 2023-12-26 VITALS
BODY MASS INDEX: 32.94 KG/M2 | SYSTOLIC BLOOD PRESSURE: 114 MMHG | DIASTOLIC BLOOD PRESSURE: 74 MMHG | HEIGHT: 62 IN | WEIGHT: 179 LBS

## 2023-12-26 DIAGNOSIS — G90.522 COMPLEX REGIONAL PAIN SYNDROME TYPE 1 OF LEFT LOWER EXTREMITY: Primary | ICD-10-CM

## 2023-12-26 DIAGNOSIS — M79.2 NEUROPATHIC PAIN: ICD-10-CM

## 2023-12-26 PROCEDURE — 99214 OFFICE O/P EST MOD 30 MIN: CPT | Performed by: ANESTHESIOLOGY

## 2023-12-26 RX ORDER — PREGABALIN 25 MG/1
25 CAPSULE ORAL 3 TIMES DAILY
Qty: 90 CAPSULE | Refills: 1 | Status: SHIPPED | OUTPATIENT
Start: 2023-12-26

## 2023-12-26 NOTE — PROGRESS NOTES
Assessment:  1. Complex regional pain syndrome type 1 of left lower extremity    2. Neuropathic pain        Plan:    Patient presenting with chronic left foot pain after crush injury at work 8/16/23. Since her last visit, she does appear to be improving with time and PT. She is able to walk without assistance. Does also have an upcoming functional capacity evaluation.    Pain is consistent with CRPS I, neuropathic pain and accompanied by pain >7/10 on the pain scale with inability to participate in IADLs for >6 weeks. Patient has fully participated with physical therapy that is still ongoing.       Patient does not fulfill the diagnosis for CRPS based on the Budapest criteria as follows:     The patient has continued pain which is disproportionate to any inciting event _X_  The patient has at least one sign in 2 or more of the following categories _X_  The patient reports at least 1 symptom and 3 of more categories _X_  No other diagnosis can better explain the signs and symptoms _X_     Sensory: Allodynia to light touch and/or temperature sensation and/or deep somatic pressure and/or hyperalgesia to pinprick      Vasomotor: Temperature asymmetry and/or skin color changes and/or skin color asymmetry     Sudomotor: Edema and/or sweating changes and or sweating asymmetry     Motor/trophic: Decreased ROM and/or motor dysfunction (weakness, tremor, dystonia) and/or trophic changes (hair/nail/skin)     - Noticing some benefit with gabapentin 100mg TID but unable to accurately state as she is having sedation as well.    Will switch to Lyrica 25mg TID.     - Recommend she continue lidocaine topical cream to affected areas BID.     - Would continue with PT/OT for CRPS type picture.     Will follow up in 2 months.     Reviewed external notes from the relevant aspects of the patient's medical record, specifically PCP, Orthopedics, Podiatry notes in regards to current and prior treatments tried (as mentioned in history of  present illness).     Reviewed pertinent laboratory studies, specifically renal function, hemoglobin A1c, CBC, coagulation studies, prior to recommending medication therapies/interventional treatment options.     [place] Prescription Drug Monitoring Program report was reviewed and was appropriate      My impressions and treatment recommendations were discussed in detail with the patient who verbalized understanding and had no further questions.  Discharge instructions were provided. I personally saw and examined the patient and I agree with the above discussed plan of care.    No orders of the defined types were placed in this encounter.    New Medications Ordered This Visit   Medications    pregabalin (LYRICA) 25 mg capsule     Sig: Take 1 capsule (25 mg total) by mouth 3 (three) times a day     Dispense:  90 capsule     Refill:  1       History of Present Illness:  Shana Ceja is a 48 y.o. female who presents for a follow up office visit in regards to No chief complaint on file..   The patient’s current symptoms include slightly improved left foot pain symptoms that is currently rated 5/10 and described as an intermittent burning, dull/aching, sharp, cramping, pressure-like pain worse towards the evening.    I have personally reviewed and/or updated the patient's past medical history, past surgical history, family history, social history, current medications, allergies, and vital signs today.     Review of Systems   Constitutional:  Negative for chills and fever.   HENT:  Negative for ear pain and sore throat.    Eyes:  Negative for pain and visual disturbance.   Respiratory:  Negative for cough and shortness of breath.    Cardiovascular:  Negative for chest pain and palpitations.   Gastrointestinal:  Negative for abdominal pain and vomiting.   Genitourinary:  Negative for dysuria and hematuria.   Musculoskeletal:  Positive for back pain, gait problem and myalgias. Negative for arthralgias.   Skin:  Negative  for color change and rash.   Neurological:  Positive for weakness. Negative for seizures and syncope.   All other systems reviewed and are negative.      Patient Active Problem List   Diagnosis    Abdominal pain    Elevated LFTs    Cholelithiasis    Choledocholithiasis    Tobacco use    Asthma    Anxiety and depression    Dizziness    Numbness and tingling in both hands    Acute bilateral low back pain without sciatica    Missed period    Crushing injury of left foot    Contusion of left ankle    Contusion of left foot       Past Medical History:   Diagnosis Date    Asthma     Gallstone     Migraine        Past Surgical History:   Procedure Laterality Date    CHOLECYSTECTOMY  2016       Family History   Problem Relation Age of Onset    Asthma Mother     No Known Problems Sister     No Known Problems Maternal Grandmother     No Known Problems Maternal Grandfather     No Known Problems Paternal Grandmother     No Known Problems Paternal Grandfather     No Known Problems Maternal Aunt     No Known Problems Paternal Aunt     Breast cancer Paternal Aunt     No Known Problems Paternal Aunt     No Known Problems Paternal Aunt        Social History     Occupational History    Not on file   Tobacco Use    Smoking status: Every Day     Current packs/day: 0.50     Types: Cigarettes    Smokeless tobacco: Current   Vaping Use    Vaping status: Never Used   Substance and Sexual Activity    Alcohol use: Not Currently    Drug use: Not Currently     Types: Marijuana    Sexual activity: Not Currently       Current Outpatient Medications on File Prior to Visit   Medication Sig    escitalopram (LEXAPRO) 10 mg tablet Take 1 tablet (10 mg total) by mouth daily    Fluticasone-Salmeterol (Advair Diskus) 100-50 mcg/dose inhaler Inhale 1 puff 2 (two) times a day Rinse mouth after use.    hydrOXYzine HCL (ATARAX) 25 mg tablet Take 1 tablet (25 mg total) by mouth 3 (three) times a day as needed for anxiety    ipratropium-albuterol (DUO-NEB)  "0.5-2.5 mg/3 mL nebulizer solution Take 3 mL by nebulization 4 (four) times a day    meclizine (ANTIVERT) 25 mg tablet Take 1 tablet (25 mg total) by mouth 3 (three) times a day as needed for dizziness    [DISCONTINUED] gabapentin (NEURONTIN) 100 mg capsule Take 1 capsule (100 mg total) by mouth 3 (three) times a day    albuterol (Ventolin HFA) 90 mcg/act inhaler Inhale 2 puffs every 6 (six) hours as needed for wheezing (Patient not taking: Reported on 11/28/2023)    benzonatate (TESSALON) 200 MG capsule Take 1 capsule (200 mg total) by mouth 3 (three) times a day as needed for cough (Patient not taking: Reported on 11/28/2023)    cephalexin (KEFLEX) 500 mg capsule TAKE 1 CAPSULE BY MOUTH FOUR TIMES A DAY UNTIL FINISHED (Patient not taking: Reported on 11/28/2023)    cyclobenzaprine (FLEXERIL) 5 mg tablet Take 1 tablet (5 mg total) by mouth 3 (three) times a day as needed for muscle spasms for up to 15 days (Patient not taking: Reported on 11/28/2023)    diclofenac sodium (VOLTAREN) 50 mg EC tablet Take 1 tablet (50 mg total) by mouth 2 (two) times a day (Patient not taking: Reported on 11/28/2023)    ibuprofen (MOTRIN) 600 mg tablet Take 1 tablet (600 mg total) by mouth every 6 (six) hours as needed for mild pain for up to 5 days    oxyCODONE-acetaminophen (PERCOCET) 5-325 mg per tablet 1-2 tabs PO qHS PRN pain (Patient not taking: Reported on 11/28/2023)     No current facility-administered medications on file prior to visit.       No Known Allergies    Physical Exam:    /74   Ht 5' 2\" (1.575 m)   Wt 81.2 kg (179 lb)   BMI 32.74 kg/m²     Constitutional:normal, well developed, well nourished, alert, in no distress and non-toxic and no overt pain behavior.  Eyes:anicteric  HEENT:grossly intact  Neck:supple, symmetric, trachea midline and no masses   Pulmonary:even and unlabored  Cardiovascular:No edema or pitting edema present  Skin:Normal without rashes or lesions and well hydrated  Psychiatric:Mood and " affect appropriate  Neurologic: Motor function is grossly intact with no focal neurologic deficits  Musculoskeletal: Allodynia of the plantar > dorsal aspects of left foot. Gait is nonantalgic.    Imaging

## 2023-12-28 ENCOUNTER — TELEPHONE (OUTPATIENT)
Age: 48
End: 2023-12-28

## 2023-12-28 NOTE — TELEPHONE ENCOUNTER
Caller: Roseline w/ [employer]     Doctor/Office:     CB#: n/a      What needs to be faxed: Last ovn     ATTN to: [id number]    Fax#: [phone]       Documents were successfully e-faxed

## 2024-01-10 ENCOUNTER — TELEPHONE (OUTPATIENT)
Age: 49
End: 2024-01-10

## 2024-01-10 NOTE — TELEPHONE ENCOUNTER
Caller: Aliyah ([facility])    Doctor: Sari    Reason for call: Aliyah will be faxing over a report that was done on the pt on 1/5/24     Call back#: [phone]

## 2024-01-30 ENCOUNTER — OFFICE VISIT (OUTPATIENT)
Dept: FAMILY MEDICINE CLINIC | Facility: CLINIC | Age: 49
End: 2024-01-30

## 2024-01-30 ENCOUNTER — APPOINTMENT (OUTPATIENT)
Dept: LAB | Facility: CLINIC | Age: 49
End: 2024-01-30
Payer: COMMERCIAL

## 2024-01-30 VITALS
DIASTOLIC BLOOD PRESSURE: 80 MMHG | SYSTOLIC BLOOD PRESSURE: 138 MMHG | BODY MASS INDEX: 33.13 KG/M2 | HEART RATE: 78 BPM | TEMPERATURE: 96.5 F | RESPIRATION RATE: 18 BRPM | WEIGHT: 180 LBS | HEIGHT: 62 IN | OXYGEN SATURATION: 99 %

## 2024-01-30 DIAGNOSIS — F32.A ANXIETY AND DEPRESSION: ICD-10-CM

## 2024-01-30 DIAGNOSIS — M79.2 NEUROPATHIC PAIN: ICD-10-CM

## 2024-01-30 DIAGNOSIS — R79.89 LOW VITAMIN B12 LEVEL: ICD-10-CM

## 2024-01-30 DIAGNOSIS — Z12.11 COLON CANCER SCREENING: ICD-10-CM

## 2024-01-30 DIAGNOSIS — Z00.00 ANNUAL PHYSICAL EXAM: ICD-10-CM

## 2024-01-30 DIAGNOSIS — E66.9 OBESITY (BMI 30-39.9): ICD-10-CM

## 2024-01-30 DIAGNOSIS — R30.0 DYSURIA: Primary | ICD-10-CM

## 2024-01-30 DIAGNOSIS — J45.41 MODERATE PERSISTENT ASTHMA WITH ACUTE EXACERBATION: ICD-10-CM

## 2024-01-30 DIAGNOSIS — Z12.31 BREAST CANCER SCREENING BY MAMMOGRAM: ICD-10-CM

## 2024-01-30 DIAGNOSIS — S97.82XD CRUSHING INJURY OF LEFT FOOT, SUBSEQUENT ENCOUNTER: ICD-10-CM

## 2024-01-30 DIAGNOSIS — Z72.0 TOBACCO USE: ICD-10-CM

## 2024-01-30 DIAGNOSIS — R79.89 ELEVATED LFTS: ICD-10-CM

## 2024-01-30 DIAGNOSIS — M79.672 LEFT FOOT PAIN: ICD-10-CM

## 2024-01-30 DIAGNOSIS — F41.9 ANXIETY AND DEPRESSION: ICD-10-CM

## 2024-01-30 DIAGNOSIS — Z12.4 CERVICAL CANCER SCREENING: ICD-10-CM

## 2024-01-30 LAB
ALBUMIN SERPL BCP-MCNC: 4.2 G/DL (ref 3.5–5)
ALP SERPL-CCNC: 82 U/L (ref 34–104)
ALT SERPL W P-5'-P-CCNC: 17 U/L (ref 7–52)
ANION GAP SERPL CALCULATED.3IONS-SCNC: 10 MMOL/L
AST SERPL W P-5'-P-CCNC: 19 U/L (ref 13–39)
BASOPHILS # BLD AUTO: 0.02 THOUSANDS/ÂΜL (ref 0–0.1)
BASOPHILS NFR BLD AUTO: 0 % (ref 0–1)
BILIRUB SERPL-MCNC: 0.34 MG/DL (ref 0.2–1)
BUN SERPL-MCNC: 8 MG/DL (ref 5–25)
CALCIUM SERPL-MCNC: 9.4 MG/DL (ref 8.4–10.2)
CHLORIDE SERPL-SCNC: 105 MMOL/L (ref 96–108)
CHOLEST SERPL-MCNC: 229 MG/DL
CO2 SERPL-SCNC: 27 MMOL/L (ref 21–32)
CREAT SERPL-MCNC: 0.85 MG/DL (ref 0.6–1.3)
EOSINOPHIL # BLD AUTO: 0.17 THOUSAND/ÂΜL (ref 0–0.61)
EOSINOPHIL NFR BLD AUTO: 2 % (ref 0–6)
ERYTHROCYTE [DISTWIDTH] IN BLOOD BY AUTOMATED COUNT: 13 % (ref 11.6–15.1)
EST. AVERAGE GLUCOSE BLD GHB EST-MCNC: 123 MG/DL
GFR SERPL CREATININE-BSD FRML MDRD: 81 ML/MIN/1.73SQ M
GLUCOSE P FAST SERPL-MCNC: 90 MG/DL (ref 65–99)
HBA1C MFR BLD: 5.9 %
HCT VFR BLD AUTO: 44.7 % (ref 34.8–46.1)
HDLC SERPL-MCNC: 45 MG/DL
HGB BLD-MCNC: 14.8 G/DL (ref 11.5–15.4)
IMM GRANULOCYTES # BLD AUTO: 0.03 THOUSAND/UL (ref 0–0.2)
IMM GRANULOCYTES NFR BLD AUTO: 0 % (ref 0–2)
LDLC SERPL CALC-MCNC: 145 MG/DL (ref 0–100)
LYMPHOCYTES # BLD AUTO: 3.51 THOUSANDS/ÂΜL (ref 0.6–4.47)
LYMPHOCYTES NFR BLD AUTO: 41 % (ref 14–44)
MCH RBC QN AUTO: 31.7 PG (ref 26.8–34.3)
MCHC RBC AUTO-ENTMCNC: 33.1 G/DL (ref 31.4–37.4)
MCV RBC AUTO: 96 FL (ref 82–98)
MONOCYTES # BLD AUTO: 0.65 THOUSAND/ÂΜL (ref 0.17–1.22)
MONOCYTES NFR BLD AUTO: 8 % (ref 4–12)
NEUTROPHILS # BLD AUTO: 4.12 THOUSANDS/ÂΜL (ref 1.85–7.62)
NEUTS SEG NFR BLD AUTO: 49 % (ref 43–75)
NONHDLC SERPL-MCNC: 184 MG/DL
NRBC BLD AUTO-RTO: 0 /100 WBCS
PLATELET # BLD AUTO: 355 THOUSANDS/UL (ref 149–390)
PMV BLD AUTO: 10 FL (ref 8.9–12.7)
POTASSIUM SERPL-SCNC: 3.1 MMOL/L (ref 3.5–5.3)
PROT SERPL-MCNC: 7.9 G/DL (ref 6.4–8.4)
RBC # BLD AUTO: 4.67 MILLION/UL (ref 3.81–5.12)
SL AMB  POCT GLUCOSE, UA: NORMAL
SL AMB LEUKOCYTE ESTERASE,UA: NORMAL
SL AMB POCT BILIRUBIN,UA: NORMAL
SL AMB POCT BLOOD,UA: NORMAL
SL AMB POCT COLOR,UA: YELLOW
SL AMB POCT KETONES,UA: NORMAL
SL AMB POCT NITRITE,UA: NORMAL
SL AMB POCT PH,UA: 5
SL AMB POCT SPECIFIC GRAVITY,UA: 1.01
SL AMB POCT URINE PROTEIN: NORMAL
SL AMB POCT UROBILINOGEN: NORMAL
SODIUM SERPL-SCNC: 142 MMOL/L (ref 135–147)
TRIGL SERPL-MCNC: 195 MG/DL
TSH SERPL DL<=0.05 MIU/L-ACNC: 2.41 UIU/ML (ref 0.45–4.5)
VIT B12 SERPL-MCNC: 314 PG/ML (ref 180–914)
WBC # BLD AUTO: 8.5 THOUSAND/UL (ref 4.31–10.16)

## 2024-01-30 PROCEDURE — 80053 COMPREHEN METABOLIC PANEL: CPT

## 2024-01-30 PROCEDURE — 84443 ASSAY THYROID STIM HORMONE: CPT

## 2024-01-30 PROCEDURE — 81002 URINALYSIS NONAUTO W/O SCOPE: CPT | Performed by: NURSE PRACTITIONER

## 2024-01-30 PROCEDURE — 36415 COLL VENOUS BLD VENIPUNCTURE: CPT

## 2024-01-30 PROCEDURE — 99214 OFFICE O/P EST MOD 30 MIN: CPT | Performed by: NURSE PRACTITIONER

## 2024-01-30 PROCEDURE — 85025 COMPLETE CBC W/AUTO DIFF WBC: CPT

## 2024-01-30 PROCEDURE — 99396 PREV VISIT EST AGE 40-64: CPT | Performed by: NURSE PRACTITIONER

## 2024-01-30 PROCEDURE — 80061 LIPID PANEL: CPT

## 2024-01-30 PROCEDURE — 82607 VITAMIN B-12: CPT

## 2024-01-30 PROCEDURE — 83036 HEMOGLOBIN GLYCOSYLATED A1C: CPT

## 2024-01-30 RX ORDER — ALBUTEROL SULFATE 90 UG/1
2 AEROSOL, METERED RESPIRATORY (INHALATION) EVERY 6 HOURS PRN
Qty: 18 G | Refills: 0 | Status: SHIPPED | OUTPATIENT
Start: 2024-01-30

## 2024-01-30 RX ORDER — NAPROXEN 500 MG/1
500 TABLET ORAL 2 TIMES DAILY WITH MEALS
Qty: 60 TABLET | Refills: 0 | Status: SHIPPED | OUTPATIENT
Start: 2024-01-30

## 2024-01-30 RX ORDER — FLUTICASONE PROPIONATE AND SALMETEROL 100; 50 UG/1; UG/1
1 POWDER RESPIRATORY (INHALATION) 2 TIMES DAILY
Qty: 60 BLISTER | Refills: 0 | Status: SHIPPED | OUTPATIENT
Start: 2024-01-30 | End: 2024-07-28

## 2024-01-30 RX ORDER — FLUTICASONE PROPIONATE AND SALMETEROL 100; 50 UG/1; UG/1
1 POWDER RESPIRATORY (INHALATION) 2 TIMES DAILY
Qty: 60 BLISTER | Refills: 0 | Status: SHIPPED | OUTPATIENT
Start: 2024-01-30 | End: 2024-01-30 | Stop reason: SDUPTHER

## 2024-01-30 RX ORDER — ALBUTEROL SULFATE 90 UG/1
2 AEROSOL, METERED RESPIRATORY (INHALATION) EVERY 6 HOURS PRN
Qty: 18 G | Refills: 0 | Status: SHIPPED | OUTPATIENT
Start: 2024-01-30 | End: 2024-01-30 | Stop reason: SDUPTHER

## 2024-01-30 RX ORDER — AZITHROMYCIN 250 MG/1
TABLET, FILM COATED ORAL DAILY
Qty: 6 TABLET | Refills: 0 | Status: SHIPPED | OUTPATIENT
Start: 2024-01-30 | End: 2024-02-04

## 2024-01-30 RX ORDER — IPRATROPIUM BROMIDE AND ALBUTEROL SULFATE 2.5; .5 MG/3ML; MG/3ML
3 SOLUTION RESPIRATORY (INHALATION) ONCE
Status: COMPLETED | OUTPATIENT
Start: 2024-01-30 | End: 2024-01-30

## 2024-01-30 RX ORDER — ESCITALOPRAM OXALATE 10 MG/1
10 TABLET ORAL DAILY
Qty: 30 TABLET | Refills: 0 | Status: SHIPPED | OUTPATIENT
Start: 2024-01-30

## 2024-01-30 RX ORDER — HYDROXYZINE HYDROCHLORIDE 25 MG/1
25 TABLET, FILM COATED ORAL 3 TIMES DAILY PRN
Qty: 40 TABLET | Refills: 0 | Status: SHIPPED | OUTPATIENT
Start: 2024-01-30

## 2024-01-30 RX ORDER — KETOROLAC TROMETHAMINE 30 MG/ML
30 INJECTION, SOLUTION INTRAMUSCULAR; INTRAVENOUS ONCE
Status: COMPLETED | OUTPATIENT
Start: 2024-01-30 | End: 2024-01-30

## 2024-01-30 RX ORDER — PREDNISONE 20 MG/1
40 TABLET ORAL DAILY
Qty: 10 TABLET | Refills: 0 | Status: SHIPPED | OUTPATIENT
Start: 2024-01-30 | End: 2024-02-04

## 2024-01-30 RX ADMIN — IPRATROPIUM BROMIDE AND ALBUTEROL SULFATE 3 ML: 2.5; .5 SOLUTION RESPIRATORY (INHALATION) at 14:03

## 2024-01-30 RX ADMIN — KETOROLAC TROMETHAMINE 30 MG: 30 INJECTION, SOLUTION INTRAMUSCULAR; INTRAVENOUS at 14:03

## 2024-01-30 NOTE — ASSESSMENT & PLAN NOTE
PHQ-2/9 Depression Screening    Little interest or pleasure in doing things: 1 - several days  Feeling down, depressed, or hopeless: 1 - several days  Trouble falling or staying asleep, or sleeping too much: 2 - more than half the days  Feeling tired or having little energy: 3 - nearly every day  Poor appetite or overeatin - several days  Feeling bad about yourself - or that you are a failure or have let yourself or your family down: 0 - not at all  Trouble concentrating on things, such as reading the newspaper or watching television: 1 - several days  Moving or speaking so slowly that other people could have noticed. Or the opposite - being so fidgety or restless that you have been moving around a lot more than usual: 0 - not at all  Thoughts that you would be better off dead, or of hurting yourself in some way: 0 - not at all  PHQ-9 Score: 9  PHQ-9 Interpretation: Mild depression       Declines SI, declines psych referral

## 2024-01-30 NOTE — ASSESSMENT & PLAN NOTE
Not controlled, exacerbation w/ wheezing on exam   Neb treatment given in office with improvement in sx   Will send short prednisone course and z-pack  Check CXR   Continue with Advair and albuterol pump PRN

## 2024-01-30 NOTE — ASSESSMENT & PLAN NOTE
Workplace injury 8/2023   Following with podiatry, patient requests second opinion given lack of improvement   Referral placed

## 2024-01-30 NOTE — PROGRESS NOTES
ADULT ANNUAL PHYSICAL  [facility]    NAME: Shana Ceja  AGE: 48 y.o. SEX: female  : 1975     DATE: 2024     Assessment and Plan:     Problem List Items Addressed This Visit          Respiratory    Asthma     Not controlled, exacerbation w/ wheezing on exam   Neb treatment given in office with improvement in sx   Will send short prednisone course and z-pack  Check CXR   Continue with Advair and albuterol pump PRN         Relevant Medications    albuterol (Ventolin HFA) 90 mcg/act inhaler    Fluticasone-Salmeterol (Advair Diskus) 100-50 mcg/dose inhaler    predniSONE 20 mg tablet    azithromycin (Zithromax) 250 mg tablet    ipratropium-albuterol (DUO-NEB) 0.5-2.5 mg/3 mL inhalation solution 3 mL (Completed)    Other Relevant Orders    XR chest pa & lateral       Other    Elevated LFTs    Relevant Orders    Comprehensive metabolic panel    Lipid panel    Tobacco use     -continue to encourage cessation  -patient is aware of pharmacotherapy aids to assist with cessation            Anxiety and depression     PHQ-2/9 Depression Screening    Little interest or pleasure in doing things: 1 - several days  Feeling down, depressed, or hopeless: 1 - several days  Trouble falling or staying asleep, or sleeping too much: 2 - more than half the days  Feeling tired or having little energy: 3 - nearly every day  Poor appetite or overeatin - several days  Feeling bad about yourself - or that you are a failure or have let yourself or your family down: 0 - not at all  Trouble concentrating on things, such as reading the newspaper or watching television: 1 - several days  Moving or speaking so slowly that other people could have noticed. Or the opposite - being so fidgety or restless that you have been moving around a lot more than usual: 0 - not at all  Thoughts that you would be better off dead, or of hurting yourself in some way: 0 - not at  all  PHQ-9 Score: 9  PHQ-9 Interpretation: Mild depression       Declines SI, declines psych referral          Relevant Medications    escitalopram (LEXAPRO) 10 mg tablet    hydrOXYzine HCL (ATARAX) 25 mg tablet    Crushing injury of left foot     Workplace injury 8/2023   Following with podiatry, patient requests second opinion given lack of improvement   Referral placed           Other Visit Diagnoses       Dysuria    -  Primary    Relevant Orders    POCT urine dip (Completed)    Neuropathic pain        Left foot pain        Relevant Medications    naproxen (Naprosyn) 500 mg tablet    ketorolac (TORADOL) injection 30 mg (Completed)    Other Relevant Orders    Ambulatory Referral to Podiatry    Colon cancer screening        Relevant Orders    Cologuard    Breast cancer screening by mammogram        Relevant Orders    Mammo screening bilateral w 3d & cad    Cervical cancer screening        Relevant Orders    Ambulatory Referral to Obstetrics / Gynecology    Obesity (BMI 30-39.9)        Relevant Orders    CBC and differential    Comprehensive metabolic panel    Hemoglobin A1C    Lipid panel    TSH, 3rd generation with Free T4 reflex    Low vitamin B12 level        Relevant Orders    Vitamin B12    Annual physical exam                Immunizations and preventive care screenings were discussed with patient today. Appropriate education was printed on patient's after visit summary.    Counseling:  Preventative screenings  Immunizations  Smoking cessation       BMI Counseling: Body mass index is 32.92 kg/m². The BMI is above normal. Nutrition recommendations include decreasing portion sizes, encouraging healthy choices of fruits and vegetables, decreasing fast food intake, consuming healthier snacks, limiting drinks that contain sugar, moderation in carbohydrate intake, increasing intake of lean protein and reducing intake of cholesterol. Exercise recommendations include moderate physical activity 150 minutes/week.  Rationale for BMI follow-up plan is due to patient being overweight or obese.     Depression Screening and Follow-up Plan: Patient's depression screening was positive with a PHQ-9 score of 9. Patient assessed for underlying major depression. Brief counseling provided and recommend additional follow-up/re-evaluation next office visit. Patient declines psych referral     Tobacco Cessation Counseling: Tobacco cessation counseling was provided. The patient is sincerely urged to quit consumption of tobacco. She is not ready to quit tobacco.         No follow-ups on file.     Chief Complaint:     Chief Complaint   Patient presents with    Follow-up      History of Present Illness:     Adult Annual Physical   Patient is a 47 YO female with past medical hx of asthma, obesity, depression who presents today for annual physical and follow up.     Patient reports that she has had cough, congestion, wheezing, and shortness of breath for 2 weeks. She is requesting a nebulizer.     She would like a referral to her podiatry for a second opinion regarding a workplace injury. Sustained left foot crush injury  8/2023 and is still having constant pain.    The following portions of the patient's history were reviewed and updated as appropriate: allergies, current medications, past family history, past medical history, past social history, past surgical history and problem list.    Diet and Physical Activity  Diet/Nutrition: well balanced diet, low carb diet, consuming 3-5 servings of fruits/vegetables daily, and adequate fiber intake.   Exercise: no formal exercise and Patient currently does modified exercises due to her foot injury  .      Depression Screening  PHQ-2/9 Depression Screening    Little interest or pleasure in doing things: 1 - several days  Feeling down, depressed, or hopeless: 1 - several days  Trouble falling or staying asleep, or sleeping too much: 2 - more than half the days  Feeling tired or having little energy: 3 -  nearly every day  Poor appetite or overeatin - several days  Feeling bad about yourself - or that you are a failure or have let yourself or your family down: 0 - not at all  Trouble concentrating on things, such as reading the newspaper or watching television: 1 - several days  Moving or speaking so slowly that other people could have noticed. Or the opposite - being so fidgety or restless that you have been moving around a lot more than usual: 0 - not at all  Thoughts that you would be better off dead, or of hurting yourself in some way: 0 - not at all  PHQ-9 Score: 9  PHQ-9 Interpretation: Mild depression       General Health  Sleep: sleeps poorly, gets 4-6 hours of sleep on average, and patient wakes up about 5-6 times due to pain on her foot .   Hearing: normal - bilateral.  Vision: Due for vision screening   Dental -Saw a dentist last year for teeth extraction. Brushes teeth twice a day and flosses regularly..       /GYN Health  Follows with gynecology? no   Patient is: perimenopausal  Last menstrual period: less than one year  Contraceptive method: Patient in a monogamous relationship with ..    Advanced Care Planning  Do you have an advanced directive? no  Do you have a durable medical power of ? Patient's daughter would make decisions incase she was incapacitated . Abeba Elaine.     Review of Systems:     Review of Systems   Constitutional:  Negative for chills and fever.   HENT:  Positive for postnasal drip. Negative for ear pain, nosebleeds, rhinorrhea, sinus pressure, sinus pain and sore throat.    Eyes:  Negative for pain and visual disturbance.   Respiratory:  Positive for cough, chest tightness, shortness of breath and wheezing.    Cardiovascular:  Negative for chest pain and palpitations.   Gastrointestinal:  Negative for abdominal pain, constipation and vomiting.   Endocrine: Positive for heat intolerance.   Genitourinary:  Positive for dysuria. Negative for difficulty urinating,  dyspareunia and hematuria.   Musculoskeletal:  Positive for arthralgias and myalgias. Negative for back pain.   Skin:  Negative for color change, pallor and rash.   Neurological:  Negative for dizziness, seizures, syncope, weakness and headaches.   Psychiatric/Behavioral:  Negative for self-injury and sleep disturbance. The patient is not nervous/anxious and is not hyperactive.    All other systems reviewed and are negative.     Past Medical History:     Past Medical History:   Diagnosis Date    Asthma     Gallstone     Migraine       Past Surgical History:     Past Surgical History:   Procedure Laterality Date    CHOLECYSTECTOMY  2016      Social History:     Social History     Socioeconomic History    Marital status: Single     Spouse name: None    Number of children: None    Years of education: None    Highest education level: None   Occupational History    None   Tobacco Use    Smoking status: Every Day     Current packs/day: 0.50     Types: Cigarettes    Smokeless tobacco: Current   Vaping Use    Vaping status: Never Used   Substance and Sexual Activity    Alcohol use: Not Currently    Drug use: Not Currently     Types: Marijuana    Sexual activity: Not Currently   Other Topics Concern    None   Social History Narrative    None     Social Determinants of Health     Financial Resource Strain: Medium Risk (2/10/2023)    Overall Financial Resource Strain (CARDIA)     Difficulty of Paying Living Expenses: Somewhat hard   Food Insecurity: No Food Insecurity (2/10/2023)    Hunger Vital Sign     Worried About Running Out of Food in the Last Year: Never true     Ran Out of Food in the Last Year: Never true   Transportation Needs: No Transportation Needs (2/10/2023)    PRAPARE - Transportation     Lack of Transportation (Medical): No     Lack of Transportation (Non-Medical): No   Physical Activity: Not on file   Stress: Not on file   Social Connections: Not on file   Intimate Partner Violence: Not on file   Housing  Stability: Not on file      Family History:     Family History   Problem Relation Age of Onset    Asthma Mother     No Known Problems Sister     No Known Problems Maternal Grandmother     No Known Problems Maternal Grandfather     No Known Problems Paternal Grandmother     No Known Problems Paternal Grandfather     No Known Problems Maternal Aunt     No Known Problems Paternal Aunt     Breast cancer Paternal Aunt     No Known Problems Paternal Aunt     No Known Problems Paternal Aunt       Current Medications:     Current Outpatient Medications   Medication Sig Dispense Refill    albuterol (Ventolin HFA) 90 mcg/act inhaler Inhale 2 puffs every 6 (six) hours as needed for wheezing 18 g 0    azithromycin (Zithromax) 250 mg tablet Take 2 tablets (500 mg total) by mouth daily for 1 day, THEN 1 tablet (250 mg total) daily for 4 days. 6 tablet 0    escitalopram (LEXAPRO) 10 mg tablet Take 1 tablet (10 mg total) by mouth daily 30 tablet 0    Fluticasone-Salmeterol (Advair Diskus) 100-50 mcg/dose inhaler Inhale 1 puff 2 (two) times a day Rinse mouth after use. 60 blister 0    hydrOXYzine HCL (ATARAX) 25 mg tablet Take 1 tablet (25 mg total) by mouth 3 (three) times a day as needed for anxiety 40 tablet 0    naproxen (Naprosyn) 500 mg tablet Take 1 tablet (500 mg total) by mouth 2 (two) times a day with meals 60 tablet 0    predniSONE 20 mg tablet Take 2 tablets (40 mg total) by mouth daily for 5 days 10 tablet 0    ipratropium-albuterol (DUO-NEB) 0.5-2.5 mg/3 mL nebulizer solution Take 3 mL by nebulization 4 (four) times a day 400 mL 0    meclizine (ANTIVERT) 25 mg tablet Take 1 tablet (25 mg total) by mouth 3 (three) times a day as needed for dizziness 30 tablet 0    pregabalin (LYRICA) 25 mg capsule Take 1 capsule (25 mg total) by mouth 3 (three) times a day 90 capsule 1     No current facility-administered medications for this visit.      Allergies:     No Known Allergies   Physical Exam:     /80 (BP Location:  "Right arm, Patient Position: Sitting, Cuff Size: Standard)   Pulse 78   Temp (!) 96.5 °F (35.8 °C) (Temporal)   Resp 18   Ht 5' 2\" (1.575 m)   Wt 81.6 kg (180 lb)   SpO2 99%   BMI 32.92 kg/m²     Physical Exam  Vitals and nursing note reviewed.   Constitutional:       General: She is not in acute distress.     Appearance: Normal appearance. She is not diaphoretic.   HENT:      Head: Normocephalic.      Right Ear: External ear normal. Tympanic membrane is erythematous.      Left Ear: External ear normal. Tympanic membrane is erythematous.      Nose: Nose normal.      Mouth/Throat:      Mouth: Mucous membranes are moist.   Eyes:      General:         Right eye: No discharge.         Left eye: No discharge.      Extraocular Movements: Extraocular movements intact.      Conjunctiva/sclera: Conjunctivae normal.      Pupils: Pupils are equal, round, and reactive to light.   Cardiovascular:      Rate and Rhythm: Normal rate and regular rhythm.      Pulses: Normal pulses.      Heart sounds: Normal heart sounds.   Pulmonary:      Effort: Pulmonary effort is normal. No respiratory distress.      Breath sounds: Examination of the right-lower field reveals decreased breath sounds and wheezing. Examination of the left-lower field reveals decreased breath sounds and wheezing. Decreased breath sounds and wheezing present.   Abdominal:      General: Bowel sounds are normal. There is no distension.      Palpations: Abdomen is soft.   Musculoskeletal:         General: Normal range of motion.      Cervical back: Normal range of motion and neck supple. No rigidity.      Right lower leg: No edema.      Left lower leg: No edema.   Lymphadenopathy:      Cervical: No cervical adenopathy.   Skin:     General: Skin is warm and dry.      Capillary Refill: Capillary refill takes less than 2 seconds.      Findings: No rash.   Neurological:      General: No focal deficit present.      Mental Status: She is alert and oriented to person, " place, and time.   Psychiatric:         Mood and Affect: Mood normal.         Behavior: Behavior normal.          EVAN Park  [facility]

## 2024-02-02 LAB
DME PARACHUTE DELIVERY DATE ACTUAL: NORMAL
DME PARACHUTE DELIVERY DATE REQUESTED: NORMAL
DME PARACHUTE ITEM DESCRIPTION: NORMAL
DME PARACHUTE ORDER STATUS: NORMAL
DME PARACHUTE SUPPLIER NAME: NORMAL
DME PARACHUTE SUPPLIER PHONE: NORMAL

## 2024-02-06 ENCOUNTER — TELEPHONE (OUTPATIENT)
Age: 49
End: 2024-02-06

## 2024-02-06 DIAGNOSIS — E78.5 HYPERLIPIDEMIA, UNSPECIFIED HYPERLIPIDEMIA TYPE: ICD-10-CM

## 2024-02-06 DIAGNOSIS — E87.6 HYPOKALEMIA: Primary | ICD-10-CM

## 2024-02-06 DIAGNOSIS — E53.8 B12 DEFICIENCY: ICD-10-CM

## 2024-02-06 RX ORDER — POTASSIUM CHLORIDE 20 MEQ/1
20 TABLET, EXTENDED RELEASE ORAL DAILY
Qty: 14 TABLET | Refills: 0 | Status: SHIPPED | OUTPATIENT
Start: 2024-02-06

## 2024-02-06 RX ORDER — ATORVASTATIN CALCIUM 20 MG/1
20 TABLET, FILM COATED ORAL DAILY
Qty: 90 TABLET | Refills: 1 | Status: SHIPPED | OUTPATIENT
Start: 2024-02-06

## 2024-02-06 NOTE — TELEPHONE ENCOUNTER
Left RN detailed VM informing her that she can accompany the pt to the appt, changed pt's appt to 30 minutes to allow time for questions after visit from RN.

## 2024-02-06 NOTE — TELEPHONE ENCOUNTER
Caller: lin    Doctor: breanna    Reason for call: [employer] insurance is having a rn come along. They need to know if there will be a charge to talk to the rn and does she need a sperate appt?    Call back#: [phone]

## 2024-02-26 DIAGNOSIS — F32.A ANXIETY AND DEPRESSION: ICD-10-CM

## 2024-02-26 DIAGNOSIS — M79.672 LEFT FOOT PAIN: ICD-10-CM

## 2024-02-26 DIAGNOSIS — F41.9 ANXIETY AND DEPRESSION: ICD-10-CM

## 2024-02-26 RX ORDER — ESCITALOPRAM OXALATE 10 MG/1
10 TABLET ORAL DAILY
Qty: 30 TABLET | Refills: 0 | Status: SHIPPED | OUTPATIENT
Start: 2024-02-26

## 2024-02-26 RX ORDER — NAPROXEN 500 MG/1
500 TABLET ORAL 2 TIMES DAILY WITH MEALS
Qty: 60 TABLET | Refills: 0 | Status: SHIPPED | OUTPATIENT
Start: 2024-02-26

## 2024-02-27 ENCOUNTER — OFFICE VISIT (OUTPATIENT)
Dept: PAIN MEDICINE | Facility: CLINIC | Age: 49
End: 2024-02-27
Payer: COMMERCIAL

## 2024-02-27 VITALS
WEIGHT: 180 LBS | SYSTOLIC BLOOD PRESSURE: 128 MMHG | BODY MASS INDEX: 33.13 KG/M2 | DIASTOLIC BLOOD PRESSURE: 86 MMHG | HEIGHT: 62 IN

## 2024-02-27 DIAGNOSIS — G90.522 COMPLEX REGIONAL PAIN SYNDROME TYPE 1 OF LEFT LOWER EXTREMITY: ICD-10-CM

## 2024-02-27 DIAGNOSIS — M79.672 LEFT FOOT PAIN: Primary | ICD-10-CM

## 2024-02-27 PROCEDURE — 99214 OFFICE O/P EST MOD 30 MIN: CPT | Performed by: ANESTHESIOLOGY

## 2024-02-27 NOTE — PROGRESS NOTES
Assessment:  1. Left foot pain    2. Complex regional pain syndrome type 1 of left lower extremity      Plan:    Patient presenting for follow up in regards to chronic left foot pain that has persisted after crush injury at work 8/16/23. She previously had exhausted PT/OT. Thought she is able to walk without assistance, she has intermittent episodes of severe pain that is limiting her ADLs. She recently completed a FCE.     Pain is consistent with CRPS I, neuropathic pain and accompanied by pain >7/10 on the pain scale with inability to participate in IADLs for >6 weeks. Patient has fully participated with physical therapy that is still recommended to continue at this point.     Patient does fulfill the diagnosis for CRPS based on the [place] criteria as follows:     The patient has continued pain which is disproportionate to any inciting event _X_  The patient has at least one sign in 2 or more of the following categories _X_  The patient reports at least 1 symptom and 3 of more categories _X_  No other diagnosis can better explain the signs and symptoms _X_     Sensory: Allodynia to light touch and/or temperature sensation and/or deep somatic pressure and/or hyperalgesia to pinprick      Vasomotor: Temperature asymmetry and/or skin color changes and/or skin color asymmetry     Sudomotor: Edema and/or sweating changes and or sweating asymmetry     Motor/trophic: Decreased ROM and/or motor dysfunction (weakness, tremor, dystonia) and/or trophic changes (hair/nail/skin)     Previously failed gabapentin/Lyrica trials.     Recommend she continue lidocaine topical cream to affected areas, NSAIDs)    At this time, I provided the patient with information on spinal cord stimulator trial ([employer]). She will review the provided literature and contact our office if she wants to proceed with SCS trial process.    Reviewed external notes from PCP (1/30/24), Podiatry (11/9/23) offices in regards to current and prior  treatments tried.     Reviewed pertinent laboratory studies, specifically renal function, hemoglobin A1c, CBC, coagulation studies, prior to recommending medication therapies/interventional treatment options.     [place] Prescription Drug Monitoring Program report was reviewed and was appropriate     My impressions and treatment recommendations were discussed in detail with the patient who verbalized understanding and had no further questions.  Discharge instructions were provided. I personally saw and examined the patient and I agree with the above discussed plan of care.    Orders Placed This Encounter   Procedures    Ambulatory referral to Physical Therapy     Standing Status:   Future     Standing Expiration Date:   2/27/2025     Referral Priority:   Routine     Referral Type:   Physical Therapy     Referral Reason:   Specialty Services Required     Requested Specialty:   Physical Therapy     Number of Visits Requested:   1     Expiration Date:   2/27/2025     No orders of the defined types were placed in this encounter.      History of Present Illness:  Shana Ceja is a 49 y.o. female who presents for a follow up office visit in regards to left foot pain. The patient’s current symptoms include continued left foot neuropathic pain symptoms with pain rated 8/10. Pain is described as an intermittent burning, aching, sharp, cramping, pressure-like pain with pins/needles.    I have personally reviewed and/or updated the patient's past medical history, past surgical history, family history, social history, current medications, allergies, and vital signs today.     Review of Systems   Constitutional:  Negative for chills and fever.   HENT:  Negative for ear pain and sore throat.    Eyes:  Negative for pain and visual disturbance.   Respiratory:  Negative for cough and shortness of breath.    Cardiovascular:  Negative for chest pain and palpitations.   Gastrointestinal:  Negative for abdominal pain and vomiting.    Genitourinary:  Negative for dysuria and hematuria.   Musculoskeletal:  Positive for back pain, gait problem and myalgias. Negative for arthralgias.   Skin:  Negative for color change and rash.   Neurological:  Positive for weakness and numbness. Negative for seizures and syncope.   All other systems reviewed and are negative.      Patient Active Problem List   Diagnosis    Abdominal pain    Elevated LFTs    Cholelithiasis    Choledocholithiasis    Tobacco use    Asthma    Anxiety and depression    Dizziness    Numbness and tingling in both hands    Acute bilateral low back pain without sciatica    Missed period    Crushing injury of left foot    Contusion of left ankle    Contusion of left foot       Past Medical History:   Diagnosis Date    Asthma     Gallstone     Migraine        Past Surgical History:   Procedure Laterality Date    CHOLECYSTECTOMY  2016       Family History   Problem Relation Age of Onset    Asthma Mother     No Known Problems Sister     No Known Problems Maternal Grandmother     No Known Problems Maternal Grandfather     No Known Problems Paternal Grandmother     No Known Problems Paternal Grandfather     No Known Problems Maternal Aunt     No Known Problems Paternal Aunt     Breast cancer Paternal Aunt     No Known Problems Paternal Aunt     No Known Problems Paternal Aunt        Social History     Occupational History    Not on file   Tobacco Use    Smoking status: Every Day     Current packs/day: 0.50     Types: Cigarettes    Smokeless tobacco: Current   Vaping Use    Vaping status: Never Used   Substance and Sexual Activity    Alcohol use: Not Currently    Drug use: Not Currently     Types: Marijuana    Sexual activity: Not Currently       Current Outpatient Medications on File Prior to Visit   Medication Sig    albuterol (Ventolin HFA) 90 mcg/act inhaler Inhale 2 puffs every 6 (six) hours as needed for wheezing    atorvastatin (LIPITOR) 20 mg tablet Take 1 tablet (20 mg total) by mouth  "daily    cyanocobalamin (VITAMIN B-12) 1000 MCG tablet Take 1 tablet (1,000 mcg total) by mouth daily    escitalopram (LEXAPRO) 10 mg tablet TAKE 1 TABLET BY MOUTH EVERY DAY    Fluticasone-Salmeterol (Advair Diskus) 100-50 mcg/dose inhaler Inhale 1 puff 2 (two) times a day Rinse mouth after use.    hydrOXYzine HCL (ATARAX) 25 mg tablet Take 1 tablet (25 mg total) by mouth 3 (three) times a day as needed for anxiety    ipratropium-albuterol (DUO-NEB) 0.5-2.5 mg/3 mL nebulizer solution Take 3 mL by nebulization 4 (four) times a day    meclizine (ANTIVERT) 25 mg tablet Take 1 tablet (25 mg total) by mouth 3 (three) times a day as needed for dizziness    naproxen (NAPROSYN) 500 mg tablet TAKE 1 TABLET BY MOUTH TWICE A DAY WITH MEALS    potassium chloride (Klor-Con M20) 20 mEq tablet Take 1 tablet (20 mEq total) by mouth daily    pregabalin (LYRICA) 25 mg capsule Take 1 capsule (25 mg total) by mouth 3 (three) times a day     No current facility-administered medications on file prior to visit.       No Known Allergies    Physical Exam:    /86   Ht 5' 2\" (1.575 m)   Wt 81.6 kg (180 lb)   BMI 32.92 kg/m²     Constitutional:normal, well developed, well nourished, alert, in no distress and non-toxic and no overt pain behavior.  Eyes:anicteric  HEENT:grossly intact  Neck:supple, symmetric, trachea midline and no masses   Pulmonary:even and unlabored  Cardiovascular:No edema or pitting edema present  Skin:Normal without rashes or lesions and well hydrated  Psychiatric:Mood and affect appropriate  Neurologic: Motor function is grossly intact with no focal neurologic deficits   Musculoskeletal: Limited right foot ROM with allodynia.    Imaging    "

## 2024-03-05 ENCOUNTER — OFFICE VISIT (OUTPATIENT)
Dept: PODIATRY | Facility: CLINIC | Age: 49
End: 2024-03-05
Payer: OTHER MISCELLANEOUS

## 2024-03-05 VITALS — RESPIRATION RATE: 18 BRPM | BODY MASS INDEX: 32.92 KG/M2 | HEIGHT: 62 IN

## 2024-03-05 DIAGNOSIS — M24.875 OTHER SPECIFIC JOINT DERANGEMENTS LEFT FOOT, NOT ELSEWHERE CLASSIFIED: ICD-10-CM

## 2024-03-05 DIAGNOSIS — G90.522 COMPLEX REGIONAL PAIN SYNDROME TYPE 1 OF LEFT LOWER EXTREMITY: Primary | ICD-10-CM

## 2024-03-05 DIAGNOSIS — M79.672 LEFT FOOT PAIN: ICD-10-CM

## 2024-03-05 PROCEDURE — 99214 OFFICE O/P EST MOD 30 MIN: CPT | Performed by: PODIATRIST

## 2024-03-05 PROCEDURE — 20600 DRAIN/INJ JOINT/BURSA W/O US: CPT | Performed by: PODIATRIST

## 2024-03-05 RX ORDER — LIDOCAINE HYDROCHLORIDE 10 MG/ML
2 INJECTION, SOLUTION INFILTRATION; PERINEURAL
Status: SHIPPED | OUTPATIENT
Start: 2024-03-05

## 2024-03-05 RX ORDER — TRIAMCINOLONE ACETONIDE 40 MG/ML
20 INJECTION, SUSPENSION INTRA-ARTICULAR; INTRAMUSCULAR
Status: SHIPPED | OUTPATIENT
Start: 2024-03-05

## 2024-03-05 RX ADMIN — LIDOCAINE HYDROCHLORIDE 2 ML: 10 INJECTION, SOLUTION INFILTRATION; PERINEURAL at 10:45

## 2024-03-05 RX ADMIN — TRIAMCINOLONE ACETONIDE 20 MG: 40 INJECTION, SUSPENSION INTRA-ARTICULAR; INTRAMUSCULAR at 10:45

## 2024-03-05 NOTE — PROGRESS NOTES
"               PATIENT:  Shana Ceja  1975       ASSESSMENT:     1. Complex regional pain syndrome type 1 of left lower extremity        2. Other specific joint derangements left foot, not elsewhere classified  Small joint arthrocentesis: L subtalar      3. Left foot pain  Ambulatory Referral to Podiatry                PLAN:  1. Reviewed medical records.  Reviewed notes from podiatry and pain management.  Reviewed images.  Patient was counseled and educated on the condition and the diagnosis.    2. The diagnosis, treatment options and prognosis were discussed with the patient.    3. She has symptoms of CRPS.  She also has focal pain in left STJ with history of STJ injury and fracture at anterior lateral  calcaneus.    4. Will try STJ injection.  See procedure.  Instructed resting and icing.    5. Recommended her to continue PT and follow-up with pain management for possible spinal stimulator.    6. Patient will return in 6 weeks for re-evaluation.       Imaging: I have personally reviewed pertinent films in PACS  Labs, pathology, and Other Studies: I have personally reviewed pertinent reports.      Small joint arthrocentesis: L subtalar  Universal Protocol:  Consent: Verbal consent obtained.  Risks and benefits: risks, benefits and alternatives were discussed  Consent given by: patient  Time out: Immediately prior to procedure a \"time out\" was called to verify the correct patient, procedure, equipment, support staff and site/side marked as required.  Timeout called at: 3/5/2024 11:10 AM.  Patient understanding: patient states understanding of the procedure being performed  Site marked: the operative site was marked  Patient identity confirmed: verbally with patient  Supporting Documentation  Indications: pain   Procedure Details  Location: foot - L subtalar  Preparation: Patient was prepped and draped in the usual sterile fashion  Needle size: 25 G  Ultrasound guidance: no  Approach: lateral  Medications " administered: 20 mg triamcinolone acetonide 40 mg/mL; 2 mL lidocaine 1 %    Patient tolerance: patient tolerated the procedure well with no immediate complications            Subjective:       HPI  The patient presents with chief complaint of left foot pain.  She has severe pain and sensitivity to left foot after work injury last Summer.  She has seen podiatry and pain management.  She is doing PT.  Spinal stimulator was in discussion.  She has difficulty walking due to the pain.  She also has decreased ROM and numbness to left foot.  No significant improvement with treatments at this time.      The following portions of the patient's history were reviewed and updated as appropriate: allergies, current medications, past family history, past medical history, past social history, past surgical history and problem list.  All pertinent labs and images were reviewed.      Past Medical History  Past Medical History:   Diagnosis Date    Asthma     Gallstone     Migraine        Past Surgical History  Past Surgical History:   Procedure Laterality Date    CHOLECYSTECTOMY  2016        Allergies:  Patient has no known allergies.    Medications:  Current Outpatient Medications   Medication Sig Dispense Refill    albuterol (Ventolin HFA) 90 mcg/act inhaler Inhale 2 puffs every 6 (six) hours as needed for wheezing 18 g 0    atorvastatin (LIPITOR) 20 mg tablet Take 1 tablet (20 mg total) by mouth daily 90 tablet 1    cyanocobalamin (VITAMIN B-12) 1000 MCG tablet Take 1 tablet (1,000 mcg total) by mouth daily 90 tablet 3    escitalopram (LEXAPRO) 10 mg tablet TAKE 1 TABLET BY MOUTH EVERY DAY 30 tablet 0    Fluticasone-Salmeterol (Advair Diskus) 100-50 mcg/dose inhaler Inhale 1 puff 2 (two) times a day Rinse mouth after use. 60 blister 0    hydrOXYzine HCL (ATARAX) 25 mg tablet Take 1 tablet (25 mg total) by mouth 3 (three) times a day as needed for anxiety 40 tablet 0    ipratropium-albuterol (DUO-NEB) 0.5-2.5 mg/3 mL nebulizer  solution Take 3 mL by nebulization 4 (four) times a day 400 mL 0    meclizine (ANTIVERT) 25 mg tablet Take 1 tablet (25 mg total) by mouth 3 (three) times a day as needed for dizziness 30 tablet 0    naproxen (NAPROSYN) 500 mg tablet TAKE 1 TABLET BY MOUTH TWICE A DAY WITH MEALS 60 tablet 0    potassium chloride (Klor-Con M20) 20 mEq tablet Take 1 tablet (20 mEq total) by mouth daily 14 tablet 0    pregabalin (LYRICA) 25 mg capsule Take 1 capsule (25 mg total) by mouth 3 (three) times a day 90 capsule 1     No current facility-administered medications for this visit.       Social History:  Social History     Socioeconomic History    Marital status: Single     Spouse name: None    Number of children: None    Years of education: None    Highest education level: None   Occupational History    None   Tobacco Use    Smoking status: Every Day     Current packs/day: 0.50     Types: Cigarettes    Smokeless tobacco: Current   Vaping Use    Vaping status: Never Used   Substance and Sexual Activity    Alcohol use: Not Currently    Drug use: Not Currently     Types: Marijuana    Sexual activity: Not Currently   Other Topics Concern    None   Social History Narrative    None     Social Determinants of Health     Financial Resource Strain: Low Risk  (2/20/2024)    Overall Financial Resource Strain (CARDIA)     Difficulty of Paying Living Expenses: Not very hard   Food Insecurity: Food Insecurity Present (2/20/2024)    Hunger Vital Sign     Worried About Running Out of Food in the Last Year: Sometimes true     Ran Out of Food in the Last Year: Sometimes true   Transportation Needs: No Transportation Needs (2/20/2024)    PRAPARE - Transportation     Lack of Transportation (Medical): No     Lack of Transportation (Non-Medical): No   Physical Activity: Not on file   Stress: Not on file   Social Connections: Not on file   Intimate Partner Violence: Not on file   Housing Stability: Not on file          Review of Systems  "  Constitutional:  Negative for chills and fever.   Respiratory:  Negative for cough and shortness of breath.    Cardiovascular:  Negative for chest pain.   Gastrointestinal:  Negative for nausea and vomiting.   Musculoskeletal:  Positive for gait problem and joint swelling.   Skin:  Negative for wound.   Allergic/Immunologic: Negative for immunocompromised state.   Neurological:  Positive for numbness.   Hematological: Negative.    Psychiatric/Behavioral:  Negative for confusion.          Objective:      Resp 18   Ht 5' 2\" (1.575 m)   BMI 32.92 kg/m²          Physical Exam  Vitals reviewed.   Constitutional:       General: She is not in acute distress.     Appearance: She is not toxic-appearing or diaphoretic.   HENT:      Head: Normocephalic and atraumatic.   Eyes:      Extraocular Movements: Extraocular movements intact.   Cardiovascular:      Rate and Rhythm: Normal rate and regular rhythm.      Pulses: Normal pulses.           Dorsalis pedis pulses are 2+ on the right side and 2+ on the left side.        Posterior tibial pulses are 2+ on the right side and 2+ on the left side.   Pulmonary:      Effort: Pulmonary effort is normal. No respiratory distress.   Musculoskeletal:         General: Swelling and tenderness present.      Cervical back: Normal range of motion and neck supple.      Right foot: No foot drop.      Left foot: No foot drop.      Comments: Diffuse swelling presents left foot / ankle.  Focal pain in left sinus tarsi with decreased STJ ROM.  Diffuse pain in left arch.  No Tinel sign at left tarsal tunnel.  Sensitivity noted left dorsolateral foot / ankle.     Skin:     General: Skin is warm.      Capillary Refill: Capillary refill takes less than 2 seconds.      Coloration: Skin is not cyanotic or mottled.      Findings: No abscess.      Nails: There is no clubbing.   Neurological:      General: No focal deficit present.      Mental Status: She is alert and oriented to person, place, and time. "      Cranial Nerves: No cranial nerve deficit.      Sensory: No sensory deficit.      Motor: No weakness.      Coordination: Coordination normal.   Psychiatric:         Mood and Affect: Mood normal.         Behavior: Behavior normal.         Thought Content: Thought content normal.         Judgment: Judgment normal.

## 2024-03-07 ENCOUNTER — TELEPHONE (OUTPATIENT)
Dept: PODIATRY | Facility: CLINIC | Age: 49
End: 2024-03-07

## 2024-03-07 NOTE — TELEPHONE ENCOUNTER
Caller: [facility]    Doctor/Office: Ren Bardales DPM    #: [phone]      What needs to be faxed: treatment notes from 3/5/24    ATTN to: [facility]    Fax#: [phone]      Documents were successfully e-faxed on 3/7/24 (mw)

## 2024-03-18 ENCOUNTER — TELEPHONE (OUTPATIENT)
Age: 49
End: 2024-03-18

## 2024-03-18 DIAGNOSIS — G90.522 COMPLEX REGIONAL PAIN SYNDROME TYPE 1 OF LEFT LOWER EXTREMITY: Primary | ICD-10-CM

## 2024-03-18 DIAGNOSIS — G89.21 CHRONIC PAIN DUE TO TRAUMA: ICD-10-CM

## 2024-03-18 NOTE — TELEPHONE ENCOUNTER
Caller: Patient    Doctor: Sari    Reason for call: Patient saw POD and had injection and that did not help.      She would like to go forward with the stimulator.     Please advise     Call back#: [phone]

## 2024-03-19 NOTE — TELEPHONE ENCOUNTER
S/W pt.  Advised pt of the same and gave pt central scheduling's phone #.  Pt verbalized understanding.    Lily

## 2024-03-26 DIAGNOSIS — M79.672 LEFT FOOT PAIN: ICD-10-CM

## 2024-03-26 DIAGNOSIS — F41.9 ANXIETY AND DEPRESSION: ICD-10-CM

## 2024-03-26 DIAGNOSIS — F32.A ANXIETY AND DEPRESSION: ICD-10-CM

## 2024-03-26 RX ORDER — NAPROXEN 500 MG/1
500 TABLET ORAL 2 TIMES DAILY WITH MEALS
Qty: 60 TABLET | Refills: 0 | Status: SHIPPED | OUTPATIENT
Start: 2024-03-26

## 2024-03-26 RX ORDER — ESCITALOPRAM OXALATE 10 MG/1
10 TABLET ORAL DAILY
Qty: 30 TABLET | Refills: 0 | Status: SHIPPED | OUTPATIENT
Start: 2024-03-26

## 2024-03-30 ENCOUNTER — HOSPITAL ENCOUNTER (OUTPATIENT)
Facility: MEDICAL CENTER | Age: 49
Discharge: HOME | End: 2024-03-30
Payer: OTHER MISCELLANEOUS

## 2024-03-30 DIAGNOSIS — G89.21 CHRONIC PAIN DUE TO TRAUMA: ICD-10-CM

## 2024-03-30 DIAGNOSIS — G90.522 COMPLEX REGIONAL PAIN SYNDROME TYPE 1 OF LEFT LOWER EXTREMITY: ICD-10-CM

## 2024-03-30 PROCEDURE — 72146 MRI CHEST SPINE W/O DYE: CPT

## 2024-04-01 DIAGNOSIS — G89.4 CHRONIC PAIN SYNDROME: Primary | ICD-10-CM

## 2024-04-11 ENCOUNTER — TELEPHONE (OUTPATIENT)
Dept: OBGYN CLINIC | Facility: CLINIC | Age: 49
End: 2024-04-11

## 2024-04-25 ENCOUNTER — TRANSCRIBE ORDERS (OUTPATIENT)
Dept: PAIN MEDICINE | Facility: CLINIC | Age: 49
End: 2024-04-25

## 2024-05-15 ENCOUNTER — TELEPHONE (OUTPATIENT)
Dept: PAIN MEDICINE | Facility: CLINIC | Age: 49
End: 2024-05-15

## 2024-05-15 NOTE — TELEPHONE ENCOUNTER
Pt using work comp claim for SCS trial- spoke to Tabatha nurse manager at [employer], [phone], claim open and active with funds available.

## 2024-05-20 DIAGNOSIS — G90.522 COMPLEX REGIONAL PAIN SYNDROME TYPE 1 OF LEFT LOWER EXTREMITY: Primary | ICD-10-CM

## 2024-05-20 DIAGNOSIS — Z79.899 ENCOUNTER FOR LONG-TERM (CURRENT) USE OF OTHER MEDICATIONS: ICD-10-CM

## 2024-05-20 DIAGNOSIS — Z79.01 CHRONIC ANTICOAGULATION: ICD-10-CM

## 2024-05-21 ENCOUNTER — TRANSCRIBE ORDERS (OUTPATIENT)
Dept: PAIN MEDICINE | Facility: CLINIC | Age: 49
End: 2024-05-21

## 2024-05-22 ENCOUNTER — OFFICE VISIT (OUTPATIENT)
Dept: PAIN MEDICINE | Facility: CLINIC | Age: 49
End: 2024-05-22
Payer: OTHER MISCELLANEOUS

## 2024-05-22 ENCOUNTER — APPOINTMENT (OUTPATIENT)
Dept: LAB | Age: 49
End: 2024-05-22
Payer: OTHER MISCELLANEOUS

## 2024-05-22 VITALS
HEIGHT: 62 IN | SYSTOLIC BLOOD PRESSURE: 123 MMHG | WEIGHT: 185.6 LBS | BODY MASS INDEX: 34.16 KG/M2 | DIASTOLIC BLOOD PRESSURE: 80 MMHG

## 2024-05-22 DIAGNOSIS — E87.6 HYPOKALEMIA: ICD-10-CM

## 2024-05-22 DIAGNOSIS — G90.522 COMPLEX REGIONAL PAIN SYNDROME TYPE 1 OF LEFT LOWER EXTREMITY: Primary | ICD-10-CM

## 2024-05-22 DIAGNOSIS — Z79.899 ENCOUNTER FOR LONG-TERM (CURRENT) USE OF OTHER MEDICATIONS: ICD-10-CM

## 2024-05-22 DIAGNOSIS — G90.522 COMPLEX REGIONAL PAIN SYNDROME TYPE 1 OF LEFT LOWER EXTREMITY: ICD-10-CM

## 2024-05-22 DIAGNOSIS — Z79.01 CHRONIC ANTICOAGULATION: ICD-10-CM

## 2024-05-22 DIAGNOSIS — G89.4 CHRONIC PAIN SYNDROME: ICD-10-CM

## 2024-05-22 LAB
ALBUMIN SERPL BCP-MCNC: 3.8 G/DL (ref 3.5–5)
ALP SERPL-CCNC: 88 U/L (ref 34–104)
ALT SERPL W P-5'-P-CCNC: 16 U/L (ref 7–52)
ANION GAP SERPL CALCULATED.3IONS-SCNC: 11 MMOL/L (ref 4–13)
APTT PPP: 34 SECONDS (ref 23–37)
AST SERPL W P-5'-P-CCNC: 18 U/L (ref 13–39)
BASOPHILS # BLD AUTO: 0.03 THOUSANDS/ÂΜL (ref 0–0.1)
BASOPHILS NFR BLD AUTO: 0 % (ref 0–1)
BILIRUB SERPL-MCNC: 0.25 MG/DL (ref 0.2–1)
BUN SERPL-MCNC: 15 MG/DL (ref 5–25)
CALCIUM SERPL-MCNC: 8.6 MG/DL (ref 8.4–10.2)
CHLORIDE SERPL-SCNC: 107 MMOL/L (ref 96–108)
CO2 SERPL-SCNC: 22 MMOL/L (ref 21–32)
CREAT SERPL-MCNC: 0.78 MG/DL (ref 0.6–1.3)
EOSINOPHIL # BLD AUTO: 0.35 THOUSAND/ÂΜL (ref 0–0.61)
EOSINOPHIL NFR BLD AUTO: 4 % (ref 0–6)
ERYTHROCYTE [DISTWIDTH] IN BLOOD BY AUTOMATED COUNT: 13.1 % (ref 11.6–15.1)
EST. AVERAGE GLUCOSE BLD GHB EST-MCNC: 117 MG/DL
GFR SERPL CREATININE-BSD FRML MDRD: 90 ML/MIN/1.73SQ M
GLUCOSE P FAST SERPL-MCNC: 88 MG/DL (ref 65–99)
HBA1C MFR BLD: 5.7 %
HCT VFR BLD AUTO: 42.2 % (ref 34.8–46.1)
HGB BLD-MCNC: 13.8 G/DL (ref 11.5–15.4)
IMM GRANULOCYTES # BLD AUTO: 0.03 THOUSAND/UL (ref 0–0.2)
IMM GRANULOCYTES NFR BLD AUTO: 0 % (ref 0–2)
INR PPP: 1.1 (ref 0.84–1.19)
LYMPHOCYTES # BLD AUTO: 2.57 THOUSANDS/ÂΜL (ref 0.6–4.47)
LYMPHOCYTES NFR BLD AUTO: 31 % (ref 14–44)
MCH RBC QN AUTO: 32.3 PG (ref 26.8–34.3)
MCHC RBC AUTO-ENTMCNC: 32.7 G/DL (ref 31.4–37.4)
MCV RBC AUTO: 99 FL (ref 82–98)
MONOCYTES # BLD AUTO: 0.71 THOUSAND/ÂΜL (ref 0.17–1.22)
MONOCYTES NFR BLD AUTO: 9 % (ref 4–12)
NEUTROPHILS # BLD AUTO: 4.69 THOUSANDS/ÂΜL (ref 1.85–7.62)
NEUTS SEG NFR BLD AUTO: 56 % (ref 43–75)
NRBC BLD AUTO-RTO: 0 /100 WBCS
PLATELET # BLD AUTO: 356 THOUSANDS/UL (ref 149–390)
PMV BLD AUTO: 10.3 FL (ref 8.9–12.7)
POTASSIUM SERPL-SCNC: 3.7 MMOL/L (ref 3.5–5.3)
PROT SERPL-MCNC: 7.1 G/DL (ref 6.4–8.4)
PROTHROMBIN TIME: 14.1 SECONDS (ref 11.6–14.5)
RBC # BLD AUTO: 4.27 MILLION/UL (ref 3.81–5.12)
SODIUM SERPL-SCNC: 140 MMOL/L (ref 135–147)
WBC # BLD AUTO: 8.38 THOUSAND/UL (ref 4.31–10.16)

## 2024-05-22 PROCEDURE — 36415 COLL VENOUS BLD VENIPUNCTURE: CPT

## 2024-05-22 PROCEDURE — 85025 COMPLETE CBC W/AUTO DIFF WBC: CPT

## 2024-05-22 PROCEDURE — 85610 PROTHROMBIN TIME: CPT

## 2024-05-22 PROCEDURE — 85730 THROMBOPLASTIN TIME PARTIAL: CPT

## 2024-05-22 PROCEDURE — 83036 HEMOGLOBIN GLYCOSYLATED A1C: CPT

## 2024-05-22 PROCEDURE — 99214 OFFICE O/P EST MOD 30 MIN: CPT | Performed by: ANESTHESIOLOGY

## 2024-05-22 PROCEDURE — 80053 COMPREHEN METABOLIC PANEL: CPT

## 2024-05-22 NOTE — PROGRESS NOTES
Assessment    1. Complex regional pain syndrome type 1 of left lower extremity        2. Chronic pain syndrome          Plan  The spinal cord stimulator trial was discussed in depth with the patient. The patient was advised that a stimulator lead will be placed percutaneously through an epidural needle, with intra-op stimulation done to confirm appropriate lead placement.  The lead will then be connected to an external generator and secured to the skin.  The patient was advised that an industry clinical specialist will be present for the trial, program the stimulator and explain how to use it.  During the trial, the patient was instructed to perform their activities of daily living, but limit twisting and bending motions, and no lifting greater than 10 pounds.  The patient was advised to refrain from tub baths, showers, swimming, and hot tubs during the trial. The patient will return to the office for trial evaluation and lead removal on 6/5/24. At that time, if the trial is successful, the patient will be referred to Neurosurgery for permanent spinal cord stimulator placement.     Pre-procedure instructions were reviewed with the patient. The patient was given a prescription for blood work to be done. Complete risks and benefits including bleeding, infection, headache, tissue reaction, nerve injury and allergic reaction were discussed. The approach was demonstrated using models and literature was provided. The patient was advised that they would receive pre-procedure antibiotics and a prescription to take during the week of the trial.    The patient was advised to hold naproxen starting on 5/26/24 and for the duration of the trial    The patient will next follow- up on 5/30/24 for the stimulator trial.    I have spent a total time of 30 minutes on 05/22/24 in caring for this patient including Diagnostic results, Risks and benefits of tx options, Instructions for management, Patient and family education,  Impressions, Documenting in the medical record, Reviewing / ordering tests, medicine, procedures  , and Obtaining or reviewing history  .        History of Present Illness  The patient is a 49 y.o. female scheduled for an [employer] spinal cord stimulator trial to treat chronic pain from CRPS 1 of the LLE.  The current pain pattern includes left lower extremity (foot predominant) pain.   The patient's goals for the trial include decreased pain levels and increased level of function with IADLs.    I have personally reviewed and/or updated the patient's past medical history, past surgical history, family history, social history, current medications, allergies, and vital signs today.     Review of Systems     Past Medical History:   Diagnosis Date    Asthma     Gallstone     Migraine        Past Surgical History:   Procedure Laterality Date    CHOLECYSTECTOMY  2016       Family History   Problem Relation Age of Onset    Asthma Mother     No Known Problems Sister     No Known Problems Maternal Grandmother     No Known Problems Maternal Grandfather     No Known Problems Paternal Grandmother     No Known Problems Paternal Grandfather     No Known Problems Maternal Aunt     No Known Problems Paternal Aunt     Breast cancer Paternal Aunt     No Known Problems Paternal Aunt     No Known Problems Paternal Aunt        Social History     Occupational History    Not on file   Tobacco Use    Smoking status: Every Day     Current packs/day: 0.50     Types: Cigarettes    Smokeless tobacco: Current   Vaping Use    Vaping status: Never Used   Substance and Sexual Activity    Alcohol use: Not Currently    Drug use: Not Currently     Types: Marijuana    Sexual activity: Not Currently         Current Outpatient Medications:     albuterol (Ventolin HFA) 90 mcg/act inhaler, Inhale 2 puffs every 6 (six) hours as needed for wheezing, Disp: 18 g, Rfl: 0    atorvastatin (LIPITOR) 20 mg tablet, Take 1 tablet (20 mg total) by mouth daily, Disp:  "90 tablet, Rfl: 1    cyanocobalamin (VITAMIN B-12) 1000 MCG tablet, Take 1 tablet (1,000 mcg total) by mouth daily, Disp: 90 tablet, Rfl: 3    escitalopram (LEXAPRO) 10 mg tablet, TAKE 1 TABLET BY MOUTH EVERY DAY, Disp: 30 tablet, Rfl: 0    Fluticasone-Salmeterol (Advair Diskus) 100-50 mcg/dose inhaler, Inhale 1 puff 2 (two) times a day Rinse mouth after use., Disp: 60 blister, Rfl: 0    hydrOXYzine HCL (ATARAX) 25 mg tablet, Take 1 tablet (25 mg total) by mouth 3 (three) times a day as needed for anxiety, Disp: 40 tablet, Rfl: 0    ipratropium-albuterol (DUO-NEB) 0.5-2.5 mg/3 mL nebulizer solution, Take 3 mL by nebulization 4 (four) times a day, Disp: 400 mL, Rfl: 0    meclizine (ANTIVERT) 25 mg tablet, Take 1 tablet (25 mg total) by mouth 3 (three) times a day as needed for dizziness, Disp: 30 tablet, Rfl: 0    naproxen (NAPROSYN) 500 mg tablet, TAKE 1 TABLET BY MOUTH TWICE A DAY WITH MEALS, Disp: 60 tablet, Rfl: 0    potassium chloride (Klor-Con M20) 20 mEq tablet, Take 1 tablet (20 mEq total) by mouth daily, Disp: 14 tablet, Rfl: 0    pregabalin (LYRICA) 25 mg capsule, Take 1 capsule (25 mg total) by mouth 3 (three) times a day, Disp: 90 capsule, Rfl: 1    Current Facility-Administered Medications:     lidocaine (XYLOCAINE) 1 % injection 2 mL, 2 mL, Injection, , , 2 mL at 03/05/24 1045    triamcinolone acetonide (KENALOG-40) 40 mg/mL injection 20 mg, 20 mg, Intra-articular, , , 20 mg at 03/05/24 1045    No Known Allergies    Physical Exam    /80   Ht 5' 2\" (1.575 m)   Wt 84.2 kg (185 lb 9.6 oz)   BMI 33.95 kg/m²     Constitutional:normal, well developed, well nourished, alert, in no distress and non-toxic and no overt pain behavior.  Eyes:anicteric  HEENT:grossly intact  Neck:supple, symmetric, trachea midline and no masses   Pulmonary:even and unlabored  Cardiovascular:No edema or pitting edema present  Skin:Normal without rashes or lesions and well hydrated  Psychiatric:Mood and affect " appropriate  Neurologic: Motor function is grossly intact with no focal neurologic deficits   Musculoskeletal: Limited LLE ROM with pain and allodynia. Use of cane.    Imaging

## 2024-05-30 ENCOUNTER — TELEPHONE (OUTPATIENT)
Dept: PAIN MEDICINE | Facility: CLINIC | Age: 49
End: 2024-05-30

## 2024-05-30 ENCOUNTER — HOSPITAL ENCOUNTER (OUTPATIENT)
Dept: RADIOLOGY | Facility: MEDICAL CENTER | Age: 49
Discharge: HOME | End: 2024-05-30
Admitting: ANESTHESIOLOGY
Payer: OTHER MISCELLANEOUS

## 2024-05-30 VITALS
SYSTOLIC BLOOD PRESSURE: 149 MMHG | OXYGEN SATURATION: 96 % | HEIGHT: 61 IN | RESPIRATION RATE: 20 BRPM | BODY MASS INDEX: 34.93 KG/M2 | DIASTOLIC BLOOD PRESSURE: 88 MMHG | WEIGHT: 185 LBS | TEMPERATURE: 97.3 F | HEART RATE: 75 BPM

## 2024-05-30 DIAGNOSIS — G89.4 CHRONIC PAIN SYNDROME: ICD-10-CM

## 2024-05-30 PROBLEM — G90.522 COMPLEX REGIONAL PAIN SYNDROME TYPE 1 OF LEFT LOWER EXTREMITY: Status: ACTIVE | Noted: 2024-05-30

## 2024-05-30 PROCEDURE — 63650 IMPLANT NEUROELECTRODES: CPT | Performed by: ANESTHESIOLOGY

## 2024-05-30 PROCEDURE — C1897 LEAD, NEUROSTIM TEST KIT: HCPCS

## 2024-05-30 PROCEDURE — C1787 PATIENT PROGR, NEUROSTIM: HCPCS

## 2024-05-30 PROCEDURE — 96374 THER/PROPH/DIAG INJ IV PUSH: CPT

## 2024-05-30 RX ORDER — CEFAZOLIN SODIUM 2 G/50ML
2000 SOLUTION INTRAVENOUS ONCE
Status: COMPLETED | OUTPATIENT
Start: 2024-05-30 | End: 2024-05-30

## 2024-05-30 RX ADMIN — LIDOCAINE HYDROCHLORIDE 10 ML: 20 INJECTION, SOLUTION EPIDURAL; INFILTRATION; INTRACAUDAL; PERINEURAL at 11:17

## 2024-05-30 RX ADMIN — CEFAZOLIN SODIUM 2000 MG: 2 SOLUTION INTRAVENOUS at 10:30

## 2024-05-30 NOTE — DISCHARGE INSTRUCTIONS
"SPINE AND PAIN: SPINAL CORD STIMULATION/PERIPHERAL NERVE STIMULATION TRIAL/ PERMANENT IMPLANT DISCHARGE INSTRUCTIONS    ACTIVITY RESTRICTIONS DURING TRIAL PERIOD  Do not drive with the SCS \"on\".  Do not bend or twist at the waist.  Do not lift anything that weighs over 5 pounds.  When you lie down, \"log roll\" (keep spine aligned) to change positions. Do not sleep on your stomach.  Limit stair climbing and strenuous activity.    CARE OF THE INJECTION SITE  CHECK THE DRESSING DAILY. It should intact.  Notify the [facility] if you have any of the following: redness, drainage, swelling, chills or fever over 100°F.  Do not change dressing, only reinforce edges if necessary.  Keep the dressing dry. Do not shower, (sponge baths only) until evaluated in office.    SPECIAL INSTRUCTIONS  Take your temperature daily.  Follow-up for lead removal scheduled for June 5 to arrive at 9:45.  The  box is expensive. DO NOT drop or get wet.  Do not pull on the cable or leads. Do not disconnect the cable and lead.   Do activities that normally cause you to have pain and try different  settings.  Obtain post procedure x-ray shortly after procedure if ordered by physician.     MEDICATIONS  Continue to take all routine medications.  Our office may have instructed you to hold some medications.  You may resume __.  Continue to hold naproxen.  Take antiobiotic as prescribed: N/A.    If you have any problems specifically related to your procedure, please call our office at [phone]. Problems not related to your procedure should be directed at your primary care physician.    Contact the company representative with any questions regarding settings or operation of the external  box.    As no general anesthesia was used in today's procedure, you should not experience any side effects related to anesthesia.    "

## 2024-05-30 NOTE — H&P
History of Present Illness: The patient is a 49 y.o. female who presents with complaints of left leg pain    Past Medical History:   Diagnosis Date    Asthma     Gallstone     Migraine        Past Surgical History:   Procedure Laterality Date    CHOLECYSTECTOMY  2016         Current Outpatient Medications:     albuterol (Ventolin HFA) 90 mcg/act inhaler, Inhale 2 puffs every 6 (six) hours as needed for wheezing, Disp: 18 g, Rfl: 0    atorvastatin (LIPITOR) 20 mg tablet, Take 1 tablet (20 mg total) by mouth daily, Disp: 90 tablet, Rfl: 1    cyanocobalamin (VITAMIN B-12) 1000 MCG tablet, Take 1 tablet (1,000 mcg total) by mouth daily, Disp: 90 tablet, Rfl: 3    escitalopram (LEXAPRO) 10 mg tablet, TAKE 1 TABLET BY MOUTH EVERY DAY, Disp: 30 tablet, Rfl: 0    Fluticasone-Salmeterol (Advair Diskus) 100-50 mcg/dose inhaler, Inhale 1 puff 2 (two) times a day Rinse mouth after use., Disp: 60 blister, Rfl: 0    hydrOXYzine HCL (ATARAX) 25 mg tablet, Take 1 tablet (25 mg total) by mouth 3 (three) times a day as needed for anxiety, Disp: 40 tablet, Rfl: 0    ipratropium-albuterol (DUO-NEB) 0.5-2.5 mg/3 mL nebulizer solution, Take 3 mL by nebulization 4 (four) times a day, Disp: 400 mL, Rfl: 0    meclizine (ANTIVERT) 25 mg tablet, Take 1 tablet (25 mg total) by mouth 3 (three) times a day as needed for dizziness, Disp: 30 tablet, Rfl: 0    naproxen (NAPROSYN) 500 mg tablet, TAKE 1 TABLET BY MOUTH TWICE A DAY WITH MEALS, Disp: 60 tablet, Rfl: 0    potassium chloride (Klor-Con M20) 20 mEq tablet, Take 1 tablet (20 mEq total) by mouth daily, Disp: 14 tablet, Rfl: 0    pregabalin (LYRICA) 25 mg capsule, Take 1 capsule (25 mg total) by mouth 3 (three) times a day, Disp: 90 capsule, Rfl: 1    Current Facility-Administered Medications:     ceFAZolin (ANCEF) IVPB (premix in dextrose) 2,000 mg 50 mL, 2,000 mg, Intravenous, Once, Will Kris Guo MD    lidocaine (PF) (XYLOCAINE-MPF) 2 % injection 10 mL, 10 mL, Infiltration, Once, Will  Kris Guo MD    lidocaine (XYLOCAINE) 1 % injection 2 mL, 2 mL, Injection, , , 2 mL at 03/05/24 1045    triamcinolone acetonide (KENALOG-40) 40 mg/mL injection 20 mg, 20 mg, Intra-articular, , , 20 mg at 03/05/24 1045    No Known Allergies    Physical Exam:   Vitals:    05/30/24 0951   BP: 152/85   Pulse: 85   Resp: 20   Temp: (!) 97.3 °F (36.3 °C)   SpO2: 96%     General: Awake, Alert, Oriented x 3, Mood and affect appropriate  Respiratory: Respirations even and unlabored  Cardiovascular: Peripheral pulses intact; no edema  Musculoskeletal Exam: left leg pain    ASA Score: 2    Patient/Chart Verification  Patient ID Verified: Verbal  ID Band Applied: No  Consents Confirmed: Procedural, To be obtained in the Pre-Procedure area  H&P( within 30 days) Verified: To be obtained in the Pre-Procedure area  Interval H&P(within 24 hr) Complete (required for Outpatients and Surgery Admit only): To be obtained in the Pre-Procedure area  Allergies Reviewed: Yes  Anticoag/NSAID held?: Yes (no naproxyn in 4 days)  Currently on antibiotics?: No  Pregnancy denied?: Yes    Assessment:   CRPS 1 of left lower extremity  Chronic pain syndome      Plan: [employer] thoracic SCS trial

## 2024-05-31 NOTE — TELEPHONE ENCOUNTER
S/W pt.  Pt stated she has a little pain and dressing looks good.  She does not have a thermometer, she forgot to get one.  Advised her to get one and to call [facility] if gets temperature of 100 or greater.  She has the reps contact #.  Confirmed next appt with pt.  Pt verbalized understanding.

## 2024-06-05 ENCOUNTER — OFFICE VISIT (OUTPATIENT)
Dept: PAIN MEDICINE | Facility: CLINIC | Age: 49
End: 2024-06-05

## 2024-06-05 VITALS — BODY MASS INDEX: 34.93 KG/M2 | HEIGHT: 61 IN | WEIGHT: 185 LBS

## 2024-06-05 DIAGNOSIS — M79.2 NEUROPATHIC PAIN: ICD-10-CM

## 2024-06-05 DIAGNOSIS — G90.522 COMPLEX REGIONAL PAIN SYNDROME TYPE 1 OF LEFT LOWER EXTREMITY: Primary | ICD-10-CM

## 2024-06-05 DIAGNOSIS — M79.672 LEFT FOOT PAIN: ICD-10-CM

## 2024-06-05 PROCEDURE — 99024 POSTOP FOLLOW-UP VISIT: CPT | Performed by: ANESTHESIOLOGY

## 2024-06-05 RX ORDER — PREGABALIN 75 MG/1
75 CAPSULE ORAL 3 TIMES DAILY
Qty: 90 CAPSULE | Refills: 0 | Status: SHIPPED | OUTPATIENT
Start: 2024-06-05

## 2024-06-05 NOTE — PROGRESS NOTES
Assessment  1. Complex regional pain syndrome type 1 of left lower extremity  Ambulatory referral to Physical Therapy    pregabalin (LYRICA) 75 mg capsule      2. Neuropathic pain        3. Left foot pain  Diclofenac Sodium (VOLTAREN) 1 %    pregabalin (LYRICA) 75 mg capsule        Patient presenting for follow up in regards to chronic left foot pain that has persisted after crush injury at work 8/16/23. She previously had exhausted PT/OT. Thought she is able to walk without assistance, she has intermittent episodes of severe pain that is limiting her ADLs. She recently completed a FCE.     Pain is consistent with CRPS I, neuropathic pain and accompanied by pain >7/10 on the pain scale with inability to participate in IADLs for >6 weeks. Patient has fully participated with physical therapy that is still recommended to continue at this point.     Patient does fulfill the diagnosis for CRPS based on the [place] criteria as follows:     The patient has continued pain which is disproportionate to any inciting event _X_  The patient has at least one sign in 2 or more of the following categories _X_  The patient reports at least 1 symptom and 3 of more categories _X_  No other diagnosis can better explain the signs and symptoms _X_     Sensory: Allodynia to light touch and/or temperature sensation and/or deep somatic pressure and/or hyperalgesia to pinprick      Vasomotor: Temperature asymmetry and/or skin color changes and/or skin color asymmetry     Sudomotor: Edema and/or sweating changes and or sweating asymmetry     Motor/trophic: Decreased ROM and/or motor dysfunction (weakness, tremor, dystonia) and/or trophic changes (hair/nail/skin)     Previously failed gabapentin. Lyrica 25mg TID has not helped. Failed lidocaine cream. States that Bengay helps more.     Plan  The patient had a unsuccessful Medtronic spinal cord stimulator trial.      At this time we will uptitrate Lyrica to 75mg TID and also trial Voltaren  gel.    Discussed Butrans patch but she is not ready for this modality yet.     F/u in 1 month.    Reviewed external notes from PCP, Podiatry offices in regards to current and prior treatments tried.    Reviewed pertinent laboratory studies, specifically renal function, hemoglobin A1c, CBC, coagulation studies, prior to recommending medication therapies/interventional treatment options.     [place] Prescription Drug Monitoring Program report was reviewed and was appropriate      My impressions and treatment recommendations were discussed in detail with the patient who verbalized understanding and had no further questions.  Discharge instructions were provided. I personally saw and examined the patient and I agree with the above discussed plan of care.       Orders Placed This Encounter   Procedures    Ambulatory referral to Physical Therapy     Standing Status:   Future     Standing Expiration Date:   6/5/2025     Referral Priority:   Routine     Referral Type:   Physical Therapy     Referral Reason:   Specialty Services Required     Requested Specialty:   Physical Therapy     Number of Visits Requested:   1     Expiration Date:   6/5/2025         History of Present Illness    The patient is a 49 y.o. female status post Medtronic spinal cord stimulator percutaneous lead placement on 5/30/24 . She noted no improvement with the SCS trial    Review of Systems   Constitutional:  Negative for chills and fever.   HENT:  Negative for ear pain and sore throat.    Eyes:  Negative for pain and visual disturbance.   Respiratory:  Negative for cough and shortness of breath.    Cardiovascular:  Negative for chest pain and palpitations.   Gastrointestinal:  Negative for abdominal pain and vomiting.   Genitourinary:  Negative for dysuria and hematuria.   Musculoskeletal:  Positive for myalgias. Negative for arthralgias and back pain.   Skin:  Negative for color change and rash.   Neurological:  Positive for weakness and  numbness. Negative for seizures and syncope.   All other systems reviewed and are negative.      Patient Active Problem List   Diagnosis    Abdominal pain    Elevated LFTs    Cholelithiasis    Choledocholithiasis    Tobacco use    Asthma    Anxiety and depression    Dizziness    Numbness and tingling in both hands    Acute bilateral low back pain without sciatica    Missed period    Crushing injury of left foot    Contusion of left ankle    Contusion of left foot    Complex regional pain syndrome type 1 of left lower extremity    Chronic pain syndrome        Past Medical History:   Diagnosis Date    Asthma     Gallstone     Migraine        Past Surgical History:   Procedure Laterality Date    CHOLECYSTECTOMY  2016       Family History   Problem Relation Age of Onset    Asthma Mother     No Known Problems Sister     No Known Problems Maternal Grandmother     No Known Problems Maternal Grandfather     No Known Problems Paternal Grandmother     No Known Problems Paternal Grandfather     No Known Problems Maternal Aunt     No Known Problems Paternal Aunt     Breast cancer Paternal Aunt     No Known Problems Paternal Aunt     No Known Problems Paternal Aunt        Social History     Occupational History    Not on file   Tobacco Use    Smoking status: Every Day     Current packs/day: 0.50     Types: Cigarettes    Smokeless tobacco: Current   Vaping Use    Vaping status: Never Used   Substance and Sexual Activity    Alcohol use: Not Currently    Drug use: Not Currently     Types: Marijuana    Sexual activity: Not Currently         Current Outpatient Medications:     albuterol (Ventolin HFA) 90 mcg/act inhaler, Inhale 2 puffs every 6 (six) hours as needed for wheezing, Disp: 18 g, Rfl: 0    atorvastatin (LIPITOR) 20 mg tablet, Take 1 tablet (20 mg total) by mouth daily, Disp: 90 tablet, Rfl: 1    cyanocobalamin (VITAMIN B-12) 1000 MCG tablet, Take 1 tablet (1,000 mcg total) by mouth daily, Disp: 90 tablet, Rfl: 3     "Diclofenac Sodium (VOLTAREN) 1 %, Apply 2 g topically 3 (three) times a day, Disp: 100 g, Rfl: 2    escitalopram (LEXAPRO) 10 mg tablet, TAKE 1 TABLET BY MOUTH EVERY DAY, Disp: 30 tablet, Rfl: 0    Fluticasone-Salmeterol (Advair Diskus) 100-50 mcg/dose inhaler, Inhale 1 puff 2 (two) times a day Rinse mouth after use., Disp: 60 blister, Rfl: 0    hydrOXYzine HCL (ATARAX) 25 mg tablet, Take 1 tablet (25 mg total) by mouth 3 (three) times a day as needed for anxiety, Disp: 40 tablet, Rfl: 0    ipratropium-albuterol (DUO-NEB) 0.5-2.5 mg/3 mL nebulizer solution, Take 3 mL by nebulization 4 (four) times a day, Disp: 400 mL, Rfl: 0    meclizine (ANTIVERT) 25 mg tablet, Take 1 tablet (25 mg total) by mouth 3 (three) times a day as needed for dizziness, Disp: 30 tablet, Rfl: 0    naproxen (NAPROSYN) 500 mg tablet, TAKE 1 TABLET BY MOUTH TWICE A DAY WITH MEALS, Disp: 60 tablet, Rfl: 0    potassium chloride (Klor-Con M20) 20 mEq tablet, Take 1 tablet (20 mEq total) by mouth daily, Disp: 14 tablet, Rfl: 0    pregabalin (LYRICA) 75 mg capsule, Take 1 capsule (75 mg total) by mouth 3 (three) times a day, Disp: 90 capsule, Rfl: 0    Current Facility-Administered Medications:     lidocaine (XYLOCAINE) 1 % injection 2 mL, 2 mL, Injection, , , 2 mL at 03/05/24 1045    triamcinolone acetonide (KENALOG-40) 40 mg/mL injection 20 mg, 20 mg, Intra-articular, , , 20 mg at 03/05/24 1045    No Known Allergies      Physical Exam    Ht 5' 1\" (1.549 m)   Wt 83.9 kg (185 lb)   BMI 34.96 kg/m²     Thoracic Spine:  Spinal cord stimulator lead removed with tip intact; no erythema, tenderness or signs of infection; area cleansed with alcohol and bandage placed  "

## 2024-06-13 ENCOUNTER — TELEPHONE (OUTPATIENT)
Dept: PAIN MEDICINE | Facility: CLINIC | Age: 49
End: 2024-06-13

## 2024-06-13 NOTE — TELEPHONE ENCOUNTER
Caller: Nurse     Doctor: Sari    Reason for call: is patient going to go thru with permanent Stim trial or what is her next step     Fax # [phone]    Call back#: [phone]

## 2024-06-13 NOTE — TELEPHONE ENCOUNTER
Attempted to reach RN case manager- NANCY Mays with pt's name, , [place] and CB#.      Per WS last OVS on 2024:    Plan  The patient had a unsuccessful Medtronic spinal cord stimulator trial.       At this time we will uptitrate Lyrica to 75mg TID and also trial Voltaren gel.     Discussed Butrans patch but she is not ready for this modality yet.      F/u in 1 month.    PT order placed as well.

## 2024-06-13 NOTE — TELEPHONE ENCOUNTER
Caller: Tana colon/ [employer]     Doctor:     Reason for call: Returning call looking to speak to nurse     Call back#: [phone]

## 2024-07-09 ENCOUNTER — OFFICE VISIT (OUTPATIENT)
Dept: PAIN MEDICINE | Facility: CLINIC | Age: 49
End: 2024-07-09
Payer: OTHER MISCELLANEOUS

## 2024-07-09 VITALS — HEIGHT: 61 IN | WEIGHT: 185 LBS | BODY MASS INDEX: 34.93 KG/M2

## 2024-07-09 DIAGNOSIS — M25.572 CHRONIC PAIN OF LEFT ANKLE: ICD-10-CM

## 2024-07-09 DIAGNOSIS — G89.29 CHRONIC PAIN OF LEFT ANKLE: ICD-10-CM

## 2024-07-09 DIAGNOSIS — M79.672 LEFT FOOT PAIN: Primary | ICD-10-CM

## 2024-07-09 PROCEDURE — 99214 OFFICE O/P EST MOD 30 MIN: CPT | Performed by: ANESTHESIOLOGY

## 2024-07-09 NOTE — PROGRESS NOTES
Assessment:  1. Left foot pain    2. Chronic pain of left ankle          Patient presenting for follow up in regards to chronic left foot and ankle pain that has persisted after crush injury at work 8/16/23. She has had extensive PT/OT. Though she is able to walk without assistance, she has frequent episodes of severe pain that is limiting her ADLs. S     Symptoms are accompanied by pain >7/10 on the pain scale with inability to participate in IADLs for >6 weeks. Patient has fully participated with physical therapy that is still recommended to continue at this point.     Previously failed gabapentin. Lyrica titrated up to 75mg TID has not helped. Failed lidocaine cream.     Plan:    At this time, I am not fully convinced that her symptoms are fully consistent with CRPS I, given that she has no discernible benefit with spinal cord stimulator trial and neuropathic agents.     At this time we will obtain updated foot and ankle x-rays to rule out orthopedic pathologies.    She has noted dizziness and nausea, thus I recommended to wean Lyrica.     I instructed her to wean off of Lyrica 75 mg 3 times daily.  She will take 75 mg twice daily for 1 week then 75 mg daily for 1 week, then discontinue the medication.    Follow up: TBD pending XR reads.     Reviewed external notes from PCP, Podiatry offices in regards to current and prior treatments tried.     Reviewed pertinent laboratory studies, specifically renal function, hemoglobin A1c, CBC, coagulation studies, prior to recommending medication therapies/interventional treatment options.     [place] Prescription Drug Monitoring Program report was reviewed and was appropriate     My impressions and treatment recommendations were discussed in detail with the patient who verbalized understanding and had no further questions.  Discharge instructions were provided. I personally saw and examined the patient and I agree with the above discussed plan of care.    I have spent  a total time of 30 minutes in caring for this patient on the day of the visit/encounter including Diagnostic results, Prognosis, Risks and benefits of tx options, Instructions for management, Impressions, Counseling / Coordination of care, Documenting in the medical record, Reviewing / ordering tests, medicine, procedures  , and Obtaining or reviewing history  .      Orders Placed This Encounter   Procedures    XR foot 3+ vw left     c     Standing Status:   Future     Number of Occurrences:   1     Standing Expiration Date:   7/9/2028     Scheduling Instructions:      Bring along any outside films relating to this procedure.           Order Specific Question:   Is the patient pregnant?     Answer:   No     No orders of the defined types were placed in this encounter.      History of Present Illness:  Shana Ceja is a 49 y.o. female who presents for a follow up office visit in regards to left foot and ankle pain. The patient’s current symptoms include worsening and continued left foot and ankle pain.  Pain is rated 7 out of 10 on pain scale describes a constant burning, aching, sharp, throbbing, cramping, pressure-like pain with numbness and pins/needles throughout the entire day.    I have personally reviewed and/or updated the patient's past medical history, past surgical history, family history, social history, current medications, allergies, and vital signs today.     Review of Systems   Constitutional:  Negative for chills and fever.   HENT:  Negative for ear pain and sore throat.    Eyes:  Negative for pain and visual disturbance.   Respiratory:  Negative for cough and shortness of breath.    Cardiovascular:  Negative for chest pain and palpitations.   Gastrointestinal:  Negative for abdominal pain and vomiting.   Genitourinary:  Negative for dysuria and hematuria.   Musculoskeletal:  Positive for gait problem and myalgias. Negative for arthralgias and back pain.   Skin:  Negative for color change and rash.    Neurological:  Positive for weakness and numbness. Negative for seizures and syncope.   All other systems reviewed and are negative.      Patient Active Problem List   Diagnosis    Abdominal pain    Elevated LFTs    Cholelithiasis    Choledocholithiasis    Tobacco use    Asthma    Anxiety and depression    Dizziness    Numbness and tingling in both hands    Acute bilateral low back pain without sciatica    Missed period    Crushing injury of left foot    Contusion of left ankle    Contusion of left foot    Complex regional pain syndrome type 1 of left lower extremity    Chronic pain syndrome       Past Medical History:   Diagnosis Date    Asthma     Gallstone     Migraine        Past Surgical History:   Procedure Laterality Date    CHOLECYSTECTOMY  2016       Family History   Problem Relation Age of Onset    Asthma Mother     No Known Problems Sister     No Known Problems Maternal Grandmother     No Known Problems Maternal Grandfather     No Known Problems Paternal Grandmother     No Known Problems Paternal Grandfather     No Known Problems Maternal Aunt     No Known Problems Paternal Aunt     Breast cancer Paternal Aunt     No Known Problems Paternal Aunt     No Known Problems Paternal Aunt        Social History     Occupational History    Not on file   Tobacco Use    Smoking status: Every Day     Current packs/day: 0.50     Types: Cigarettes    Smokeless tobacco: Current   Vaping Use    Vaping status: Never Used   Substance and Sexual Activity    Alcohol use: Not Currently    Drug use: Not Currently     Types: Marijuana    Sexual activity: Not Currently       Current Outpatient Medications on File Prior to Visit   Medication Sig    albuterol (Ventolin HFA) 90 mcg/act inhaler Inhale 2 puffs every 6 (six) hours as needed for wheezing    atorvastatin (LIPITOR) 20 mg tablet Take 1 tablet (20 mg total) by mouth daily    cyanocobalamin (VITAMIN B-12) 1000 MCG tablet Take 1 tablet (1,000 mcg total) by mouth daily     "Diclofenac Sodium (VOLTAREN) 1 % Apply 2 g topically 3 (three) times a day    escitalopram (LEXAPRO) 10 mg tablet TAKE 1 TABLET BY MOUTH EVERY DAY    Fluticasone-Salmeterol (Advair Diskus) 100-50 mcg/dose inhaler Inhale 1 puff 2 (two) times a day Rinse mouth after use.    hydrOXYzine HCL (ATARAX) 25 mg tablet Take 1 tablet (25 mg total) by mouth 3 (three) times a day as needed for anxiety    ipratropium-albuterol (DUO-NEB) 0.5-2.5 mg/3 mL nebulizer solution Take 3 mL by nebulization 4 (four) times a day    meclizine (ANTIVERT) 25 mg tablet Take 1 tablet (25 mg total) by mouth 3 (three) times a day as needed for dizziness    naproxen (NAPROSYN) 500 mg tablet TAKE 1 TABLET BY MOUTH TWICE A DAY WITH MEALS    potassium chloride (Klor-Con M20) 20 mEq tablet Take 1 tablet (20 mEq total) by mouth daily    pregabalin (LYRICA) 75 mg capsule Take 1 capsule (75 mg total) by mouth 3 (three) times a day     Current Facility-Administered Medications on File Prior to Visit   Medication    lidocaine (XYLOCAINE) 1 % injection 2 mL    triamcinolone acetonide (KENALOG-40) 40 mg/mL injection 20 mg       No Known Allergies    Physical Exam:    Ht 5' 1\" (1.549 m)   Wt 83.9 kg (185 lb)   BMI 34.96 kg/m²     Constitutional:normal, well developed, well nourished, alert, in no distress and non-toxic and no overt pain behavior.  Eyes:anicteric  HEENT:grossly intact  Neck:supple, symmetric, trachea midline and no masses   Pulmonary:even and unlabored  Cardiovascular:No edema or pitting edema present  Skin:Normal without rashes or lesions and well hydrated  Psychiatric:Mood and affect appropriate  Neurologic: Motor function is grossly intact with no focal neurologic deficits   Musculoskeletal: Limited left foot and ankle range of motion.    Imaging    "

## 2024-07-10 ENCOUNTER — APPOINTMENT (OUTPATIENT)
Dept: LAB | Facility: CLINIC | Age: 49
End: 2024-07-10
Payer: COMMERCIAL

## 2024-07-10 ENCOUNTER — OFFICE VISIT (OUTPATIENT)
Dept: FAMILY MEDICINE CLINIC | Facility: CLINIC | Age: 49
End: 2024-07-10

## 2024-07-10 VITALS
DIASTOLIC BLOOD PRESSURE: 78 MMHG | SYSTOLIC BLOOD PRESSURE: 111 MMHG | OXYGEN SATURATION: 98 % | BODY MASS INDEX: 34.7 KG/M2 | TEMPERATURE: 98.3 F | HEIGHT: 61 IN | RESPIRATION RATE: 18 BRPM | WEIGHT: 183.8 LBS | HEART RATE: 95 BPM

## 2024-07-10 DIAGNOSIS — E78.5 HYPERLIPIDEMIA, UNSPECIFIED HYPERLIPIDEMIA TYPE: ICD-10-CM

## 2024-07-10 DIAGNOSIS — R10.9 ABDOMINAL PAIN, UNSPECIFIED ABDOMINAL LOCATION: ICD-10-CM

## 2024-07-10 DIAGNOSIS — R19.5 LOOSE STOOLS: ICD-10-CM

## 2024-07-10 DIAGNOSIS — E66.9 OBESITY (BMI 30-39.9): ICD-10-CM

## 2024-07-10 DIAGNOSIS — F41.9 ANXIETY AND DEPRESSION: ICD-10-CM

## 2024-07-10 DIAGNOSIS — F32.A ANXIETY AND DEPRESSION: ICD-10-CM

## 2024-07-10 DIAGNOSIS — R19.5 LOOSE STOOLS: Primary | ICD-10-CM

## 2024-07-10 DIAGNOSIS — E53.8 B12 DEFICIENCY: ICD-10-CM

## 2024-07-10 DIAGNOSIS — J45.41 MODERATE PERSISTENT ASTHMA WITH ACUTE EXACERBATION: ICD-10-CM

## 2024-07-10 LAB
ALBUMIN SERPL BCG-MCNC: 3.9 G/DL (ref 3.5–5)
ALP SERPL-CCNC: 84 U/L (ref 34–104)
ALT SERPL W P-5'-P-CCNC: 24 U/L (ref 7–52)
ANION GAP SERPL CALCULATED.3IONS-SCNC: 11 MMOL/L (ref 4–13)
AST SERPL W P-5'-P-CCNC: 18 U/L (ref 13–39)
BACTERIA UR QL AUTO: ABNORMAL /HPF
BASOPHILS # BLD AUTO: 0.02 THOUSANDS/ÂΜL (ref 0–0.1)
BASOPHILS NFR BLD AUTO: 0 % (ref 0–1)
BILIRUB SERPL-MCNC: 0.3 MG/DL (ref 0.2–1)
BILIRUB UR QL STRIP: NEGATIVE
BUN SERPL-MCNC: 11 MG/DL (ref 5–25)
CALCIUM SERPL-MCNC: 9.2 MG/DL (ref 8.4–10.2)
CHLORIDE SERPL-SCNC: 104 MMOL/L (ref 96–108)
CHOLEST SERPL-MCNC: 206 MG/DL
CLARITY UR: CLEAR
CO2 SERPL-SCNC: 26 MMOL/L (ref 21–32)
COLOR UR: ABNORMAL
CREAT SERPL-MCNC: 0.82 MG/DL (ref 0.6–1.3)
EOSINOPHIL # BLD AUTO: 0.34 THOUSAND/ÂΜL (ref 0–0.61)
EOSINOPHIL NFR BLD AUTO: 4 % (ref 0–6)
ERYTHROCYTE [DISTWIDTH] IN BLOOD BY AUTOMATED COUNT: 13 % (ref 11.6–15.1)
EST. AVERAGE GLUCOSE BLD GHB EST-MCNC: 120 MG/DL
GFR SERPL CREATININE-BSD FRML MDRD: 84 ML/MIN/1.73SQ M
GLUCOSE P FAST SERPL-MCNC: 108 MG/DL (ref 65–99)
GLUCOSE UR STRIP-MCNC: NEGATIVE MG/DL
HBA1C MFR BLD: 5.8 %
HCT VFR BLD AUTO: 43.4 % (ref 34.8–46.1)
HDLC SERPL-MCNC: 49 MG/DL
HGB BLD-MCNC: 14 G/DL (ref 11.5–15.4)
HGB UR QL STRIP.AUTO: ABNORMAL
IMM GRANULOCYTES # BLD AUTO: 0.03 THOUSAND/UL (ref 0–0.2)
IMM GRANULOCYTES NFR BLD AUTO: 0 % (ref 0–2)
KETONES UR STRIP-MCNC: NEGATIVE MG/DL
LDLC SERPL CALC-MCNC: 130 MG/DL (ref 0–100)
LEUKOCYTE ESTERASE UR QL STRIP: ABNORMAL
LYMPHOCYTES # BLD AUTO: 2.45 THOUSANDS/ÂΜL (ref 0.6–4.47)
LYMPHOCYTES NFR BLD AUTO: 32 % (ref 14–44)
MCH RBC QN AUTO: 31.8 PG (ref 26.8–34.3)
MCHC RBC AUTO-ENTMCNC: 32.3 G/DL (ref 31.4–37.4)
MCV RBC AUTO: 99 FL (ref 82–98)
MONOCYTES # BLD AUTO: 0.54 THOUSAND/ÂΜL (ref 0.17–1.22)
MONOCYTES NFR BLD AUTO: 7 % (ref 4–12)
NEUTROPHILS # BLD AUTO: 4.31 THOUSANDS/ÂΜL (ref 1.85–7.62)
NEUTS SEG NFR BLD AUTO: 57 % (ref 43–75)
NITRITE UR QL STRIP: POSITIVE
NON-SQ EPI CELLS URNS QL MICRO: ABNORMAL /HPF
NONHDLC SERPL-MCNC: 157 MG/DL
NRBC BLD AUTO-RTO: 0 /100 WBCS
PH UR STRIP.AUTO: 6.5 [PH]
PLATELET # BLD AUTO: 367 THOUSANDS/UL (ref 149–390)
PMV BLD AUTO: 10.1 FL (ref 8.9–12.7)
POTASSIUM SERPL-SCNC: 4.4 MMOL/L (ref 3.5–5.3)
PROT SERPL-MCNC: 7.5 G/DL (ref 6.4–8.4)
PROT UR STRIP-MCNC: NEGATIVE MG/DL
RBC # BLD AUTO: 4.4 MILLION/UL (ref 3.81–5.12)
RBC #/AREA URNS AUTO: ABNORMAL /HPF
SODIUM SERPL-SCNC: 141 MMOL/L (ref 135–147)
SP GR UR STRIP.AUTO: 1.01 (ref 1–1.03)
TRIGL SERPL-MCNC: 137 MG/DL
TSH SERPL DL<=0.05 MIU/L-ACNC: 2.32 UIU/ML (ref 0.45–4.5)
UROBILINOGEN UR STRIP-ACNC: <2 MG/DL
WBC # BLD AUTO: 7.69 THOUSAND/UL (ref 4.31–10.16)
WBC #/AREA URNS AUTO: ABNORMAL /HPF

## 2024-07-10 PROCEDURE — 85025 COMPLETE CBC W/AUTO DIFF WBC: CPT

## 2024-07-10 PROCEDURE — 36415 COLL VENOUS BLD VENIPUNCTURE: CPT

## 2024-07-10 PROCEDURE — 84443 ASSAY THYROID STIM HORMONE: CPT

## 2024-07-10 PROCEDURE — 81001 URINALYSIS AUTO W/SCOPE: CPT

## 2024-07-10 PROCEDURE — 80053 COMPREHEN METABOLIC PANEL: CPT

## 2024-07-10 PROCEDURE — 99214 OFFICE O/P EST MOD 30 MIN: CPT | Performed by: NURSE PRACTITIONER

## 2024-07-10 PROCEDURE — 83036 HEMOGLOBIN GLYCOSYLATED A1C: CPT

## 2024-07-10 PROCEDURE — 80061 LIPID PANEL: CPT

## 2024-07-10 RX ORDER — ESCITALOPRAM OXALATE 10 MG/1
10 TABLET ORAL DAILY
Qty: 30 TABLET | Refills: 0 | Status: SHIPPED | OUTPATIENT
Start: 2024-07-10

## 2024-07-10 RX ORDER — ATORVASTATIN CALCIUM 20 MG/1
20 TABLET, FILM COATED ORAL DAILY
Qty: 90 TABLET | Refills: 1 | Status: SHIPPED | OUTPATIENT
Start: 2024-07-10

## 2024-07-10 RX ORDER — HYDROXYZINE HYDROCHLORIDE 25 MG/1
25 TABLET, FILM COATED ORAL 3 TIMES DAILY PRN
Qty: 40 TABLET | Refills: 2 | Status: SHIPPED | OUTPATIENT
Start: 2024-07-10

## 2024-07-10 RX ORDER — ALBUTEROL SULFATE 90 UG/1
2 AEROSOL, METERED RESPIRATORY (INHALATION) EVERY 6 HOURS PRN
Qty: 18 G | Refills: 2 | Status: SHIPPED | OUTPATIENT
Start: 2024-07-10

## 2024-07-10 RX ORDER — FLUTICASONE PROPIONATE AND SALMETEROL 100; 50 UG/1; UG/1
1 POWDER RESPIRATORY (INHALATION) 2 TIMES DAILY
Qty: 100 BLISTER | Refills: 2 | Status: SHIPPED | OUTPATIENT
Start: 2024-07-10 | End: 2025-01-06

## 2024-07-10 NOTE — PROGRESS NOTES
Ambulatory Visit  Name: Shana Ceja      : 1975      MRN: [id number]  Encounter Provider: EVAN Park  Encounter Date: 7/10/2024   Encounter department: [facility]    Assessment & Plan   1. Loose stools  -     CBC and differential; Future  -     Comprehensive metabolic panel; Future  -     Stool Enteric Bacterial Panel by PCR; Future  -     FECAL LEUKOCYTES; Future  2. Moderate persistent asthma with acute exacerbation  Assessment & Plan:  Stable at this time   Continue with current regimen: Advair bid, albuterol PRN   Orders:  -     albuterol (Ventolin HFA) 90 mcg/act inhaler; Inhale 2 puffs every 6 (six) hours as needed for wheezing  -     Fluticasone-Salmeterol (Advair Diskus) 100-50 mcg/dose inhaler; Inhale 1 puff 2 (two) times a day Rinse mouth after use.  3. Obesity (BMI 30-39.9)  -     Hemoglobin A1C; Future  -     Lipid panel; Future  -     TSH, 3rd generation with Free T4 reflex; Future  4. Abdominal pain, unspecified abdominal location  -     Stool Enteric Bacterial Panel by PCR; Future  -     FECAL LEUKOCYTES; Future  -     Urinalysis with microscopic; Future  5. Anxiety and depression  Assessment & Plan:  She reports improvement in sx since starting escitalopram and hydroxyzine, would like to continue these, refills provided   Encouraged to establish with mental health provider for therapy   Orders:  -     hydrOXYzine HCL (ATARAX) 25 mg tablet; Take 1 tablet (25 mg total) by mouth 3 (three) times a day as needed for anxiety  -     escitalopram (LEXAPRO) 10 mg tablet; Take 1 tablet (10 mg total) by mouth daily  6. Hyperlipidemia, unspecified hyperlipidemia type  -     atorvastatin (LIPITOR) 20 mg tablet; Take 1 tablet (20 mg total) by mouth daily  7. B12 deficiency  -     cyanocobalamin (VITAMIN B-12) 1000 MCG tablet; Take 1 tablet (1,000 mcg total) by mouth daily      No evidence of dehydration on exam, recommend patient complete labs/ stool  testing. Will treat symptomatically. ED parameters reviewed.          History of Present Illness     Shana Ceja is a 49 y.o. female who  has a past medical history of Asthma and Gallstone who presented to the office today for follow up. Patient was started on escitalopram and hydroxyzine at last visit for the treatment of anxiety. She reports today that sx are improved with medication. She does note that over the past 2 weeks she has had generalized abdominal pain, loose stool, and vomiting after eating intermittently. No blood in the stool or urine and no fevers.     The following portions of the patient's history were reviewed and updated as appropriate: allergies, current medications, past family history, past medical history, past social history, past surgical history and problem list.      Review of Systems   Constitutional:  Negative for chills and fever.   HENT:  Negative for ear pain and sore throat.    Eyes:  Negative for pain and visual disturbance.   Respiratory:  Negative for cough and shortness of breath.    Cardiovascular:  Negative for chest pain and palpitations.   Gastrointestinal:  Positive for abdominal pain, diarrhea, nausea and vomiting.   Genitourinary:  Negative for dysuria and hematuria.   Musculoskeletal:  Negative for arthralgias and back pain.   Skin:  Negative for color change and rash.   Neurological:  Negative for seizures and syncope.   All other systems reviewed and are negative.    Past Medical History:   Diagnosis Date    Asthma     Gallstone     Migraine      Past Surgical History:   Procedure Laterality Date    CHOLECYSTECTOMY  2016     Family History   Problem Relation Age of Onset    Asthma Mother     No Known Problems Sister     No Known Problems Maternal Grandmother     No Known Problems Maternal Grandfather     No Known Problems Paternal Grandmother     No Known Problems Paternal Grandfather     No Known Problems Maternal Aunt     No Known Problems Paternal Aunt     Breast  cancer Paternal Aunt     No Known Problems Paternal Aunt     No Known Problems Paternal Aunt      Social History     Tobacco Use    Smoking status: Every Day     Current packs/day: 0.50     Types: Cigarettes     Passive exposure: Current    Smokeless tobacco: Former   Vaping Use    Vaping status: Never Used   Substance and Sexual Activity    Alcohol use: Not Currently    Drug use: Not Currently     Types: Marijuana    Sexual activity: Not Currently     Current Outpatient Medications on File Prior to Visit   Medication Sig    Diclofenac Sodium (VOLTAREN) 1 % Apply 2 g topically 3 (three) times a day    ipratropium-albuterol (DUO-NEB) 0.5-2.5 mg/3 mL nebulizer solution Take 3 mL by nebulization 4 (four) times a day    meclizine (ANTIVERT) 25 mg tablet Take 1 tablet (25 mg total) by mouth 3 (three) times a day as needed for dizziness    naproxen (NAPROSYN) 500 mg tablet TAKE 1 TABLET BY MOUTH TWICE A DAY WITH MEALS    potassium chloride (Klor-Con M20) 20 mEq tablet Take 1 tablet (20 mEq total) by mouth daily    pregabalin (LYRICA) 75 mg capsule Take 1 capsule (75 mg total) by mouth 3 (three) times a day    [DISCONTINUED] albuterol (Ventolin HFA) 90 mcg/act inhaler Inhale 2 puffs every 6 (six) hours as needed for wheezing    [DISCONTINUED] atorvastatin (LIPITOR) 20 mg tablet Take 1 tablet (20 mg total) by mouth daily    [DISCONTINUED] cyanocobalamin (VITAMIN B-12) 1000 MCG tablet Take 1 tablet (1,000 mcg total) by mouth daily    [DISCONTINUED] escitalopram (LEXAPRO) 10 mg tablet TAKE 1 TABLET BY MOUTH EVERY DAY    [DISCONTINUED] Fluticasone-Salmeterol (Advair Diskus) 100-50 mcg/dose inhaler Inhale 1 puff 2 (two) times a day Rinse mouth after use.    [DISCONTINUED] hydrOXYzine HCL (ATARAX) 25 mg tablet Take 1 tablet (25 mg total) by mouth 3 (three) times a day as needed for anxiety     No Known Allergies  Immunization History   Administered Date(s) Administered    COVID-19 Pfizer vac (Spencer-sucrose, gray cap) 12 yr+ IM  "01/17/2022    Tdap 08/18/2023     Objective     /78 (BP Location: Left arm, Patient Position: Sitting, Cuff Size: Standard)   Pulse 95   Temp 98.3 °F (36.8 °C) (Temporal)   Resp 18   Ht 5' 1\" (1.549 m)   Wt 83.4 kg (183 lb 12.8 oz)   LMP  (LMP Unknown) Comment: Going through menopause and got period in june  SpO2 98%   BMI 34.73 kg/m²     Physical Exam  Vitals and nursing note reviewed.   Constitutional:       General: She is not in acute distress.     Appearance: She is well-developed.   HENT:      Head: Normocephalic and atraumatic.   Eyes:      Conjunctiva/sclera: Conjunctivae normal.   Cardiovascular:      Rate and Rhythm: Normal rate and regular rhythm.   Pulmonary:      Effort: Pulmonary effort is normal. No respiratory distress.      Breath sounds: Normal breath sounds.   Abdominal:      Palpations: Abdomen is soft.      Tenderness: There is generalized abdominal tenderness.   Musculoskeletal:      Cervical back: Neck supple.   Skin:     General: Skin is warm and dry.      Capillary Refill: Capillary refill takes less than 2 seconds.   Neurological:      Mental Status: She is alert.   Psychiatric:         Mood and Affect: Mood normal.         "

## 2024-07-10 NOTE — ASSESSMENT & PLAN NOTE
She reports improvement in sx since starting escitalopram and hydroxyzine, would like to continue these, refills provided   Encouraged to establish with mental health provider for therapy

## 2024-07-11 DIAGNOSIS — N30.90 CYSTITIS: Primary | ICD-10-CM

## 2024-07-11 RX ORDER — NITROFURANTOIN 25; 75 MG/1; MG/1
100 CAPSULE ORAL 2 TIMES DAILY
Qty: 10 CAPSULE | Refills: 0 | Status: SHIPPED | OUTPATIENT
Start: 2024-07-11 | End: 2024-07-16

## 2024-07-12 ENCOUNTER — TELEPHONE (OUTPATIENT)
Age: 49
End: 2024-07-12

## 2024-07-12 ENCOUNTER — APPOINTMENT (OUTPATIENT)
Dept: LAB | Facility: CLINIC | Age: 49
End: 2024-07-12
Payer: COMMERCIAL

## 2024-07-12 DIAGNOSIS — R19.5 LOOSE STOOLS: ICD-10-CM

## 2024-07-12 DIAGNOSIS — R10.9 ABDOMINAL PAIN, UNSPECIFIED ABDOMINAL LOCATION: ICD-10-CM

## 2024-07-12 PROCEDURE — 89055 LEUKOCYTE ASSESSMENT FECAL: CPT

## 2024-07-12 PROCEDURE — 87505 NFCT AGENT DETECTION GI: CPT

## 2024-07-12 NOTE — TELEPHONE ENCOUNTER
Caller: teri ramirez Charlie mello    Doctor: SUN    Reason for call: would like work status for patient and ov note      Claim#[id number]  FAX # [phone]  Call back#:   [phone]

## 2024-07-13 LAB
C COLI+JEJUNI TUF STL QL NAA+PROBE: NEGATIVE
EC STX1+STX2 GENES STL QL NAA+PROBE: NEGATIVE
SALMONELLA SP SPAO STL QL NAA+PROBE: NEGATIVE
SHIGELLA SP+EIEC IPAH STL QL NAA+PROBE: NEGATIVE
WBC SPEC QL GRAM STN: NORMAL

## 2024-08-07 ENCOUNTER — OFFICE VISIT (OUTPATIENT)
Dept: FAMILY MEDICINE CLINIC | Facility: CLINIC | Age: 49
End: 2024-08-07

## 2024-08-07 VITALS
OXYGEN SATURATION: 98 % | SYSTOLIC BLOOD PRESSURE: 128 MMHG | BODY MASS INDEX: 35.06 KG/M2 | HEART RATE: 81 BPM | TEMPERATURE: 98.1 F | DIASTOLIC BLOOD PRESSURE: 82 MMHG | RESPIRATION RATE: 18 BRPM | HEIGHT: 61 IN | WEIGHT: 185.7 LBS

## 2024-08-07 DIAGNOSIS — F32.A ANXIETY AND DEPRESSION: ICD-10-CM

## 2024-08-07 DIAGNOSIS — S97.82XD CRUSHING INJURY OF LEFT FOOT, SUBSEQUENT ENCOUNTER: ICD-10-CM

## 2024-08-07 DIAGNOSIS — E66.9 OBESITY (BMI 30-39.9): Primary | ICD-10-CM

## 2024-08-07 DIAGNOSIS — F41.9 ANXIETY AND DEPRESSION: ICD-10-CM

## 2024-08-07 DIAGNOSIS — J45.41 MODERATE PERSISTENT ASTHMA WITH ACUTE EXACERBATION: ICD-10-CM

## 2024-08-07 PROCEDURE — 99214 OFFICE O/P EST MOD 30 MIN: CPT | Performed by: NURSE PRACTITIONER

## 2024-08-07 RX ORDER — BUPROPION HYDROCHLORIDE 150 MG/1
150 TABLET, EXTENDED RELEASE ORAL 2 TIMES DAILY
Qty: 60 TABLET | Refills: 2 | Status: SHIPPED | OUTPATIENT
Start: 2024-08-07

## 2024-08-07 RX ORDER — CYCLOBENZAPRINE HCL 10 MG
10 TABLET ORAL 3 TIMES DAILY PRN
Qty: 30 TABLET | Refills: 0 | Status: SHIPPED | OUTPATIENT
Start: 2024-08-07

## 2024-08-07 NOTE — PATIENT INSTRUCTIONS
"Patient Education     Carb counting for adults with diabetes   The Basics   Written by the doctors and editors at [facility]   What is carb counting? -- This is a type of meal planning that many people with diabetes use. It is a way to figure out how many carbohydrates, or \"carbs,\" you eat.  The body breaks down the food we eat into 3 main types of nutrients: carbs, proteins, and fats. Carbs are sugars and starches that come from food. The body uses carbs for energy.  Why do I need to count carbs? -- People with diabetes need to pay attention to how many carbs they eat. This is because carbs raise your blood sugar level.  Carb counting helps you:   Choose the right amount of insulin to take before meals and snacks - If you take insulin before meals, the dose depends on several things, including how many carbs you plan to eat. (It also depends on how much you plan to exercise and your blood sugar level.)   Plan your meals and snacks for the day - You can use carb counting to figure out how many carbs to eat at each meal and snack. This helps you make sure that you eat the right amount over the entire day.   Keep your blood sugar levels well managed - Spreading out the carbs you eat over a whole day can help keep your blood sugar from getting too high. If you take insulin or another diabetes medicine that can cause low blood sugar, eating about the same amount of carbs at each meal every day also helps keep your blood sugar from getting too low. Reducing the amount of carbs you eat can help you manage your diabetes better and prevent medical problems that diabetes can cause.  Your doctor, nurse, or dietitian (food expert) can help you figure out how many carbs to try to eat each day. This will depend on your eating habits, weight, activity level, and which diabetes medicines you take.  People who take insulin before meals might need to be very careful when they count the carbs in every meal and snack. This is so they " "can give themselves the right amount of insulin. If the insulin dose doesn't match the amount of carbs, their blood sugar might get too low or too high. Other people might be able to be a little more flexible as long as they get about the same amount of carbs at each meal or throughout the day.  Which foods have carbs? -- Foods with a lot of carbs include:   Grains - These include bread, pasta, rice, and cereal.   Fruits and starchy vegetables - Starchy vegetables include potatoes, corn, and squash.   Milk and other dairy products - Dairy products include cheese and yogurt.   Foods with added sugar - These include sweets and baked goods likes cookies and cakes, as well as sugary drinks like juice and soda.  It is best to get most of your carbs from fruits, vegetables, whole grains (like whole-wheat bread, whole-grain cereals, and brown rice), and low-fat milk and dairy products.  How do I count carbs? -- To count carbs in packaged foods, check the food's nutrition label (if it has one).  On the label (figure 1), check for:   \"Total Carbohydrate\" number - This tells you how many carbs are in 1 serving size of the food. If you eat 1 serving, then the number of carbs you eat is the same as the number of total carbohydrates.   \"Serving size\" - This tells you how much food is in 1 serving. If you have 2 servings, the number of carbs will be 2 times the number of carbohydrates listed.   \"Dietary Fiber\" - Fiber is a carb that is not digested, which means that it does not raise blood sugar. Foods with a lot of fiber can help manage your blood sugar. If a food has more than 5 grams (g) of fiber, you need less insulin to cover the total carbs in that food. So, if you are calculating an insulin dose, only count the carbs that are not from fiber (figure 1).  What is exchange planning? -- Exchange planning, or the \"exchange system,\" is a way for people to plan their meals without reading labels. This can be helpful since many " "foods don't come with a nutrition label.  The exchange system involves knowing how much of different foods have about 15 grams of carbs (table 1 and table 2 and table 3). Your doctor, nurse, or dietitian gives you a certain number of \"carb choices\" to eat with each meal and snack (table 4). Each \"choice\" is a portion of food that has about 15 grams of carbs. Knowing your options makes it easier to \"exchange\" 1 carb choice for another as you plan your meals and snacks. For example, 1 small apple could be exchanged for 1/3 cup of pasta.  How can I plan my meals? -- First, make sure that you know how many carbs you should be eating each day. Ask your doctor, nurse, or dietitian if you are not sure.  Here are some tips that might help:   Spread out your carbs over 4 to 6 small meals each day instead of 3 big ones.   Eat a similar number of carbs at each meal, for example, at each dinner.   Eat your meals at a similar time each day.   Plan your meals ahead of time.   Use the \"plate method.\" This is a simpler way to make sure that you get a good balance of carbs and other nutrients with each meal. It is not as exact as counting all of your carbs, but it can be helpful for people who prefer a simpler approach. If you take insulin before meals, it is generally better to adjust your insulin dose by counting how many carbs you plan to eat or using the exchange planning strategy.  For the plate method, you start with a plate about 9 inches (23 cm) across. Fill it with (figure 2):   1/2 non-starchy vegetables   1/4 protein   1/4 carbs   Follow your doctor's instructions for how and when to check your blood sugar. This can help you learn how certain foods affect your blood sugar.   Keep track of your meals and blood sugar levels. Show this to your doctor or nurse so they can adjust your treatment if needed. If you take insulin, you will also need to keep track of your exercise patterns and how much insulin you give yourself with " "each dose.   If you take insulin, make sure that you understand how to use it. This includes knowing how to adjust the dose based on your blood sugar level and what you plan to eat. Foods that have a lot of protein or fat also can affect your blood sugar level. Some people need to adjust their insulin doses when they eat these foods.   Remember that other things besides carbs can raise or lower your blood sugar level. These things can include exercise, getting sick, drinking alcohol, traveling, and stress. If you take insulin, make sure that you know how and when to adjust your dose in these situations.  If you are having trouble counting carbs or managing your blood sugar, talk to your doctor or nurse. They can help. A dietitian can also help you plan specific menus that will give you the right amount of carbs each day.  For more information, you can also get a book on counting carbs or check the American Diabetes Association website (www.diabetes.org).  All topics are updated as new evidence becomes available and our peer review process is complete.  This topic retrieved from [employer] on: Mar 27, 2024.  Topic 75335 Version 11.0  Release: 32.2.4 - C32.85  © 2024 UpToDate, Inc. and/or its affiliates. All rights reserved.  figure 1: Counting carbohydrates     To figure out the \"carb count\" in 1 serving, start with the number of grams of total carbohydrates (46 grams), then subtract the number of grams of dietary fiber (7 grams). It's also important to look at the serving size. In this example, the carb count is 39 grams. You can use this number when counting carbs for your insulin dose.  Graphic 39327 Version 8.0  table 1: Bread and grains with 15 grams of carbs*  Bread    Food  Serving size    Bagel 1/4 large bagel (1 oz)   Biscuit 1 biscuit (2.5 inches across)   Bread, reduced calorie, light 2 slices (1.5 oz)   Cornbread 1.75 inch cube (1.5 oz)   English muffin 1/2 muffin   Hot dog or hamburger bun 1/2 bun (3/4 oz) " "  Naan, chapati, or roti 1 oz   Pancake 1 pancake (4 inches across, 1/4 inch thick)   Karen (6 inches across) 1/2 karen   Tortilla, corn 1 small tortilla (6 inches across)   Tortilla, flour (white or whole wheat) 1 small tortilla (6 inches across) or 1/3 large tortilla (10 inches across)   Waffle 1 waffle (4-inch square or 4 inches across)   Cereals and grains (including pasta and rice)    Food  Serving size (cooked)    Barley, couscous, millet, pasta (white or whole wheat, all shapes and sizes), polenta, quinoa (all colors), or rice (white, brown, and other colors and types) 1/3 cup   Bran cereal (twigs, buds, or flakes), shredded wheat (plain), or sugar-coated cereal 1/2 cup   Bulgur, kasha, tabbouleh (tabouli), or wild rice 1/2 cup   Granola cereal 1/4 cup   Hot cereal (oats, oatmeal, grits) 1/2 cup   Unsweetened, ready-to-eat cereal 3/4 cup   * For bread and grains, 15 grams of carbs is considered 1 serving or \"choice\" for people who need to count carbs.  Graphic 636269 Version 1.0  table 2: Fruits with 15 grams of carbs*  Food  Serving size    Applesauce, unsweetened 1/2 cup   Banana 1 extra small banana, about 4 inches long (4 oz)   Blueberries 3/4 cup   Dried fruits (blueberries, cherries, cranberries, mixed fruit, raisins) 2 tbsp   Fruit, canned 1/2 cup   Fruit, whole, small (apple) 1 small fruit (4 oz)   Fruit, whole, medium (nectarine, orange, pear, tangerine) 1 medium fruit (6 oz)   Fruit juice, unsweetened 1/2 cup   Grapes 17 small grapes (3 oz)   Melon, diced 1 cup   Strawberries, whole 1 and 1/4 cups   When listed, weight (oz) includes skin and seeds. If you are not sure if your fruit is the right size for 1 serving, you can use a food scale to check the weight.  * For fruits, 15 grams of carbs is considered 1 serving or \"choice\" for people who need to count carbs.  Graphic 192933 Version 1.0  table 3: Starchy vegetables with 15 grams of carbs*  Food  Serving size (cooked)    Cassava, dasheen, or " "plantain 1/3 cup   Corn, green peas, mixed vegetables, or parsnips 1/2 cup   Marinara, pasta, or spaghetti sauce 1/2 cup   Mixed vegetables (with corn or peas) 1 cup   Potato, baked with skin 1/4 large (3 oz)   Potato, Armenian-fried (oven-baked) 1 cup (2 oz)   Potato, mashed with milk and fat 1/2 cup   Squash, winter (acorn, butternut) 1 cup   Yam or sweet potato, plain 1/2 cup (3 and 1/2 oz)   If you are not sure if your vegetable is the right size for 1 serving, you can use a food scale to check the weight.  * For starchy vegetables, 15 grams of carbs is considered 1 serving or \"choice\" for people who need to count carbs.  Graphic 035235 Version 1.0  table 4: Sample exchange system meal plan  Time  Exchange pattern  Sample menu  Carbohydrate count (g)    8 am 3 carbohydrate group    2 starch 1 English muffin 30    1 fruit 1 1/4 c strawberries 15    1 protein group 1/4 c cottage cheese -    1 fat group 1 tsp margarine -      Total: 45    12 noon 4 carbohydrate group    2 starch 2 slices of bread 30    1 fruit 1 orange 15    1 vegetable 1 c salad -    1 milk 8 oz skim milk 12    3 protein group 3 oz chicken -    1 fat group 1 tbsp low fat lamb -      Total: 57    3 pm 1 carbohydrate group    1 fruit or 1 starch 1 apple or 6 crackers 15      Total: 15    6 pm 4 carbohydrate group    2 starch 1 c potato 30    1 fruit 1/2 c fruit salad 15    1 vegetable 1 c salad -    1 milk 8 oz skim milk 12    6 protein group 6 oz fish -    1 fat group 2 tbsp low fat salad dressing -      Total: 57    9 pm 1 carbohydrate group    1 starch 6 crackers 15    1 protein 2 tbsp peanut butter -      Total: 15    Graphic 18341 Version 3.0  figure 2: The \"plate method\"     For the plate method, you start with a plate about 9 inches (23 cm) across. Then fill it with 1/2 non-starchy vegetables, 1/4 protein, and 1/4 carbs.  Graphic 351358 Version 2.0  Consumer Information Use and Disclaimer   Disclaimer: This generalized information is a limited " summary of diagnosis, treatment, and/or medication information. It is not meant to be comprehensive and should be used as a tool to help the user understand and/or assess potential diagnostic and treatment options. It does NOT include all information about conditions, treatments, medications, side effects, or risks that may apply to a specific patient. It is not intended to be medical advice or a substitute for the medical advice, diagnosis, or treatment of a health care provider based on the health care provider's examination and assessment of a patient's specific and unique circumstances. Patients must speak with a health care provider for complete information about their health, medical questions, and treatment options, including any risks or benefits regarding use of medications. This information does not endorse any treatments or medications as safe, effective, or approved for treating a specific patient. UpToDate, Inc. and its affiliates disclaim any warranty or liability relating to this information or the use thereof.The use of this information is governed by the Terms of Use, available at https://www.wolterskluwer.com/en/know/clinical-effectiveness-terms. 2024© UpToDate, Inc. and its affiliates and/or licensors. All rights reserved.  Copyright   © 2024 UpToDate, Inc. and/or its affiliates. All rights reserved.

## 2024-08-07 NOTE — PROGRESS NOTES
Ambulatory Visit  Name: Shana Ceja      : 1975      MRN: [id number]  Encounter Provider: EVAN Park  Encounter Date: 2024   [facility]    Assessment & Plan   1. Obesity (BMI 30-39.9)  Assessment & Plan:  Wt Readings from Last 3 Encounters:   24 84.2 kg (185 lb 11.2 oz)   07/10/24 83.4 kg (183 lb 12.8 oz)   24 83.9 kg (185 lb)     -Encouraged diet and lifestyle changes: decrease processed foods (cakes, cookies, chips, soda), decrease total carbohydrate intake, decrease fried/fatty foods, increase fruits and vegetables, increase lean proteins (chicken, turkey), increase healthy fats (avocado, fish, nuts), drink plenty of water (at least four 16 oz bottles per day)    Orders:  -     buPROPion (Wellbutrin SR) 150 mg 12 hr tablet; Take 1 tablet (150 mg total) by mouth 2 (two) times a day  2. Crushing injury of left foot, subsequent encounter  Assessment & Plan:  Workplace injury 2023   Following with podiatry  Chronic pain, affects QOL       Orders:  -     cyclobenzaprine (FLEXERIL) 10 mg tablet; Take 1 tablet (10 mg total) by mouth 3 (three) times a day as needed for muscle spasms  3. Moderate persistent asthma with acute exacerbation  Assessment & Plan:  Stable at this time   Continue with current regimen: Advair bid, albuterol PRN   4. Anxiety and depression  Assessment & Plan:  Continue  escitalopram and hydroxyzine, start bupropion daily x 1 week then increase to bid   Encouraged to establish with mental health provider for therapy          History of Present Illness     Shana Ceja is a 49 y.o. female who  has a past medical history of Asthma and Gallstone who presented to the office today for follow up. Patient was started on escitalopram and hydroxyzine at last visit for the treatment of anxiety. She reports today that she is having pain to the left foot that prevents her from being active. Since she is not moving  around much she has gained weight and feels very uncomfortable and unhappy. She also notes that her daughter recently moved in with her from [place].     The following portions of the patient's history were reviewed and updated as appropriate: allergies, current medications, past family history, past medical history, past social history, past surgical history and problem list.        Review of Systems   Constitutional:  Positive for unexpected weight change. Negative for activity change, appetite change, chills, fatigue and fever.   HENT:  Negative for hearing loss, nosebleeds, sinus pain, sneezing, sore throat and trouble swallowing.    Eyes:  Negative for photophobia and visual disturbance.   Respiratory:  Negative for cough, chest tightness, shortness of breath and wheezing.    Cardiovascular:  Negative for chest pain, palpitations and leg swelling.   Gastrointestinal:  Negative for abdominal pain, constipation, nausea and vomiting.   Genitourinary:  Negative for decreased urine volume, difficulty urinating, dysuria, flank pain, genital sores, hematuria and urgency.   Musculoskeletal:  Positive for arthralgias and gait problem. Negative for back pain.   Skin:  Negative for pallor, rash and wound.   Neurological:  Negative for dizziness, seizures, syncope, weakness, numbness and headaches.   Hematological:  Negative for adenopathy. Does not bruise/bleed easily.   Psychiatric/Behavioral:  Positive for dysphoric mood. Negative for confusion, hallucinations, self-injury, sleep disturbance and suicidal ideas. The patient is nervous/anxious.      Past Medical History:   Diagnosis Date    Asthma     Gallstone     Migraine      Past Surgical History:   Procedure Laterality Date    CHOLECYSTECTOMY  2016     Family History   Problem Relation Age of Onset    Asthma Mother     No Known Problems Sister     No Known Problems Maternal Grandmother     No Known Problems Maternal Grandfather     No Known Problems Paternal Grandmother      No Known Problems Paternal Grandfather     No Known Problems Maternal Aunt     No Known Problems Paternal Aunt     Breast cancer Paternal Aunt     No Known Problems Paternal Aunt     No Known Problems Paternal Aunt      Social History     Tobacco Use    Smoking status: Every Day     Current packs/day: 0.50     Types: Cigarettes     Passive exposure: Current    Smokeless tobacco: Former   Vaping Use    Vaping status: Never Used   Substance and Sexual Activity    Alcohol use: Not Currently    Drug use: Not Currently     Types: Marijuana    Sexual activity: Not Currently     Current Outpatient Medications on File Prior to Visit   Medication Sig    albuterol (Ventolin HFA) 90 mcg/act inhaler Inhale 2 puffs every 6 (six) hours as needed for wheezing    atorvastatin (LIPITOR) 20 mg tablet Take 1 tablet (20 mg total) by mouth daily    cyanocobalamin (VITAMIN B-12) 1000 MCG tablet Take 1 tablet (1,000 mcg total) by mouth daily    Diclofenac Sodium (VOLTAREN) 1 % Apply 2 g topically 3 (three) times a day    escitalopram (LEXAPRO) 10 mg tablet Take 1 tablet (10 mg total) by mouth daily    Fluticasone-Salmeterol (Advair Diskus) 100-50 mcg/dose inhaler Inhale 1 puff 2 (two) times a day Rinse mouth after use.    hydrOXYzine HCL (ATARAX) 25 mg tablet Take 1 tablet (25 mg total) by mouth 3 (three) times a day as needed for anxiety    ipratropium-albuterol (DUO-NEB) 0.5-2.5 mg/3 mL nebulizer solution Take 3 mL by nebulization 4 (four) times a day    meclizine (ANTIVERT) 25 mg tablet Take 1 tablet (25 mg total) by mouth 3 (three) times a day as needed for dizziness    naproxen (NAPROSYN) 500 mg tablet TAKE 1 TABLET BY MOUTH TWICE A DAY WITH MEALS    potassium chloride (Klor-Con M20) 20 mEq tablet Take 1 tablet (20 mEq total) by mouth daily    pregabalin (LYRICA) 75 mg capsule Take 1 capsule (75 mg total) by mouth 3 (three) times a day     No Known Allergies  Immunization History   Administered Date(s) Administered    COVID-19  "Pfizer vac (Spencer-sucrose, gray cap) 12 yr+ IM 01/17/2022    Tdap 08/18/2023     Objective     /82 (BP Location: Left arm, Patient Position: Sitting, Cuff Size: Standard)   Pulse 81   Temp 98.1 °F (36.7 °C) (Temporal)   Resp 18   Ht 5' 1\" (1.549 m)   Wt 84.2 kg (185 lb 11.2 oz)   LMP  (LMP Unknown) Comment: Going through menopause and got period in june  SpO2 98%   BMI 35.09 kg/m²     Physical Exam  Vitals and nursing note reviewed.   Constitutional:       General: She is not in acute distress.     Appearance: She is well-developed.   HENT:      Head: Normocephalic and atraumatic.   Eyes:      General:         Right eye: No discharge.         Left eye: No discharge.      Conjunctiva/sclera: Conjunctivae normal.   Cardiovascular:      Rate and Rhythm: Normal rate and regular rhythm.   Pulmonary:      Effort: Pulmonary effort is normal. No respiratory distress.      Breath sounds: Normal breath sounds. No wheezing or rales.   Musculoskeletal:      Cervical back: Normal range of motion and neck supple.   Skin:     General: Skin is warm and dry.      Capillary Refill: Capillary refill takes less than 2 seconds.   Neurological:      Mental Status: She is alert and oriented to person, place, and time.   Psychiatric:         Mood and Affect: Affect is tearful.         "

## 2024-08-07 NOTE — ASSESSMENT & PLAN NOTE
Wt Readings from Last 3 Encounters:   08/07/24 84.2 kg (185 lb 11.2 oz)   07/10/24 83.4 kg (183 lb 12.8 oz)   07/09/24 83.9 kg (185 lb)     -Encouraged diet and lifestyle changes: decrease processed foods (cakes, cookies, chips, soda), decrease total carbohydrate intake, decrease fried/fatty foods, increase fruits and vegetables, increase lean proteins (chicken, turkey), increase healthy fats (avocado, fish, nuts), drink plenty of water (at least four 16 oz bottles per day)

## 2024-08-07 NOTE — ASSESSMENT & PLAN NOTE
Continue  escitalopram and hydroxyzine, start bupropion daily x 1 week then increase to bid   Encouraged to establish with mental health provider for therapy

## 2024-08-09 ENCOUNTER — TELEPHONE (OUTPATIENT)
Age: 49
End: 2024-08-09

## 2024-08-12 ENCOUNTER — TELEPHONE (OUTPATIENT)
Age: 49
End: 2024-08-12

## 2024-08-12 ENCOUNTER — OFFICE VISIT (OUTPATIENT)
Dept: PODIATRY | Facility: CLINIC | Age: 49
End: 2024-08-12
Payer: OTHER MISCELLANEOUS

## 2024-08-12 VITALS
HEIGHT: 61 IN | BODY MASS INDEX: 34.89 KG/M2 | WEIGHT: 184.8 LBS | DIASTOLIC BLOOD PRESSURE: 82 MMHG | SYSTOLIC BLOOD PRESSURE: 126 MMHG

## 2024-08-12 DIAGNOSIS — G90.522 COMPLEX REGIONAL PAIN SYNDROME TYPE 1 OF LEFT LOWER EXTREMITY: Primary | ICD-10-CM

## 2024-08-12 DIAGNOSIS — S97.82XA CRUSHING INJURY OF LEFT FOOT, INITIAL ENCOUNTER: ICD-10-CM

## 2024-08-12 DIAGNOSIS — M24.875 OTHER SPECIFIC JOINT DERANGEMENTS LEFT FOOT, NOT ELSEWHERE CLASSIFIED: ICD-10-CM

## 2024-08-12 PROCEDURE — 99213 OFFICE O/P EST LOW 20 MIN: CPT | Performed by: PODIATRIST

## 2024-08-12 NOTE — TELEPHONE ENCOUNTER
Caller: patient     Doctor: Raul     Reason for call: mri scheduled for 8/16 @7:15 am     Call back#: [phone]

## 2024-08-12 NOTE — PROGRESS NOTES
Ambulatory Visit  Name: Shana Ceja      : 1975      MRN: [id number]  Encounter Provider: Odilon Carter DPM  Encounter Date: 2024   Encounter department: [facility] PODIATRY [place]    Assessment & Plan   1. Complex regional pain syndrome type 1 of left lower extremity  -     MRI foot/forefoot toes left wo contrast; Future; Expected date: 2024  2. Other specific joint derangements left foot, not elsewhere classified  -     MRI foot/forefoot toes left wo contrast; Future; Expected date: 2024  3. Crushing injury of left foot, initial encounter  -     MRI foot/forefoot toes left wo contrast; Future; Expected date: 2024  Patient has severe chronic pain not only in her foot but back and legs. She cannot tolerate PT or bracing. Injections have not provided any benefit. I don't have much I can offer her unfortunately for resolution.     Reviewed visit with Dr. Bardales, injection caused more pain  Reviewed visit with Dr. Guo, spinal cord stimulator has little effect  Reviewed multiple images XR and CT over the year.     I don't have a significant explanation for her pain. Sometimes with crush injuries there is nerve damage. She has trialed gabapentin and lyrica with no benefit     Clinical exam made more difficult as any palpation to the CEFERINO causes her to cry in pain. I advised her to seek as many opinions as possible.     Given her high level of pain, we can try to repeat the MRI to see if there is any further unknown pathology    History of Present Illness     Shana Ceja is a 49 y.o. female who presents with CRPS. She has seen multiple podiatrists (including myself), pain management, orthopedics and physical therapy. She is not getting any pain relief. She has had CT, XR without much explainable reason for her pain. She doesn't know what else to do.   She got a injection from Dr. Bardales and states it didn't do anything other than make the pain worse. Dr. Guo from pain management tried  "some medications without any benefit. PT makes the pain unbearable.     Review of Systems  As stated in HPI, otherwise normal    Medical History Reviewed by provider this encounter:  Tobacco  Allergies  Meds  Problems  Med Hx  Surg Hx  Fam Hx        Objective     /82   Ht 5' 1\" (1.549 m)   Wt 83.8 kg (184 lb 12.8 oz)   BMI 34.92 kg/m²     Physical Exam  Vitals reviewed.   Constitutional:       Appearance: She is obese. She is not ill-appearing.   Cardiovascular:      Rate and Rhythm: Normal rate.      Pulses: Normal pulses.   Pulmonary:      Effort: No respiratory distress.   Musculoskeletal:         General: Swelling and tenderness (severe global pain LLE, difficult to isolate pain as she is severe tender everywhere I lightly touch. Skin is pallor to foot with edema.) present.   Skin:     Findings: No erythema.   Neurological:      Mental Status: She is alert.       Administrative Statements           " No difficulties

## 2024-08-16 ENCOUNTER — TELEPHONE (OUTPATIENT)
Age: 49
End: 2024-08-16

## 2024-08-16 ENCOUNTER — HOSPITAL ENCOUNTER (OUTPATIENT)
Dept: RADIOLOGY | Facility: HOSPITAL | Age: 49
Discharge: HOME | End: 2024-08-16

## 2024-08-16 DIAGNOSIS — G90.522 COMPLEX REGIONAL PAIN SYNDROME TYPE 1 OF LEFT LOWER EXTREMITY: ICD-10-CM

## 2024-08-16 DIAGNOSIS — S97.82XA CRUSHING INJURY OF LEFT FOOT, INITIAL ENCOUNTER: ICD-10-CM

## 2024-08-16 DIAGNOSIS — M24.875 OTHER SPECIFIC JOINT DERANGEMENTS LEFT FOOT, NOT ELSEWHERE CLASSIFIED: ICD-10-CM

## 2024-08-16 DIAGNOSIS — R42 DIZZINESS: ICD-10-CM

## 2024-08-19 ENCOUNTER — HOSPITAL ENCOUNTER (OUTPATIENT)
Dept: MRI IMAGING | Facility: HOSPITAL | Age: 49
Discharge: HOME | End: 2024-08-19
Attending: PODIATRIST
Payer: COMMERCIAL

## 2024-08-19 PROCEDURE — 73718 MRI LOWER EXTREMITY W/O DYE: CPT

## 2024-08-20 NOTE — TELEPHONE ENCOUNTER
Called and spoke with facility and gave updated status 
Caller: [employer]    Doctor and/or Office: Dr. Carter/Alfonzo    #: [phone]     Escalation: Last office visit Requesting work status of patient Shana. Please return call and reference claim number [id number]. Thank you  
2-point gait

## 2024-09-09 DIAGNOSIS — F41.9 ANXIETY AND DEPRESSION: ICD-10-CM

## 2024-09-09 DIAGNOSIS — F32.A ANXIETY AND DEPRESSION: ICD-10-CM

## 2024-09-09 RX ORDER — ESCITALOPRAM OXALATE 10 MG/1
10 TABLET ORAL DAILY
Qty: 30 TABLET | Refills: 0 | Status: SHIPPED | OUTPATIENT
Start: 2024-09-09 | End: 2024-09-18

## 2024-09-18 ENCOUNTER — OFFICE VISIT (OUTPATIENT)
Dept: FAMILY MEDICINE CLINIC | Facility: CLINIC | Age: 49
End: 2024-09-18

## 2024-09-18 VITALS
RESPIRATION RATE: 16 BRPM | WEIGHT: 186 LBS | TEMPERATURE: 98 F | SYSTOLIC BLOOD PRESSURE: 128 MMHG | DIASTOLIC BLOOD PRESSURE: 80 MMHG | OXYGEN SATURATION: 100 % | BODY MASS INDEX: 35.12 KG/M2 | HEART RATE: 98 BPM | HEIGHT: 61 IN

## 2024-09-18 DIAGNOSIS — S97.82XD CRUSHING INJURY OF LEFT FOOT, SUBSEQUENT ENCOUNTER: ICD-10-CM

## 2024-09-18 DIAGNOSIS — R42 DIZZINESS: ICD-10-CM

## 2024-09-18 DIAGNOSIS — J45.31 MILD PERSISTENT ASTHMA WITH EXACERBATION: ICD-10-CM

## 2024-09-18 DIAGNOSIS — R30.0 DYSURIA: Primary | ICD-10-CM

## 2024-09-18 DIAGNOSIS — R42 VERTIGO: ICD-10-CM

## 2024-09-18 DIAGNOSIS — F41.9 ANXIETY AND DEPRESSION: ICD-10-CM

## 2024-09-18 DIAGNOSIS — E66.9 OBESITY (BMI 30-39.9): ICD-10-CM

## 2024-09-18 DIAGNOSIS — F32.A ANXIETY AND DEPRESSION: ICD-10-CM

## 2024-09-18 LAB
SL AMB  POCT GLUCOSE, UA: ABNORMAL
SL AMB LEUKOCYTE ESTERASE,UA: ABNORMAL
SL AMB POCT BILIRUBIN,UA: ABNORMAL
SL AMB POCT BLOOD,UA: ABNORMAL
SL AMB POCT CLARITY,UA: CLEAR
SL AMB POCT COLOR,UA: YELLOW
SL AMB POCT KETONES,UA: ABNORMAL
SL AMB POCT NITRITE,UA: ABNORMAL
SL AMB POCT PH,UA: 6
SL AMB POCT SPECIFIC GRAVITY,UA: 1.02
SL AMB POCT URINE PROTEIN: ABNORMAL
SL AMB POCT UROBILINOGEN: ABNORMAL

## 2024-09-18 PROCEDURE — 87077 CULTURE AEROBIC IDENTIFY: CPT | Performed by: NURSE PRACTITIONER

## 2024-09-18 PROCEDURE — 81002 URINALYSIS NONAUTO W/O SCOPE: CPT | Performed by: NURSE PRACTITIONER

## 2024-09-18 PROCEDURE — 87086 URINE CULTURE/COLONY COUNT: CPT | Performed by: NURSE PRACTITIONER

## 2024-09-18 PROCEDURE — 87186 SC STD MICRODIL/AGAR DIL: CPT | Performed by: NURSE PRACTITIONER

## 2024-09-18 PROCEDURE — 99214 OFFICE O/P EST MOD 30 MIN: CPT | Performed by: NURSE PRACTITIONER

## 2024-09-18 PROCEDURE — 96372 THER/PROPH/DIAG INJ SC/IM: CPT | Performed by: NURSE PRACTITIONER

## 2024-09-18 RX ORDER — NITROFURANTOIN 25; 75 MG/1; MG/1
100 CAPSULE ORAL 2 TIMES DAILY
Qty: 10 CAPSULE | Refills: 0 | Status: SHIPPED | OUTPATIENT
Start: 2024-09-18 | End: 2024-09-23

## 2024-09-18 RX ORDER — ESCITALOPRAM OXALATE 20 MG/1
20 TABLET ORAL DAILY
Qty: 90 TABLET | Refills: 0 | Status: SHIPPED | OUTPATIENT
Start: 2024-09-18

## 2024-09-18 RX ORDER — KETOROLAC TROMETHAMINE 30 MG/ML
30 INJECTION, SOLUTION INTRAMUSCULAR; INTRAVENOUS ONCE
Status: COMPLETED | OUTPATIENT
Start: 2024-09-18 | End: 2024-09-18

## 2024-09-18 RX ORDER — MECLIZINE HYDROCHLORIDE 25 MG/1
25 TABLET ORAL 3 TIMES DAILY PRN
Qty: 30 TABLET | Refills: 0 | Status: SHIPPED | OUTPATIENT
Start: 2024-09-18

## 2024-09-18 RX ORDER — BUPROPION HYDROCHLORIDE 150 MG/1
150 TABLET, EXTENDED RELEASE ORAL 2 TIMES DAILY
Qty: 60 TABLET | Refills: 2 | Status: SHIPPED | OUTPATIENT
Start: 2024-09-18

## 2024-09-18 RX ORDER — HYDROXYZINE HYDROCHLORIDE 50 MG/1
50 TABLET, FILM COATED ORAL 3 TIMES DAILY PRN
Qty: 40 TABLET | Refills: 2 | Status: SHIPPED | OUTPATIENT
Start: 2024-09-18

## 2024-09-18 RX ORDER — BENZONATATE 200 MG/1
200 CAPSULE ORAL 3 TIMES DAILY PRN
Qty: 20 CAPSULE | Refills: 0 | Status: SHIPPED | OUTPATIENT
Start: 2024-09-18

## 2024-09-18 RX ORDER — PREDNISONE 20 MG/1
40 TABLET ORAL DAILY
Qty: 10 TABLET | Refills: 0 | Status: SHIPPED | OUTPATIENT
Start: 2024-09-18 | End: 2024-09-23

## 2024-09-18 RX ADMIN — KETOROLAC TROMETHAMINE 30 MG: 30 INJECTION, SOLUTION INTRAMUSCULAR; INTRAVENOUS at 09:47

## 2024-09-18 NOTE — PROGRESS NOTES
Ambulatory Visit  Name: Shana Ceja      : 1975      MRN: [id number]  Encounter Provider: EVAN Park  Encounter Date: 2024   Encounter department: [facility]ACT[facility] [place]    Assessment & Plan  Dysuria  Urine dip + leukocytes and nitrites   Will treat, send out for cx   Orders:    POCT urine dip    Urine culture    nitrofurantoin (MACROBID) 100 mg capsule; Take 1 capsule (100 mg total) by mouth 2 (two) times a day for 5 days    Anxiety and depression  Doing well with Lexapro, would like to increase to 20 mg daily     Orders:    escitalopram (LEXAPRO) 20 mg tablet; Take 1 tablet (20 mg total) by mouth daily    hydrOXYzine HCL (ATARAX) 50 mg tablet; Take 1 tablet (50 mg total) by mouth 3 (three) times a day as needed for anxiety    Obesity (BMI 30-39.9)    Orders:    buPROPion (Wellbutrin SR) 150 mg 12 hr tablet; Take 1 tablet (150 mg total) by mouth 2 (two) times a day    Dizziness    Orders:    meclizine (ANTIVERT) 25 mg tablet; Take 1 tablet (25 mg total) by mouth 3 (three) times a day as needed for dizziness    Mild persistent asthma with exacerbation    Orders:    predniSONE 20 mg tablet; Take 2 tablets (40 mg total) by mouth daily for 5 days    benzonatate (TESSALON) 200 MG capsule; Take 1 capsule (200 mg total) by mouth 3 (three) times a day as needed for cough    Crushing injury of left foot, subsequent encounter    Orders:    ketorolac (TORADOL) injection 30 mg    Vertigo    Orders:    Ambulatory Referral to Otolaryngology; Future    Ambulatory Referral to Physical Therapy; Future       History of Present Illness     Shana Ceja is a 49 y.o. female who  has a past medical history of Asthma and Gallstone who presented to the office today for follow up. Patient was started on escitalopram and hydroxyzine at last visit for the treatment of anxiety. She reports today that she is having pain to the left foot that prevents her from being active. Since she is not  "moving around much she has gained weight and feels very uncomfortable and unhappy. She also notes that her daughter recently moved in with her from [place].     The following portions of the patient's history were reviewed and updated as appropriate: allergies, current medications, past family history, past medical history, past social history, past surgical history and problem list.            Review of Systems   Constitutional:  Positive for unexpected weight change. Negative for activity change, appetite change, chills, fatigue and fever.   HENT:  Negative for hearing loss, nosebleeds, sinus pain, sneezing, sore throat and trouble swallowing.    Eyes:  Negative for photophobia and visual disturbance.   Respiratory:  Negative for cough, chest tightness, shortness of breath and wheezing.    Cardiovascular:  Negative for chest pain, palpitations and leg swelling.   Gastrointestinal:  Negative for abdominal pain, constipation, nausea and vomiting.   Genitourinary:  Negative for decreased urine volume, difficulty urinating, dysuria, flank pain, genital sores, hematuria and urgency.   Musculoskeletal:  Positive for arthralgias and gait problem. Negative for back pain.   Skin:  Negative for pallor, rash and wound.   Neurological:  Negative for dizziness, seizures, syncope, weakness, numbness and headaches.   Hematological:  Negative for adenopathy. Does not bruise/bleed easily.   Psychiatric/Behavioral:  Positive for dysphoric mood. Negative for confusion, hallucinations, self-injury, sleep disturbance and suicidal ideas. The patient is nervous/anxious.            Objective     /80 (BP Location: Right arm, Patient Position: Sitting, Cuff Size: Large)   Pulse 98   Temp 98 °F (36.7 °C) (Temporal)   Resp 16   Ht 5' 1\" (1.549 m)   Wt 84.4 kg (186 lb)   SpO2 100%   BMI 35.14 kg/m²     Physical Exam  Vitals and nursing note reviewed.   Constitutional:       General: She is not in acute distress.     Appearance: " Normal appearance. She is not diaphoretic.   HENT:      Head: Normocephalic.      Right Ear: External ear normal.      Left Ear: External ear normal.      Nose: Nose normal.      Mouth/Throat:      Mouth: Mucous membranes are moist.   Eyes:      General:         Right eye: No discharge.         Left eye: No discharge.      Extraocular Movements: Extraocular movements intact.      Conjunctiva/sclera: Conjunctivae normal.      Pupils: Pupils are equal, round, and reactive to light.   Cardiovascular:      Rate and Rhythm: Normal rate and regular rhythm.      Pulses: Normal pulses.      Heart sounds: Normal heart sounds.   Pulmonary:      Effort: Pulmonary effort is normal. No respiratory distress.      Breath sounds: No decreased breath sounds or wheezing.   Abdominal:      General: Bowel sounds are normal. There is no distension.      Palpations: Abdomen is soft.   Musculoskeletal:         General: Normal range of motion.      Cervical back: Normal range of motion and neck supple. No rigidity.      Right lower leg: No edema.      Left lower leg: No edema.   Lymphadenopathy:      Cervical: No cervical adenopathy.   Skin:     General: Skin is warm and dry.      Capillary Refill: Capillary refill takes less than 2 seconds.      Findings: No rash.   Neurological:      General: No focal deficit present.      Mental Status: She is alert and oriented to person, place, and time.   Psychiatric:         Mood and Affect: Mood normal.         Behavior: Behavior normal.

## 2024-09-18 NOTE — ASSESSMENT & PLAN NOTE
Orders:    meclizine (ANTIVERT) 25 mg tablet; Take 1 tablet (25 mg total) by mouth 3 (three) times a day as needed for dizziness

## 2024-09-18 NOTE — ASSESSMENT & PLAN NOTE
Doing well with Lexapro, would like to increase to 20 mg daily     Orders:    escitalopram (LEXAPRO) 20 mg tablet; Take 1 tablet (20 mg total) by mouth daily    hydrOXYzine HCL (ATARAX) 50 mg tablet; Take 1 tablet (50 mg total) by mouth 3 (three) times a day as needed for anxiety

## 2024-09-18 NOTE — ASSESSMENT & PLAN NOTE
Orders:    buPROPion (Wellbutrin SR) 150 mg 12 hr tablet; Take 1 tablet (150 mg total) by mouth 2 (two) times a day

## 2024-09-20 LAB — BACTERIA UR CULT: ABNORMAL

## 2024-09-23 DIAGNOSIS — N39.0 URINARY TRACT INFECTION WITHOUT HEMATURIA, SITE UNSPECIFIED: Primary | ICD-10-CM

## 2024-09-23 RX ORDER — SULFAMETHOXAZOLE/TRIMETHOPRIM 800-160 MG
1 TABLET ORAL EVERY 12 HOURS SCHEDULED
Qty: 6 TABLET | Refills: 0 | Status: SHIPPED | OUTPATIENT
Start: 2024-09-23 | End: 2024-09-26

## 2024-10-29 ENCOUNTER — HOSPITAL ENCOUNTER (OUTPATIENT)
Dept: RADIOLOGY | Facility: HOSPITAL | Age: 49
End: 2024-10-29
Payer: OTHER MISCELLANEOUS

## 2024-10-29 ENCOUNTER — HOSPITAL ENCOUNTER (OUTPATIENT)
Dept: RADIOLOGY | Facility: HOSPITAL | Age: 49
Discharge: HOME | End: 2024-10-29
Payer: OTHER MISCELLANEOUS

## 2024-10-29 DIAGNOSIS — G90.522 COMPLEX REGIONAL PAIN SYNDROME TYPE 1 OF LEFT LOWER EXTREMITY: ICD-10-CM

## 2024-10-30 ENCOUNTER — HOSPITAL ENCOUNTER (OUTPATIENT)
Dept: RADIOLOGY | Facility: HOSPITAL | Age: 49
Discharge: HOME | End: 2024-10-30
Payer: OTHER MISCELLANEOUS

## 2024-10-30 PROCEDURE — A9503 TC99M MEDRONATE: HCPCS

## 2024-10-30 PROCEDURE — 78315 BONE IMAGING 3 PHASE: CPT

## 2024-12-29 DIAGNOSIS — F41.9 ANXIETY AND DEPRESSION: ICD-10-CM

## 2024-12-29 DIAGNOSIS — F32.A ANXIETY AND DEPRESSION: ICD-10-CM

## 2024-12-31 RX ORDER — ESCITALOPRAM OXALATE 20 MG/1
20 TABLET ORAL DAILY
Qty: 90 TABLET | Refills: 0 | Status: SHIPPED | OUTPATIENT
Start: 2024-12-31